# Patient Record
Sex: MALE | Race: WHITE | NOT HISPANIC OR LATINO | Employment: OTHER | URBAN - METROPOLITAN AREA
[De-identification: names, ages, dates, MRNs, and addresses within clinical notes are randomized per-mention and may not be internally consistent; named-entity substitution may affect disease eponyms.]

---

## 2017-01-03 ENCOUNTER — APPOINTMENT (OUTPATIENT)
Dept: RADIOLOGY | Facility: MEDICAL CENTER | Age: 72
End: 2017-01-03
Attending: PSYCHIATRY & NEUROLOGY
Payer: MEDICARE

## 2017-01-03 DIAGNOSIS — I73.9 VASCULAR CLAUDICATION (HCC): ICD-10-CM

## 2017-01-03 PROCEDURE — 74175 CTA ABDOMEN W/CONTRAST: CPT

## 2017-01-03 PROCEDURE — 700117 HCHG RX CONTRAST REV CODE 255: Performed by: PSYCHIATRY & NEUROLOGY

## 2017-01-03 RX ADMIN — IOHEXOL 100 ML: 350 INJECTION, SOLUTION INTRAVENOUS at 09:38

## 2017-01-04 ENCOUNTER — TELEPHONE (OUTPATIENT)
Dept: NEUROLOGY | Facility: MEDICAL CENTER | Age: 72
End: 2017-01-04

## 2017-01-04 NOTE — TELEPHONE ENCOUNTER
Please tell the patient     No significant stenosis or occlusions identified in the arteries to the legs. YP    Called and LM for pt to return my call to discuss all given information. EDDA

## 2017-01-12 ENCOUNTER — TELEPHONE (OUTPATIENT)
Dept: INTERNAL MEDICINE | Facility: IMAGING CENTER | Age: 72
End: 2017-01-12

## 2017-01-12 DIAGNOSIS — Z95.5 STENTED CORONARY ARTERY: Chronic | ICD-10-CM

## 2017-01-12 DIAGNOSIS — Z78.9 STATIN INTOLERANCE: ICD-10-CM

## 2017-01-12 DIAGNOSIS — E78.2 MIXED HYPERLIPIDEMIA: ICD-10-CM

## 2017-01-12 DIAGNOSIS — I25.83 CORONARY ARTERIOSCLEROSIS DUE TO LIPID RICH PLAQUE: Chronic | ICD-10-CM

## 2017-01-12 DIAGNOSIS — I25.10 CORONARY ARTERIOSCLEROSIS DUE TO LIPID RICH PLAQUE: Chronic | ICD-10-CM

## 2017-01-12 NOTE — TELEPHONE ENCOUNTER
Reviewed results of his recent CT aortogram.  Most significant is his atherosclerosis. Previously was on statins, discontinued this because of an elevated CPK and some leg pains. He really does not want to go back on a statin.  Exam showed some celiac artery stenosis. He has no GI symptoms. We discussed what to watch for as far as abdominal pain, diarrhea, blood in his stools.    I  recommend he contact Dr. Olvera, cardiology to discuss his vascular disease and lipid management.

## 2017-01-31 ENCOUNTER — OFFICE VISIT (OUTPATIENT)
Dept: CARDIOLOGY | Facility: MEDICAL CENTER | Age: 72
End: 2017-01-31
Payer: MEDICARE

## 2017-01-31 VITALS
SYSTOLIC BLOOD PRESSURE: 144 MMHG | DIASTOLIC BLOOD PRESSURE: 78 MMHG | WEIGHT: 272 LBS | HEIGHT: 71 IN | HEART RATE: 84 BPM | BODY MASS INDEX: 38.08 KG/M2

## 2017-01-31 DIAGNOSIS — I25.83 CORONARY ARTERIOSCLEROSIS DUE TO LIPID RICH PLAQUE: Chronic | ICD-10-CM

## 2017-01-31 DIAGNOSIS — I10 ESSENTIAL HYPERTENSION: ICD-10-CM

## 2017-01-31 DIAGNOSIS — I25.10 CORONARY ARTERIOSCLEROSIS DUE TO LIPID RICH PLAQUE: Chronic | ICD-10-CM

## 2017-01-31 PROCEDURE — G8432 DEP SCR NOT DOC, RNG: HCPCS | Performed by: INTERNAL MEDICINE

## 2017-01-31 PROCEDURE — G8482 FLU IMMUNIZE ORDER/ADMIN: HCPCS | Performed by: INTERNAL MEDICINE

## 2017-01-31 PROCEDURE — 1101F PT FALLS ASSESS-DOCD LE1/YR: CPT | Mod: 8P | Performed by: INTERNAL MEDICINE

## 2017-01-31 PROCEDURE — G8419 CALC BMI OUT NRM PARAM NOF/U: HCPCS | Performed by: INTERNAL MEDICINE

## 2017-01-31 PROCEDURE — 3017F COLORECTAL CA SCREEN DOC REV: CPT | Performed by: INTERNAL MEDICINE

## 2017-01-31 PROCEDURE — 99214 OFFICE O/P EST MOD 30 MIN: CPT | Performed by: INTERNAL MEDICINE

## 2017-01-31 PROCEDURE — 4040F PNEUMOC VAC/ADMIN/RCVD: CPT | Performed by: INTERNAL MEDICINE

## 2017-01-31 PROCEDURE — 1036F TOBACCO NON-USER: CPT | Performed by: INTERNAL MEDICINE

## 2017-01-31 NOTE — MR AVS SNAPSHOT
"        Myke Steele   2017 1:45 PM   Office Visit   MRN: [id number]    Department:  [facility]   Dept Phone:  [phone]    Description:  Male : 1945   Provider:  Raj Olvera M.D.           Reason for Visit     Follow-Up           Allergies as of 2017     No Known Allergies      You were diagnosed with     Coronary arteriosclerosis due to lipid rich plaque   [025082]       Essential hypertension   [4076070]         Vital Signs     Blood Pressure Pulse Height Weight Body Mass Index Smoking Status    144/78 mmHg 84 1.803 m (5' 10.98\") 123.378 kg (272 lb) 37.95 kg/m2 Never Smoker       Basic Information     Date Of Birth Sex Race Ethnicity Preferred Language    1945 Male White Non- English      Problem List              ICD-10-CM Priority Class Noted - Resolved    Stented coronary artery (Chronic) Z95.5   2012 - Present    BPH (benign prostatic hypertrophy) with urinary obstruction (Chronic) N40.1, N13.8   2012 - Present    Hypercholesterolemia (Chronic) E78.00   2012 - Present    Coronary arteriosclerosis due to lipid rich plaque (Chronic) I25.10, I25.83   Unknown - Present    Obesity (Chronic) E66.9   2014 - Present    Leukocytosis (Chronic) D72.829   2014 - Present    DDD (degenerative disc disease), lumbar (Chronic) M51.36   2014 - Present    Spinal stenosis of lumbar region M48.06   2014 - Present    Lumbosacral spondylosis without myelopathy M47.817   2015 - Present    Constipation K59.00   2015 - Present    Thyroid activity decreased (Chronic) E03.9   2015 - Present    Foot drop, bilateral M21.371, M21.372   3/18/2016 - Present    Essential hypertension I10   3/19/2016 - Present    Chest pain R07.9   3/19/2016 - Present    Vascular claudication (CMS-HCC) I73.9   2016 - Present    Neuropathy (CMS-HCC) G62.9   2016 - Present      Health Maintenance        Date Due Completion Dates    IMM ZOSTER " VACCINE 11/7/2007 ---    IMM PNEUMOCOCCAL 65+ (ADULT) LOW/MEDIUM RISK SERIES (2 of 2 - PPSV23) 9/23/2016 9/23/2015    IMM DTaP/Tdap/Td Vaccine (2 - Td) 8/27/2024 8/27/2014    COLONOSCOPY 11/18/2024 11/18/2014            Current Immunizations     13-VALENT PCV PREVNAR 9/23/2015    Influenza Vaccine Adult HD 10/7/2014    Influenza Vaccine Quad Inj (Pf) 11/21/2016    Pneumococcal Vaccine (PCV7) Historical Data 10/1/2014    Tdap Vaccine 8/27/2014  4:30 PM      Below and/or attached are the medications your provider expects you to take. Review all of your home medications and newly ordered medications with your provider and/or pharmacist. Follow medication instructions as directed by your provider and/or pharmacist. Please keep your medication list with you and share with your provider. Update the information when medications are discontinued, doses are changed, or new medications (including over-the-counter products) are added; and carry medication information at all times in the event of emergency situations     Allergies:  No Known Allergies          Medications  Valid as of: January 31, 2017 -  2:55 PM    Generic Name Brand Name Tablet Size Instructions for use    Alpha-Lipoic Acid (Cap) Alpha-Lipoic Acid 600 MG Take 600 mg by mouth every day.        AmLODIPine Besylate (Tab) NORVASC 5 MG Take 1 Tab by mouth every day.        Aspirin (Chew Tab) ASA 81 MG Take 243 mg by mouth every day.        Carvedilol (Tab) COREG 12.5 MG Take 1 Tab by mouth 2 times a day, with meals.        Cephalexin (Cap) KEFLEX 500 MG Take 1 Cap by mouth 3 times a day.        Coenzyme Q10 (Cap) Co Q-10 100 MG Take 200 mg by mouth every day.        Cyanocobalamin (SL Tab) Vitamin B-12 5000 MCG Place  under tongue.        Docusate Sodium (Cap)  MG Take 100 mg by mouth 2 Times a Day.        LevOCARNitine (Powder) Carnitine (L)  3 g by Does not apply route every day.        Levothyroxine Sodium (Tab) SYNTHROID 75 MCG TAKE 1 TAB BY MOUTH EVERY  DAY.        Levothyroxine Sodium (Tab) SYNTHROID 75 MCG TAKE 1 TAB BY MOUTH EVERY DAY.        Levothyroxine Sodium (Tab) SYNTHROID 75 MCG TAKE 1 TAB BY MOUTH EVERY DAY.        Lisinopril (Tab) PRINIVIL, ZESTRIL 40 MG Take 1 Tab by mouth every day.        Magnesium (Tab) Magnesium 250 MG Take 250 mg by mouth every day.        Non Formulary Request Non Formulary Request  Alpha linolenic acid 550mg        Tamsulosin HCl (Cap) FLOMAX 0.4 MG Take 1 Cap by mouth every day. TAKE 1 CAP BY MOUTH EVERY DAY.        .                 Medicines prescribed today were sent to:     [id number] - [place] - [address]    [address] [place] [address]    Phone: [phone] Fax: [phone]    Open 24 Hours?: No      Medication refill instructions:       If your prescription bottle indicates you have medication refills left, it is not necessary to call your provider’s office. Please contact your pharmacy and they will refill your medication.    If your prescription bottle indicates you do not have any refills left, you may request refills at any time through one of the following ways: The online [employer] system (except Urgent Care), by calling your provider’s office, or by asking your pharmacy to contact your provider’s office with a refill request. Medication refills are processed only during regular business hours and may not be available until the next business day. Your provider may request additional information or to have a follow-up visit with you prior to refilling your medication.   *Please Note: Medication refills are assigned a new Rx number when refilled electronically. Your pharmacy may indicate that no refills were authorized even though a new prescription for the same medication is available at the pharmacy. Please request the medicine by name with the pharmacy before contacting your provider for a refill.        Instructions    Ask Dr Leavitt about Elevali for lower rib pain is it safe for your neuropathy          Other Notes About Your Plan     Colonoscopy 11/18/14           MyChart Access Code: Activation code not generated  Current MyChart Status: Active

## 2017-01-31 NOTE — Clinical Note
"     [facility]   [address], [address]  [place] [address]  Phone: [phone]  Fax: [phone]              Myke Steele  1945    Encounter Date: 1/31/2017    Raj Olvera M.D.          PROGRESS NOTE:  Subjective:   Myke Steele is a 71 y.o. male who presents today for follow-up of his history of coronary disease status post stenting, negative PET stress test last year    He continues to struggle with lower extremity weakness he had a CTA which demonstrated mild amount of plaquing in the aorta but no significant stenosis except for possible celiac artery, he denies any celiac artery symptoms    He remains off statin therapy without significant improvement in his weakness he had EMG and muscle biopsy which were concerning for axonal nerve deficit    Past Medical History   Diagnosis Date   • Stented coronary artery 8/9/2012   • Coronary atherosclerosis of native coronary artery 8/9/2012   • Benign localized hyperplasia of prostate with urinary obstruction and other lower urinary tract symptoms (LUTS)(600.21) 8/9/2012   • Essential hypertriglyceridemia 8/9/2012   • Hypercholesterolemia 8/9/2012   • Obesity 8/28/2014   • Lumbar spinal stenosis    • Hypertension    • High cholesterol    • Unspecified disorder of thyroid    • Anesthesia 04/07/15     Has never had general anesthesia   • Pain 04/07/15     Bilateral lower back, radiating down buttocks=\"aches\" worse with activity   • Unspecified cataract      R IOL   • Benign prostatic hypertrophy    • Coronary arteriosclerosis due to lipid rich plaque      Past Surgical History   Procedure Laterality Date   • Stapedectomy       bilateral ears; only worked on the R ear   • Other cardiac surgery  2008     2 Cardiac stents   • Lumbar laminectomy diskectomy  4/29/2015     Performed by Juan Pablo Betts M.D. at SURGERY [facility]     Family History   Problem Relation Age of Onset   • Heart " Failure Father    • Heart Disease Father      valvular   • Hyperlipidemia Brother      History   Smoking status   • Never Smoker    Smokeless tobacco   • Never Used     No Known Allergies  Outpatient Encounter Prescriptions as of 1/31/2017   Medication Sig Dispense Refill   • levothyroxine (SYNTHROID) 75 MCG Tab TAKE 1 TAB BY MOUTH EVERY DAY. 90 Tab 3   • Non Formulary Request Alpha linolenic acid 550mg     • Coenzyme Q10 (CO Q-10) 100 MG Cap Take 200 mg by mouth every day.     • Alpha-Lipoic Acid 600 MG Cap Take 600 mg by mouth every day.     • Magnesium 250 MG Tab Take 250 mg by mouth every day.     • LevOCARNitine (CARNITINE, L,) Powder 3 g by Does not apply route every day.     • amlodipine (NORVASC) 5 MG Tab Take 1 Tab by mouth every day. 90 Tab 3   • carvedilol (COREG) 12.5 MG Tab Take 1 Tab by mouth 2 times a day, with meals. 180 Tab 3   • lisinopril (PRINIVIL, ZESTRIL) 40 MG tablet Take 1 Tab by mouth every day. 90 Tab 3   • aspirin (ASA) 81 MG Chew Tab chewable tablet Take 243 mg by mouth every day. 100 Tab 11   • Cyanocobalamin (VITAMIN B-12) 5000 MCG SL Tab Place  under tongue.     • tamsulosin (FLOMAX) 0.4 MG capsule Take 1 Cap by mouth every day. TAKE 1 CAP BY MOUTH EVERY DAY. 90 Cap 3   • levothyroxine (SYNTHROID) 75 MCG Tab TAKE 1 TAB BY MOUTH EVERY DAY. 90 Tab 3   • levothyroxine (SYNTHROID) 75 MCG Tab TAKE 1 TAB BY MOUTH EVERY DAY. 90 Tab 3   • cephALEXin (KEFLEX) 500 MG Cap Take 1 Cap by mouth 3 times a day. 30 Cap 0   • Docusate Sodium (DSS) 100 MG Cap Take 100 mg by mouth 2 Times a Day. 180 Cap 1     No facility-administered encounter medications on file as of 1/31/2017.     Review of Systems   Constitutional: Negative for fever and chills.   HENT: Negative for sore throat.    Eyes: Negative for blurred vision.   Respiratory: Negative for cough and shortness of breath.    Cardiovascular: Positive for chest pain (left lower rib pain). Negative for palpitations, claudication, leg swelling and PND.   "  Gastrointestinal: Negative for nausea and abdominal pain.   Musculoskeletal: Negative for falls.   Skin: Negative for rash.   Neurological: Positive for focal weakness and weakness. Negative for dizziness, loss of consciousness and headaches.   Endo/Heme/Allergies: Does not bruise/bleed easily.        Objective:   /78 mmHg  Pulse 84  Ht 1.803 m (5' 10.98\")  Wt 123.378 kg (272 lb)  BMI 37.95 kg/m2    Physical Exam   Constitutional: No distress.   HENT:   Mouth/Throat: Oropharynx is clear and moist.   Eyes: No scleral icterus.   Neck: Neck supple. No JVD present.   Cardiovascular: Normal rate, regular rhythm, normal heart sounds and intact distal pulses.  Exam reveals no gallop and no friction rub.    No murmur heard.  Pulmonary/Chest: Effort normal. He has no rales.   Abdominal: Soft. Bowel sounds are normal. There is no tenderness.   Musculoskeletal: He exhibits no edema.   Neurological: He is alert.   Skin: No rash noted. He is not diaphoretic.   Psychiatric: He has a normal mood and affect.     The images of the CTA which shows possible stenosis of the celiac artery at its origin, otherwise only mild plaquing no significant stenosis in the imaged portions of the carotids or peripheral arteries    Off of statin therapy his triglycerides and LDL are obviously increased    Assessment:     1. Coronary arteriosclerosis due to lipid rich plaque     2. Essential hypertension         Medical Decision Making:  Today's Assessment / Status / Plan:     It was my pleasure to meet with Mr. Steele.    For his lower extremity weakness, this does not seem related to statin myopathy although his CPKs chronically elevated mildly, we discussed the risks and benefits and he will stay off statin therapy we did discuss the new PSK9 inhibitors and due to the lack of evidence for other efficacy and endpoints, we elected not to start those it's likely that the trials this year will not show remarkable reduction in endpoints " but will wait and see.    He continues follow-up with neurology for his neuromuscular disorder    He is on appropriate aspirin    Blood pressure is well controlled    He does describe lower rib pain, commonly treated by Elavil (amitriptyline) given has neuropathy asked him to follow-up with neurology on its use and safety    I will see Mr. Steele back in 6 months time and encouraged him to follow up with us over the phone or e-mail using my MyChart as issues arise.    It is my pleasure to participate in the care of Mr. Steele.  Please do not hesitate to contact me with questions or concerns.    Raj Olvera MD PhD FAC  Cardiologist [facility]        Shelly George M.D.  [address]  [address]  [facility] [address]  VIA In Basket

## 2017-02-01 ASSESSMENT — ENCOUNTER SYMPTOMS
BRUISES/BLEEDS EASILY: 0
DIZZINESS: 0
CHILLS: 0
WEAKNESS: 1
FOCAL WEAKNESS: 1
LOSS OF CONSCIOUSNESS: 0
CLAUDICATION: 0
HEADACHES: 0
FALLS: 0
SHORTNESS OF BREATH: 0
NAUSEA: 0
PALPITATIONS: 0
ABDOMINAL PAIN: 0
BLURRED VISION: 0
COUGH: 0
FEVER: 0
SORE THROAT: 0
PND: 0

## 2017-02-02 NOTE — PROGRESS NOTES
"Subjective:   Myke Steele is a 71 y.o. male who presents today for follow-up of his history of coronary disease status post stenting, negative PET stress test last year    He continues to struggle with lower extremity weakness he had a CTA which demonstrated mild amount of plaquing in the aorta but no significant stenosis except for possible celiac artery, he denies any celiac artery symptoms    He remains off statin therapy without significant improvement in his weakness he had EMG and muscle biopsy which were concerning for axonal nerve deficit    Past Medical History   Diagnosis Date   • Stented coronary artery 8/9/2012   • Coronary atherosclerosis of native coronary artery 8/9/2012   • Benign localized hyperplasia of prostate with urinary obstruction and other lower urinary tract symptoms (LUTS)(600.21) 8/9/2012   • Essential hypertriglyceridemia 8/9/2012   • Hypercholesterolemia 8/9/2012   • Obesity 8/28/2014   • Lumbar spinal stenosis    • Hypertension    • High cholesterol    • Unspecified disorder of thyroid    • Anesthesia 04/07/15     Has never had general anesthesia   • Pain 04/07/15     Bilateral lower back, radiating down buttocks=\"aches\" worse with activity   • Unspecified cataract      R IOL   • Benign prostatic hypertrophy    • Coronary arteriosclerosis due to lipid rich plaque      Past Surgical History   Procedure Laterality Date   • Stapedectomy       bilateral ears; only worked on the R ear   • Other cardiac surgery  2008     2 Cardiac stents   • Lumbar laminectomy diskectomy  4/29/2015     Performed by Juan Pablo Betts M.D. at SURGERY [facility]     Family History   Problem Relation Age of Onset   • Heart Failure Father    • Heart Disease Father      valvular   • Hyperlipidemia Brother      History   Smoking status   • Never Smoker    Smokeless tobacco   • Never Used     No Known Allergies  Outpatient Encounter Prescriptions as of 1/31/2017   Medication Sig Dispense Refill   • " levothyroxine (SYNTHROID) 75 MCG Tab TAKE 1 TAB BY MOUTH EVERY DAY. 90 Tab 3   • Non Formulary Request Alpha linolenic acid 550mg     • Coenzyme Q10 (CO Q-10) 100 MG Cap Take 200 mg by mouth every day.     • Alpha-Lipoic Acid 600 MG Cap Take 600 mg by mouth every day.     • Magnesium 250 MG Tab Take 250 mg by mouth every day.     • LevOCARNitine (CARNITINE, L,) Powder 3 g by Does not apply route every day.     • amlodipine (NORVASC) 5 MG Tab Take 1 Tab by mouth every day. 90 Tab 3   • carvedilol (COREG) 12.5 MG Tab Take 1 Tab by mouth 2 times a day, with meals. 180 Tab 3   • lisinopril (PRINIVIL, ZESTRIL) 40 MG tablet Take 1 Tab by mouth every day. 90 Tab 3   • aspirin (ASA) 81 MG Chew Tab chewable tablet Take 243 mg by mouth every day. 100 Tab 11   • Cyanocobalamin (VITAMIN B-12) 5000 MCG SL Tab Place  under tongue.     • tamsulosin (FLOMAX) 0.4 MG capsule Take 1 Cap by mouth every day. TAKE 1 CAP BY MOUTH EVERY DAY. 90 Cap 3   • levothyroxine (SYNTHROID) 75 MCG Tab TAKE 1 TAB BY MOUTH EVERY DAY. 90 Tab 3   • levothyroxine (SYNTHROID) 75 MCG Tab TAKE 1 TAB BY MOUTH EVERY DAY. 90 Tab 3   • cephALEXin (KEFLEX) 500 MG Cap Take 1 Cap by mouth 3 times a day. 30 Cap 0   • Docusate Sodium (DSS) 100 MG Cap Take 100 mg by mouth 2 Times a Day. 180 Cap 1     No facility-administered encounter medications on file as of 1/31/2017.     Review of Systems   Constitutional: Negative for fever and chills.   HENT: Negative for sore throat.    Eyes: Negative for blurred vision.   Respiratory: Negative for cough and shortness of breath.    Cardiovascular: Positive for chest pain (left lower rib pain). Negative for palpitations, claudication, leg swelling and PND.   Gastrointestinal: Negative for nausea and abdominal pain.   Musculoskeletal: Negative for falls.   Skin: Negative for rash.   Neurological: Positive for focal weakness and weakness. Negative for dizziness, loss of consciousness and headaches.   Endo/Heme/Allergies: Does not  "bruise/bleed easily.        Objective:   /78 mmHg  Pulse 84  Ht 1.803 m (5' 10.98\")  Wt 123.378 kg (272 lb)  BMI 37.95 kg/m2    Physical Exam   Constitutional: No distress.   HENT:   Mouth/Throat: Oropharynx is clear and moist.   Eyes: No scleral icterus.   Neck: Neck supple. No JVD present.   Cardiovascular: Normal rate, regular rhythm, normal heart sounds and intact distal pulses.  Exam reveals no gallop and no friction rub.    No murmur heard.  Pulmonary/Chest: Effort normal. He has no rales.   Abdominal: Soft. Bowel sounds are normal. There is no tenderness.   Musculoskeletal: He exhibits no edema.   Neurological: He is alert.   Skin: No rash noted. He is not diaphoretic.   Psychiatric: He has a normal mood and affect.     The images of the CTA which shows possible stenosis of the celiac artery at its origin, otherwise only mild plaquing no significant stenosis in the imaged portions of the carotids or peripheral arteries    Off of statin therapy his triglycerides and LDL are obviously increased    Assessment:     1. Coronary arteriosclerosis due to lipid rich plaque     2. Essential hypertension         Medical Decision Making:  Today's Assessment / Status / Plan:     It was my pleasure to meet with Mr. Steele.    For his lower extremity weakness, this does not seem related to statin myopathy although his CPKs chronically elevated mildly, we discussed the risks and benefits and he will stay off statin therapy we did discuss the new PSK9 inhibitors and due to the lack of evidence for other efficacy and endpoints, we elected not to start those it's likely that the trials this year will not show remarkable reduction in endpoints but will wait and see.    He continues follow-up with neurology for his neuromuscular disorder    He is on appropriate aspirin    Blood pressure is well controlled    He does describe lower rib pain, commonly treated by Elavil (amitriptyline) given has neuropathy asked him to " follow-up with neurology on its use and safety    I will see Mr. Steele back in 6 months time and encouraged him to follow up with us over the phone or e-mail using my MyChart as issues arise.    It is my pleasure to participate in the care of Mr. Steele.  Please do not hesitate to contact me with questions or concerns.    Raj Olvera MD PhD FAC  Cardiologist [facility]

## 2017-02-27 RX ORDER — LEVOTHYROXINE SODIUM 0.07 MG/1
TABLET ORAL
Qty: 90 TAB | Refills: 3 | Status: SHIPPED | OUTPATIENT
Start: 2017-02-27 | End: 2018-05-21 | Stop reason: SDUPTHER

## 2017-04-04 ENCOUNTER — PATIENT MESSAGE (OUTPATIENT)
Dept: INTERNAL MEDICINE | Facility: IMAGING CENTER | Age: 72
End: 2017-04-04

## 2017-04-04 DIAGNOSIS — M65.341 TRIGGER RING FINGER OF RIGHT HAND: ICD-10-CM

## 2017-04-04 DIAGNOSIS — M48.061 SPINAL STENOSIS OF LUMBAR REGION: ICD-10-CM

## 2017-04-04 DIAGNOSIS — M51.36 DDD (DEGENERATIVE DISC DISEASE), LUMBAR: ICD-10-CM

## 2017-04-04 NOTE — TELEPHONE ENCOUNTER
From: Myke Nair  To: Shelly George M.D.  Sent: 4/4/2017 2:44 PM PDT  Subject: Non-Urgent Medical Question    Is where Dr Glen Tripp, the son works out of?  He's the name yomi Rubin have to me.    Also could you write a script for physical therapy   to work on my lower back?  I go to [facility]   on [address]  Phone. [phone]  Fax. [phone]    Brian Newton  ----- Message -----  From: Shelly George M.D.  Sent: 4/4/2017 1:02 PM PDT  To: Myke Nair  Subject: RE: Non-Urgent Medical Question    Catia Stringer,  I will do a referral to [facility]. It may take a couple weeks.   Another option is [facility]. It is located at [facility], but it is a walk-in urgent care. The wait is usually not too bad if you want to try that.  I hope you are otherwise doing well.    ----- Message -----   From: MYKE NAIR   Sent: 4/4/2017 10:30 AM PDT   To: Shelly George M.D.  Subject: Non-Urgent Medical Question    Hi Dr George,    I am still experiencing trigger finger on my right  hand ring finger.  Tried massaging it, taking anti inflammatory pills,   taping to stop from bending but nothing helped.  Can you get me in to see somebody to give me a  shot?    Myke Torres

## 2017-04-04 NOTE — TELEPHONE ENCOUNTER
From: Myke Steele  To: Shelly George M.D.  Sent: 4/4/2017 10:30 AM PDT  Subject: Non-Urgent Medical Question    Hi Dr George,    I am still experiencing trigger finger on my right  hand ring finger.  Tried massaging it, taking anti inflammatory pills,   taping to stop from bending but nothing helped.  Can you get me in to see somebody to give me a  shot?    Thanks,  Myke

## 2017-04-13 ENCOUNTER — PATIENT MESSAGE (OUTPATIENT)
Dept: INTERNAL MEDICINE | Facility: IMAGING CENTER | Age: 72
End: 2017-04-13

## 2017-04-13 DIAGNOSIS — I10 ESSENTIAL HYPERTENSION: ICD-10-CM

## 2017-04-13 RX ORDER — LISINOPRIL 40 MG/1
40 TABLET ORAL DAILY
Qty: 90 TAB | Refills: 3 | Status: SHIPPED | OUTPATIENT
Start: 2017-04-13 | End: 2018-03-18 | Stop reason: SDUPTHER

## 2017-04-13 NOTE — TELEPHONE ENCOUNTER
From: Myke Steele  To: Shelly George M.D.  Sent: 4/13/2017 9:33 AM PDT  Subject: Prescription Question    Hi Dr George   Can you call in new prescription because all refills used up for    lisinopril 40 MG tablet Commonly known as: DEEPALI BENJAMIN  About This Medication  Instructions: Take 1 tab by mouth every day.  Approved by Click to display more information about this provider. New window will open.Shelly George M.D.  on 4/19/2016  Prescribed quantity: 90 Tabs  3 refills remaining    Thanks  Myke

## 2017-04-24 ENCOUNTER — OFFICE VISIT (OUTPATIENT)
Dept: INTERNAL MEDICINE | Facility: IMAGING CENTER | Age: 72
End: 2017-04-24
Payer: MEDICARE

## 2017-04-24 VITALS
RESPIRATION RATE: 14 BRPM | DIASTOLIC BLOOD PRESSURE: 75 MMHG | OXYGEN SATURATION: 96 % | TEMPERATURE: 97.5 F | HEIGHT: 71 IN | HEART RATE: 80 BPM | WEIGHT: 272 LBS | BODY MASS INDEX: 38.08 KG/M2 | SYSTOLIC BLOOD PRESSURE: 138 MMHG

## 2017-04-24 DIAGNOSIS — M25.672 ANKLE JOINT STIFFNESS, BILATERAL: ICD-10-CM

## 2017-04-24 DIAGNOSIS — M25.671 ANKLE JOINT STIFFNESS, BILATERAL: ICD-10-CM

## 2017-04-24 DIAGNOSIS — M79.605 LEFT LEG PAIN: ICD-10-CM

## 2017-04-24 PROCEDURE — G8432 DEP SCR NOT DOC, RNG: HCPCS | Performed by: FAMILY MEDICINE

## 2017-04-24 PROCEDURE — 1101F PT FALLS ASSESS-DOCD LE1/YR: CPT | Mod: 8P | Performed by: FAMILY MEDICINE

## 2017-04-24 PROCEDURE — 1036F TOBACCO NON-USER: CPT | Performed by: FAMILY MEDICINE

## 2017-04-24 PROCEDURE — 99213 OFFICE O/P EST LOW 20 MIN: CPT | Performed by: FAMILY MEDICINE

## 2017-04-24 PROCEDURE — G8419 CALC BMI OUT NRM PARAM NOF/U: HCPCS | Performed by: FAMILY MEDICINE

## 2017-04-24 PROCEDURE — 4040F PNEUMOC VAC/ADMIN/RCVD: CPT | Performed by: FAMILY MEDICINE

## 2017-04-24 NOTE — MR AVS SNAPSHOT
"        Myke Dominguez Cedric   2017 3:30 PM   Office Visit   MRN: [id number]    Department:  [facility]   Dept Phone:  [phone]    Description:  Male : 1945   Provider:  Shelly George M.D.           Allergies as of 2017     No Known Allergies      Vital Signs     Blood Pressure Pulse Temperature Respirations Height Weight    138/75 mmHg 80 36.4 °C (97.5 °F) 14 1.803 m (5' 10.98\") 123.378 kg (272 lb)    Body Mass Index Oxygen Saturation Smoking Status             37.95 kg/m2 96% Never Smoker          Basic Information     Date Of Birth Sex Race Ethnicity Preferred Language    1945 Male White Non- English      Problem List              ICD-10-CM Priority Class Noted - Resolved    Stented coronary artery (Chronic) Z95.5   2012 - Present    BPH (benign prostatic hypertrophy) with urinary obstruction (Chronic) N40.1, N13.8   2012 - Present    Hypercholesterolemia (Chronic) E78.00   2012 - Present    Coronary arteriosclerosis due to lipid rich plaque (Chronic) I25.10, I25.83   Unknown - Present    Obesity (Chronic) E66.9   2014 - Present    Leukocytosis (Chronic) D72.829   2014 - Present    DDD (degenerative disc disease), lumbar (Chronic) M51.36   2014 - Present    Spinal stenosis of lumbar region M48.06   2014 - Present    Lumbosacral spondylosis without myelopathy M47.817   2015 - Present    Constipation K59.00   2015 - Present    Thyroid activity decreased (Chronic) E03.9   2015 - Present    Foot drop, bilateral M21.371, M21.372   3/18/2016 - Present    Essential hypertension I10   3/19/2016 - Present    Chest pain R07.9   3/19/2016 - Present    Vascular claudication (CMS-HCC) I73.9   2016 - Present    Neuropathy (CMS-HCC) G62.9   2016 - Present      Health Maintenance        Date Due Completion Dates    IMM ZOSTER VACCINE 2007 ---    IMM PNEUMOCOCCAL 65+ (ADULT) LOW/MEDIUM RISK SERIES (2 of 2 - PPSV23) " 9/23/2016 9/23/2015    IMM DTaP/Tdap/Td Vaccine (2 - Td) 8/27/2024 8/27/2014    COLONOSCOPY 11/18/2024 11/18/2014            Current Immunizations     13-VALENT PCV PREVNAR 9/23/2015    Influenza Vaccine Adult HD 10/7/2014    Influenza Vaccine Quad Inj (Pf) 11/21/2016    Pneumococcal Vaccine (PCV7) Historical Data 10/1/2014    Tdap Vaccine 8/27/2014  4:30 PM      Below and/or attached are the medications your provider expects you to take. Review all of your home medications and newly ordered medications with your provider and/or pharmacist. Follow medication instructions as directed by your provider and/or pharmacist. Please keep your medication list with you and share with your provider. Update the information when medications are discontinued, doses are changed, or new medications (including over-the-counter products) are added; and carry medication information at all times in the event of emergency situations     Allergies:  No Known Allergies          Medications  Valid as of: April 24, 2017 -  4:15 PM    Generic Name Brand Name Tablet Size Instructions for use    Alpha-Lipoic Acid (Cap) Alpha-Lipoic Acid 600 MG Take 600 mg by mouth every day.        AmLODIPine Besylate (Tab) NORVASC 5 MG Take 1 Tab by mouth every day.        Aspirin (Chew Tab) ASA 81 MG Take 243 mg by mouth every day.        Carvedilol (Tab) COREG 12.5 MG Take 1 Tab by mouth 2 times a day, with meals.        Coenzyme Q10 (Cap) Co Q-10 100 MG Take 200 mg by mouth every day.        Cyanocobalamin (SL Tab) Vitamin B-12 5000 MCG Place  under tongue.        Docusate Sodium (Cap)  MG Take 100 mg by mouth 2 Times a Day.        LevOCARNitine (Powder) Carnitine (L)  3 g by Does not apply route every day.        Levothyroxine Sodium (Tab) SYNTHROID 75 MCG TAKE 1 TAB BY MOUTH EVERY DAY.        Lisinopril (Tab) PRINIVIL, ZESTRIL 40 MG Take 1 Tab by mouth every day.        Magnesium (Tab) Magnesium 250 MG Take 250 mg by mouth every day.        Non  Formulary Request Non Formulary Request  Alpha linolenic acid 550mg        Tamsulosin HCl (Cap) FLOMAX 0.4 MG Take 1 Cap by mouth every day. TAKE 1 CAP BY MOUTH EVERY DAY.        .                 Medicines prescribed today were sent to:     [facility][id number] - [place] - [address] [place] [address]    Phone: [phone] Fax: [phone]    Open 24 Hours?: No      Medication refill instructions:       If your prescription bottle indicates you have medication refills left, it is not necessary to call your provider’s office. Please contact your pharmacy and they will refill your medication.    If your prescription bottle indicates you do not have any refills left, you may request refills at any time through one of the following ways: The online [employer] system (except Urgent Care), by calling your provider’s office, or by asking your pharmacy to contact your provider’s office with a refill request. Medication refills are processed only during regular business hours and may not be available until the next business day. Your provider may request additional information or to have a follow-up visit with you prior to refilling your medication.   *Please Note: Medication refills are assigned a new Rx number when refilled electronically. Your pharmacy may indicate that no refills were authorized even though a new prescription for the same medication is available at the pharmacy. Please request the medicine by name with the pharmacy before contacting your provider for a refill.        Other Notes About Your Plan     Colonoscopy 11/18/14           [employer] Access Code: Activation code not generated  Current [employer] Status: Active

## 2017-04-26 NOTE — PROGRESS NOTES
Chief Complaint   Patient presents with   • Leg Pain     left lower leg pain x 1 week       HISTORY OF PRESENT ILLNESS: Patient is a 71 y.o. male established patient who presents today complaining of pain in his left lower leg for about a week. He remembers no injury. He complains of pain left lateral lower leg in one particular area. There is mild erythema. No warmth. It does seem to slowly be improving. He questions whether he may have banged it against something. He denies any calf pain.    He does have ongoing issues with decreased ankle mobility. He has stiffness in his ankles. He has been diagnosed with a nonspecific polyneuropathy. He had neurology evaluation and both a nerve and muscle biopsy which were nondiagnostic. He is going to be starting some physical therapy and wonders if they could also work on his ankles and lower legs.      Patient Active Problem List    Diagnosis Date Noted   • Vascular claudication (CMS-HCC) 11/30/2016   • Neuropathy (CMS-HCC) 11/30/2016   • Essential hypertension 03/19/2016   • Chest pain 03/19/2016   • Foot drop, bilateral 03/18/2016   • Thyroid activity decreased 09/23/2015   • Constipation 05/04/2015   • Lumbosacral spondylosis without myelopathy 04/29/2015   • DDD (degenerative disc disease), lumbar 12/31/2014   • Spinal stenosis of lumbar region 12/31/2014   • Leukocytosis 09/05/2014   • Obesity 08/28/2014   • Coronary arteriosclerosis due to lipid rich plaque    • Stented coronary artery 08/09/2012   • BPH (benign prostatic hypertrophy) with urinary obstruction 08/09/2012   • Hypercholesterolemia 08/09/2012     Current Outpatient Prescriptions on File Prior to Visit   Medication Sig Dispense Refill   • lisinopril (PRINIVIL, ZESTRIL) 40 MG tablet Take 1 Tab by mouth every day. 90 Tab 3   • levothyroxine (SYNTHROID) 75 MCG Tab TAKE 1 TAB BY MOUTH EVERY DAY. 90 Tab 3   • Docusate Sodium (DSS) 100 MG Cap Take 100 mg by mouth 2 Times a Day. 180 Cap 1   • Non Formulary Request  "Alpha linolenic acid 550mg     • Coenzyme Q10 (CO Q-10) 100 MG Cap Take 200 mg by mouth every day.     • Alpha-Lipoic Acid 600 MG Cap Take 600 mg by mouth every day.     • Magnesium 250 MG Tab Take 250 mg by mouth every day.     • LevOCARNitine (CARNITINE, L,) Powder 3 g by Does not apply route every day.     • amlodipine (NORVASC) 5 MG Tab Take 1 Tab by mouth every day. 90 Tab 3   • carvedilol (COREG) 12.5 MG Tab Take 1 Tab by mouth 2 times a day, with meals. 180 Tab 3   • aspirin (ASA) 81 MG Chew Tab chewable tablet Take 243 mg by mouth every day. 100 Tab 11   • Cyanocobalamin (VITAMIN B-12) 5000 MCG SL Tab Place  under tongue.     • tamsulosin (FLOMAX) 0.4 MG capsule Take 1 Cap by mouth every day. TAKE 1 CAP BY MOUTH EVERY DAY. 90 Cap 3     No current facility-administered medications on file prior to visit.         Past medical, surgical, family, and social history is reviewed and updated in Epic chart by me today.   Medications and allergies reviewed and updated in Epic chart by me today.     REVIEW OF SYSTEMS:  GENERAL: No fatigue, no weight loss.  CV:  No chest pain,dyspnea,palpitations or edema.  RESP:  No sob,cough,wheezing or hemoptysis.  GI: No dysphagia, heartburn,abdominal pain, nausea, vomiting, diarrhea or constipation.       No melena, jaundice, bleeding, incontinence or change in bowel habits.  :  No dysuria, polyuria, hematuria, incontinence, or nocturia.  MS: As above.  NEURO:  No seizures, syncope, paralysis, tremor, or weakness.  SKIN: No new or concerning skin lesions or changes.   PSYCH: Mood fine.    Filed Vitals:    04/24/17 1500   BP: 138/75   Pulse: 80   Temp: 36.4 °C (97.5 °F)   Resp: 14   Height: 1.803 m (5' 10.98\")   Weight: 123.378 kg (272 lb)   SpO2: 96%     Physical Exam:  Gen: Overweight male.. No acute distress.  Neck:  Supple, no adenopathy or thyromegaly.  Heart:  Regular rate and rhythm.  Normal S1, S2. No murmur, gallop or rub.  Lungs:  Clear, No wheezes,rales or " rhonchi.  Extremities:  No edema. Negative Homans signs bilaterally. His left lower leg is slightly tender laterally. There is a 2 cm area of mild erythema. No bruising, no warmth.   Both ankles have decreased range of motion.   Psych: Mood and affect are appropriate.        Assessment/Plan:  1. Left leg pain . I suspect he does have a contusion although he remembers no injury. It is slowly improving. We'll have him continue to monitor and call if symptoms do not completely resolve over the next 1-2 weeks.     2. Ankle joint stiffness, bilateral . Will have physical therapy work on his ankles as well.

## 2017-05-09 RX ORDER — TAMSULOSIN HYDROCHLORIDE 0.4 MG/1
CAPSULE ORAL
Qty: 90 CAP | Refills: 3 | Status: SHIPPED | OUTPATIENT
Start: 2017-05-09 | End: 2017-06-30

## 2017-05-24 ENCOUNTER — PATIENT MESSAGE (OUTPATIENT)
Dept: INTERNAL MEDICINE | Facility: IMAGING CENTER | Age: 72
End: 2017-05-24

## 2017-05-24 DIAGNOSIS — I10 ESSENTIAL HYPERTENSION: ICD-10-CM

## 2017-05-25 RX ORDER — CARVEDILOL 12.5 MG/1
12.5 TABLET ORAL 2 TIMES DAILY WITH MEALS
Qty: 180 TAB | Refills: 3 | Status: SHIPPED | OUTPATIENT
Start: 2017-05-25 | End: 2018-06-08 | Stop reason: SDUPTHER

## 2017-05-25 NOTE — TELEPHONE ENCOUNTER
From: Myke Steele  To: Shelly George M.D.  Sent: 5/24/2017 8:13 PM PDT  Subject: Non-Urgent Medical Question    Hi Dr George,    Can you call in a refill for    carvedilol 12.5 MG Tabs Commonly known as: COREG  About This Medication  Instructions: Take 1 tab by mouth 2 times a day, with meals.  Approved by Click to display more information about this provider. New window will open.Shelly George M.D.  on 5/31/2016  Prescribed quantity: 180 Tabs    Thanks  Myke

## 2017-06-02 ENCOUNTER — PATIENT MESSAGE (OUTPATIENT)
Dept: INTERNAL MEDICINE | Facility: IMAGING CENTER | Age: 72
End: 2017-06-02

## 2017-06-02 DIAGNOSIS — R97.20 ELEVATED PSA: ICD-10-CM

## 2017-06-02 DIAGNOSIS — R73.09 ELEVATED GLUCOSE: ICD-10-CM

## 2017-06-09 ENCOUNTER — HOSPITAL ENCOUNTER (OUTPATIENT)
Dept: LAB | Facility: MEDICAL CENTER | Age: 72
End: 2017-06-09
Attending: FAMILY MEDICINE
Payer: MEDICARE

## 2017-06-09 DIAGNOSIS — R97.20 ELEVATED PSA: ICD-10-CM

## 2017-06-09 DIAGNOSIS — R73.09 ELEVATED GLUCOSE: ICD-10-CM

## 2017-06-09 LAB
EST. AVERAGE GLUCOSE BLD GHB EST-MCNC: 134 MG/DL
GLUCOSE SERPL-MCNC: 142 MG/DL (ref 65–99)
HBA1C MFR BLD: 6.3 % (ref 0–5.6)
PSA SERPL-MCNC: 5.69 NG/ML (ref 0–4)

## 2017-06-09 PROCEDURE — 84153 ASSAY OF PSA TOTAL: CPT

## 2017-06-09 PROCEDURE — 82947 ASSAY GLUCOSE BLOOD QUANT: CPT

## 2017-06-09 PROCEDURE — 36415 COLL VENOUS BLD VENIPUNCTURE: CPT

## 2017-06-09 PROCEDURE — 83036 HEMOGLOBIN GLYCOSYLATED A1C: CPT | Mod: GA

## 2017-07-21 ENCOUNTER — PATIENT MESSAGE (OUTPATIENT)
Dept: INTERNAL MEDICINE | Facility: IMAGING CENTER | Age: 72
End: 2017-07-21

## 2017-07-24 RX ORDER — TADALAFIL 5 MG/1
5 TABLET ORAL DAILY
Qty: 90 TAB | Refills: 1 | Status: SHIPPED
Start: 2017-07-24 | End: 2018-01-30 | Stop reason: SDUPTHER

## 2017-07-28 ENCOUNTER — PATIENT MESSAGE (OUTPATIENT)
Dept: INTERNAL MEDICINE | Facility: IMAGING CENTER | Age: 72
End: 2017-07-28

## 2017-07-28 DIAGNOSIS — I10 ESSENTIAL HYPERTENSION: ICD-10-CM

## 2017-07-28 RX ORDER — AMLODIPINE BESYLATE 5 MG/1
5 TABLET ORAL DAILY
Qty: 90 TAB | Refills: 3 | Status: SHIPPED | OUTPATIENT
Start: 2017-07-28 | End: 2018-10-16 | Stop reason: SDUPTHER

## 2017-07-28 NOTE — TELEPHONE ENCOUNTER
From: Myke Steele  To: Shelly George M.D.  Sent: 7/28/2017 8:47 AM PDT  Subject: Prescription Question    Hi Dr George   Can you send in a new prescription for  amlodipine 5 MG Tabs Commonly known as: NORVASC    There are no refills left  Thank you    Myke

## 2017-08-02 ENCOUNTER — PATIENT MESSAGE (OUTPATIENT)
Dept: INTERNAL MEDICINE | Facility: IMAGING CENTER | Age: 72
End: 2017-08-02

## 2017-08-03 RX ORDER — AMLODIPINE BESYLATE 5 MG/1
TABLET ORAL
Qty: 90 TAB | Refills: 3 | Status: SHIPPED | OUTPATIENT
Start: 2017-08-03 | End: 2018-08-31

## 2017-10-05 ENCOUNTER — PATIENT MESSAGE (OUTPATIENT)
Dept: INTERNAL MEDICINE | Facility: IMAGING CENTER | Age: 72
End: 2017-10-05

## 2017-10-05 DIAGNOSIS — L98.9 CHANGING SKIN LESION: ICD-10-CM

## 2017-10-05 NOTE — TELEPHONE ENCOUNTER
From: Myke Steele  To: hSelly George M.D.  Sent: 10/5/2017 11:00 AM PDT  Subject: Non-Urgent Medical Question    Hi Dr George   I have a growth on the back of my neck that is getting larger.  I had it removed a number of years ago 5+ by Dr Garcia at [facility].  It's time to address it again. I do it need to use Dr Garcia again.   Is there someone you recommend to do this?

## 2017-10-24 DIAGNOSIS — I25.10 CORONARY ARTERIOSCLEROSIS DUE TO LIPID RICH PLAQUE: Chronic | ICD-10-CM

## 2017-10-24 DIAGNOSIS — I25.83 CORONARY ARTERIOSCLEROSIS DUE TO LIPID RICH PLAQUE: Chronic | ICD-10-CM

## 2017-10-24 DIAGNOSIS — E78.00 HYPERCHOLESTEROLEMIA: Chronic | ICD-10-CM

## 2017-10-25 ENCOUNTER — TELEPHONE (OUTPATIENT)
Dept: CARDIOLOGY | Facility: MEDICAL CENTER | Age: 72
End: 2017-10-25

## 2017-10-25 NOTE — TELEPHONE ENCOUNTER
----- Message from Jocelyn Conte sent at 10/24/2017 10:23 AM PDT -----  Regarding: any testing prior to CW appt?  Contact: [phone]  DILLON/wendie uD calling to ask if there is any testing necessary in preparation for 11/15 appt with CW. Please call Myke on cell #[phone]

## 2017-10-27 RX ORDER — AZITHROMYCIN 250 MG/1
TABLET, FILM COATED ORAL
Qty: 6 TAB | Refills: 1 | Status: SHIPPED | OUTPATIENT
Start: 2017-10-27 | End: 2018-08-31

## 2017-11-10 ENCOUNTER — TELEPHONE (OUTPATIENT)
Dept: CARDIOLOGY | Facility: MEDICAL CENTER | Age: 72
End: 2017-11-10

## 2017-11-11 ENCOUNTER — HOSPITAL ENCOUNTER (OUTPATIENT)
Dept: LAB | Facility: MEDICAL CENTER | Age: 72
End: 2017-11-11
Attending: INTERNAL MEDICINE
Payer: MEDICARE

## 2017-11-11 DIAGNOSIS — I25.83 CORONARY ARTERIOSCLEROSIS DUE TO LIPID RICH PLAQUE: Chronic | ICD-10-CM

## 2017-11-11 DIAGNOSIS — E78.00 HYPERCHOLESTEROLEMIA: Chronic | ICD-10-CM

## 2017-11-11 DIAGNOSIS — I25.10 CORONARY ARTERIOSCLEROSIS DUE TO LIPID RICH PLAQUE: Chronic | ICD-10-CM

## 2017-11-11 LAB
ALBUMIN SERPL BCP-MCNC: 4.2 G/DL (ref 3.2–4.9)
ALBUMIN/GLOB SERPL: 1.6 G/DL
ALP SERPL-CCNC: 70 U/L (ref 30–99)
ALT SERPL-CCNC: 50 U/L (ref 2–50)
ANION GAP SERPL CALC-SCNC: 7 MMOL/L (ref 0–11.9)
AST SERPL-CCNC: 31 U/L (ref 12–45)
BILIRUB SERPL-MCNC: 0.8 MG/DL (ref 0.1–1.5)
BUN SERPL-MCNC: 18 MG/DL (ref 8–22)
CALCIUM SERPL-MCNC: 9.9 MG/DL (ref 8.5–10.5)
CHLORIDE SERPL-SCNC: 103 MMOL/L (ref 96–112)
CHOLEST SERPL-MCNC: 199 MG/DL (ref 100–199)
CO2 SERPL-SCNC: 29 MMOL/L (ref 20–33)
CREAT SERPL-MCNC: 0.82 MG/DL (ref 0.5–1.4)
GFR SERPL CREATININE-BSD FRML MDRD: >60 ML/MIN/1.73 M 2
GLOBULIN SER CALC-MCNC: 2.6 G/DL (ref 1.9–3.5)
GLUCOSE SERPL-MCNC: 125 MG/DL (ref 65–99)
HDLC SERPL-MCNC: 48 MG/DL
LDLC SERPL CALC-MCNC: 105 MG/DL
POTASSIUM SERPL-SCNC: 4.3 MMOL/L (ref 3.6–5.5)
PROT SERPL-MCNC: 6.8 G/DL (ref 6–8.2)
SODIUM SERPL-SCNC: 139 MMOL/L (ref 135–145)
TRIGL SERPL-MCNC: 229 MG/DL (ref 0–149)

## 2017-11-11 PROCEDURE — 80061 LIPID PANEL: CPT

## 2017-11-11 PROCEDURE — 36415 COLL VENOUS BLD VENIPUNCTURE: CPT

## 2017-11-11 PROCEDURE — 80053 COMPREHEN METABOLIC PANEL: CPT

## 2017-11-13 ENCOUNTER — HOSPITAL ENCOUNTER (OUTPATIENT)
Facility: MEDICAL CENTER | Age: 72
End: 2017-11-13
Attending: DERMATOLOGY
Payer: MEDICARE

## 2017-11-13 ENCOUNTER — OFFICE VISIT (OUTPATIENT)
Dept: DERMATOLOGY | Facility: IMAGING CENTER | Age: 72
End: 2017-11-13
Payer: MEDICARE

## 2017-11-13 VITALS — BODY MASS INDEX: 36.4 KG/M2 | WEIGHT: 260 LBS | HEIGHT: 71 IN | TEMPERATURE: 97.2 F

## 2017-11-13 DIAGNOSIS — D48.5 NEOPLASM OF UNCERTAIN BEHAVIOR OF SKIN: ICD-10-CM

## 2017-11-13 DIAGNOSIS — L72.0 EIC (EPIDERMAL INCLUSION CYST): ICD-10-CM

## 2017-11-13 PROCEDURE — 11100 PR BIOPSY OF SKIN LESION: CPT | Performed by: DERMATOLOGY

## 2017-11-13 PROCEDURE — 88305 TISSUE EXAM BY PATHOLOGIST: CPT

## 2017-11-13 PROCEDURE — 99203 OFFICE O/P NEW LOW 30 MIN: CPT | Mod: 25 | Performed by: DERMATOLOGY

## 2017-11-13 RX ORDER — FINASTERIDE 5 MG/1
5 TABLET, FILM COATED ORAL DAILY
COMMUNITY
End: 2020-10-19

## 2017-11-13 ASSESSMENT — ENCOUNTER SYMPTOMS
CHILLS: 0
FEVER: 0

## 2017-11-13 NOTE — PROGRESS NOTES
"Dermatology New Patient Visit    Chief Complaint   Patient presents with   • Cyst       Subjective:     HPI:   Myke Steele is a 72 y.o. male presenting for    Cyst on back of neck   Has been present for 8 years, had surgery in the past, but since recurred over the past year or so  Not red or inflamed, but irritating, at the base of the occipital scalp, feels pressure, would like removed  No aggravating/alleviating factors    Incidentally noted a spot on the left radial forearm  Patient believes present for > 1 year  Unsure of any growth  Occasionally has bled  History of skin cancer: No  History of biopsies:No  History of blistering/severe sunburns:No  Family history of skin cancer:No  Family history of atypical moles:No        Past Medical History:   Diagnosis Date   • Anesthesia 04/07/15    Has never had general anesthesia   • Benign localized hyperplasia of prostate with urinary obstruction and other lower urinary tract symptoms (LUTS) 8/9/2012   • Benign prostatic hypertrophy    • Coronary arteriosclerosis due to lipid rich plaque    • Coronary atherosclerosis of native coronary artery 8/9/2012   • Essential hypertriglyceridemia 8/9/2012   • High cholesterol    • Hypercholesterolemia 8/9/2012   • Hypertension    • Lumbar spinal stenosis    • Obesity 8/28/2014   • Pain 04/07/15    Bilateral lower back, radiating down buttocks=\"aches\" worse with activity   • Stented coronary artery 8/9/2012   • Unspecified cataract     R IOL   • Unspecified disorder of thyroid        Current Outpatient Prescriptions on File Prior to Visit   Medication Sig Dispense Refill   • azithromycin (ZITHROMAX) 250 MG Tab Take tablets by mouth as directed. 6 Tab 1   • amlodipine (NORVASC) 5 MG Tab TAKE 1 TAB BY MOUTH EVERY DAY. 90 Tab 3   • amlodipine (NORVASC) 5 MG Tab Take 1 Tab by mouth every day. 90 Tab 3   • tadalafil (CIALIS) 5 MG tablet Take 1 Tab by mouth every day. 90 Tab 1   • carvedilol (COREG) 12.5 MG Tab Take 1 Tab by " mouth 2 times a day, with meals. 180 Tab 3   • lisinopril (PRINIVIL, ZESTRIL) 40 MG tablet Take 1 Tab by mouth every day. 90 Tab 3   • levothyroxine (SYNTHROID) 75 MCG Tab TAKE 1 TAB BY MOUTH EVERY DAY. 90 Tab 3   • Docusate Sodium (DSS) 100 MG Cap Take 100 mg by mouth 2 Times a Day. 180 Cap 1   • Non Formulary Request Alpha linolenic acid 550mg     • Coenzyme Q10 (CO Q-10) 100 MG Cap Take 200 mg by mouth every day.     • Alpha-Lipoic Acid 600 MG Cap Take 600 mg by mouth every day.     • Magnesium 250 MG Tab Take 250 mg by mouth every day.     • LevOCARNitine (CARNITINE, L,) Powder 3 g by Does not apply route every day.     • aspirin (ASA) 81 MG Chew Tab chewable tablet Take 243 mg by mouth every day. 100 Tab 11   • Cyanocobalamin (VITAMIN B-12) 5000 MCG SL Tab Place  under tongue.       No current facility-administered medications on file prior to visit.        No Known Allergies    Family History   Problem Relation Age of Onset   • Heart Failure Father    • Heart Disease Father      valvular   • Hyperlipidemia Brother        Social History     Social History   • Marital status:      Spouse name: N/A   • Number of children: N/A   • Years of education: N/A     Occupational History   • Not on file.     Social History Main Topics   • Smoking status: Never Smoker   • Smokeless tobacco: Never Used   • Alcohol use Yes      Comment: 2-3/week   • Drug use: No   • Sexual activity: Not on file      Comment: , 2 daughters, retired      Other Topics Concern   • Not on file     Social History Narrative   • No narrative on file       Review of Systems   Constitutional: Negative for chills and fever.   Skin: Negative for itching and rash.   All other systems reviewed and are negative.       Objective:     A focused cutaneous exam was completed including: scalp, hair, ears, face, eyelids, conjunctiva, lips, neck, left and right upper extremities (including hands/digits and fingernails) with the following pertinent  "findings listed below. Remaining above-listed examined areas within normal limits / negative for rashes or lesions.    Temperature 36.2 °C (97.2 °F), height 1.803 m (5' 11\"), weight 117.9 kg (260 lb).    Physical Exam   Constitutional: He is oriented to person, place, and time and well-developed, well-nourished, and in no distress.   HENT:   Head: Normocephalic and atraumatic.       Right Ear: External ear normal.   Left Ear: External ear normal.   Nose: Nose normal.   Mouth/Throat: No oropharyngeal exudate.   Eyes: Conjunctivae and lids are normal.   Neck: Normal range of motion.   Pulmonary/Chest: Effort normal.   Neurological: He is alert and oriented to person, place, and time.   Skin: Skin is warm and dry.        Psychiatric: Mood and affect normal.       DATA: none applicable to review    Assessment and Plan:     1. EIC (epidermal inclusion cyst)  - plan for surgical removal per patient's schedule     2. 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 Time out completed. Area of biopsy prepped with alcohol. Anesthesia with 1% lidocaine with epinephrine administered with 30 gauge needle. Shave biopsy of the site performed. Hemostasis achieved with pressure and aluminum chloride. Vaseline applied to wound with bandage. Patient tolerated procedure well and there were no complications. The specimen was sent to the pathology lab by the staff. Wound care was discussed.      Followup: Return for excision, cyst; will call with bx f/u.    Lexy Monson M.D.          "

## 2017-11-14 ENCOUNTER — TELEPHONE (OUTPATIENT)
Dept: CARDIOLOGY | Facility: MEDICAL CENTER | Age: 72
End: 2017-11-14

## 2017-11-14 NOTE — TELEPHONE ENCOUNTER
S/w pt and reassured him that we have both the Lipid and CMP results in our system. Pt educated that results can take awhile to be posted to [facility]. He states understanding and appreciative of call. Pt to FU tomorrow with CW.    ----- Message -----  From: Brooke Rosario  Sent: 11/14/2017  10:29 AM  To: Lexy Mosley R.N.  Subject: Patient wants call back about lab work for t#    DILLON/Vivian    Patient has an appt tomorrow, 11/15, with Dr Olvera and the results of his lab work aren't in his chart. He said that he had it done at the [facility] at [place]. He wants a call back at [phone].

## 2017-11-15 ENCOUNTER — OFFICE VISIT (OUTPATIENT)
Dept: CARDIOLOGY | Facility: MEDICAL CENTER | Age: 72
End: 2017-11-15
Payer: MEDICARE

## 2017-11-15 VITALS
WEIGHT: 275 LBS | BODY MASS INDEX: 38.5 KG/M2 | HEIGHT: 71 IN | SYSTOLIC BLOOD PRESSURE: 140 MMHG | HEART RATE: 94 BPM | OXYGEN SATURATION: 96 % | DIASTOLIC BLOOD PRESSURE: 70 MMHG

## 2017-11-15 DIAGNOSIS — I25.83 CORONARY ARTERIOSCLEROSIS DUE TO LIPID RICH PLAQUE: Chronic | ICD-10-CM

## 2017-11-15 DIAGNOSIS — Z95.5 STENTED CORONARY ARTERY: Chronic | ICD-10-CM

## 2017-11-15 DIAGNOSIS — I25.10 CORONARY ARTERIOSCLEROSIS DUE TO LIPID RICH PLAQUE: Chronic | ICD-10-CM

## 2017-11-15 DIAGNOSIS — E78.5 DYSLIPIDEMIA: ICD-10-CM

## 2017-11-15 DIAGNOSIS — I10 ESSENTIAL HYPERTENSION: ICD-10-CM

## 2017-11-15 PROCEDURE — 99214 OFFICE O/P EST MOD 30 MIN: CPT | Performed by: INTERNAL MEDICINE

## 2017-11-15 ASSESSMENT — ENCOUNTER SYMPTOMS
SHORTNESS OF BREATH: 0
SORE THROAT: 0
WEAKNESS: 0
FOCAL WEAKNESS: 1
PND: 0
COUGH: 0
CHILLS: 0
FEVER: 0
PALPITATIONS: 0
BRUISES/BLEEDS EASILY: 0
NAUSEA: 0
CLAUDICATION: 0
BLURRED VISION: 0
ABDOMINAL PAIN: 0
FALLS: 0
DIZZINESS: 0

## 2017-11-16 NOTE — PROGRESS NOTES
"Subjective:   Myke Steele is a 72 y.o. 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 he's remained off statin therapy    Past Medical History:   Diagnosis Date   • Anesthesia 04/07/15    Has never had general anesthesia   • Benign localized hyperplasia of prostate with urinary obstruction and other lower urinary tract symptoms (LUTS)(600.21) 8/9/2012   • Benign prostatic hypertrophy    • Coronary arteriosclerosis due to lipid rich plaque    • Coronary atherosclerosis of native coronary artery 8/9/2012   • Essential hypertriglyceridemia 8/9/2012   • High cholesterol    • Hypercholesterolemia 8/9/2012   • Hypertension    • Lumbar spinal stenosis    • Obesity 8/28/2014   • Pain 04/07/15    Bilateral lower back, radiating down buttocks=\"aches\" worse with activity   • Stented coronary artery 8/9/2012   • Unspecified cataract     R IOL   • Unspecified disorder of thyroid      Past Surgical History:   Procedure Laterality Date   • LUMBAR LAMINECTOMY DISKECTOMY  4/29/2015    Performed by Juan Pablo Betts M.D. at SURGERY [facility]   • OTHER CARDIAC SURGERY  2008    2 Cardiac stents   • STAPEDECTOMY      bilateral ears; only worked on the R ear     Family History   Problem Relation Age of Onset   • Heart Failure Father    • Heart Disease Father      valvular   • Hyperlipidemia Brother      History   Smoking Status   • Never Smoker   Smokeless Tobacco   • Never Used     No Known Allergies  Outpatient Encounter Prescriptions as of 11/15/2017   Medication Sig Dispense Refill   • FEXOFENADINE HCL PO Take  by mouth.     • Flaxseed, Linseed, (FLAX SEEDS PO) Take  by mouth.     • finasteride (PROSCAR) 5 MG Tab Take 5 mg by mouth every day.     • amlodipine (NORVASC) 5 MG Tab Take 1 Tab by mouth every day. 90 Tab 3   • tadalafil (CIALIS) 5 MG tablet Take 1 Tab by " "mouth every day. 90 Tab 1   • carvedilol (COREG) 12.5 MG Tab Take 1 Tab by mouth 2 times a day, with meals. 180 Tab 3   • lisinopril (PRINIVIL, ZESTRIL) 40 MG tablet Take 1 Tab by mouth every day. 90 Tab 3   • levothyroxine (SYNTHROID) 75 MCG Tab TAKE 1 TAB BY MOUTH EVERY DAY. 90 Tab 3   • Non Formulary Request Alpha linolenic acid 550mg     • Coenzyme Q10 (CO Q-10) 100 MG Cap Take 200 mg by mouth every day.     • LevOCARNitine (CARNITINE, L,) Powder 3 g by Does not apply route every day.     • aspirin (ASA) 81 MG Chew Tab chewable tablet Take 243 mg by mouth every day. 100 Tab 11   • azithromycin (ZITHROMAX) 250 MG Tab Take tablets by mouth as directed. (Patient not taking: Reported on 11/15/2017) 6 Tab 1   • amlodipine (NORVASC) 5 MG Tab TAKE 1 TAB BY MOUTH EVERY DAY. 90 Tab 3   • Docusate Sodium (DSS) 100 MG Cap Take 100 mg by mouth 2 Times a Day. (Patient not taking: Reported on 11/15/2017) 180 Cap 1   • Alpha-Lipoic Acid 600 MG Cap Take 600 mg by mouth every day.     • Magnesium 250 MG Tab Take 250 mg by mouth every day.     • Cyanocobalamin (VITAMIN B-12) 5000 MCG SL Tab Place  under tongue.       No facility-administered encounter medications on file as of 11/15/2017.      Review of Systems   Constitutional: Negative for chills and fever.   HENT: Negative for sore throat.    Eyes: Negative for blurred vision.   Respiratory: Negative for cough and shortness of breath.    Cardiovascular: Negative for chest pain, palpitations, claudication, leg swelling and PND.   Gastrointestinal: Negative for abdominal pain and nausea.   Musculoskeletal: Negative for falls and joint pain.   Skin: Negative for rash.   Neurological: Positive for focal weakness. Negative for dizziness and weakness.   Endo/Heme/Allergies: Does not bruise/bleed easily.        Objective:   /70   Pulse 94   Ht 1.803 m (5' 11\")   Wt 124.7 kg (275 lb)   SpO2 96%   BMI 38.35 kg/m²     Physical Exam   Constitutional: No distress.   HENT: "   Mouth/Throat: Oropharynx is clear and moist.   Eyes: No scleral icterus.   Neck: Neck supple. No JVD present.   Cardiovascular: Normal rate, regular rhythm, normal heart sounds and intact distal pulses.  Exam reveals no gallop and no friction rub.    No murmur heard.  Pulmonary/Chest: Effort normal. He has no rales.   Abdominal: Soft. Bowel sounds are normal. There is no tenderness.   Musculoskeletal: He exhibits no edema.   Neurological: He is alert.   Skin: No rash noted. He is not diaphoretic.   Psychiatric: He has a normal mood and affect.       Assessment:     1. Stented coronary artery  REFERRAL TO INTENSIVE CARDIAC REHAB/CARDIAC REHAB   2. Coronary arteriosclerosis due to lipid rich plaque  REFERRAL TO INTENSIVE CARDIAC REHAB/CARDIAC REHAB   3. Dyslipidemia     4. Essential hypertension         Medical Decision Making:  Today's Assessment / Status / Plan:     It was my pleasure to meet with Mr. Steele.    He is quite slow to improve from the concern of his neuromuscular defects is also quite concerned about not being able to take statin therapy again we discussed the PSK9 but are awaiting further results for patients that are unable to take statin therapy to see if they also saw considerable benefit his stenting was remote in 2007 but still he is quite motivated to participate in our cardiac rehab program as he never done cardiac rehab after his stenting he also has peripheral arterial disease based on his CTA    I will see Mr. Steele back in 1 year time and encouraged him to follow up with us over the phone or e-mail using my MyChart as issues arise.    It is my pleasure to participate in the care of Mr. Steele.  Please do not hesitate to contact me with questions or concerns.    Raj Olvera MD PhD FACC  Cardiologist [facility]

## 2017-11-20 ENCOUNTER — TELEPHONE (OUTPATIENT)
Dept: DERMATOLOGY | Facility: IMAGING CENTER | Age: 72
End: 2017-11-20

## 2017-12-11 ENCOUNTER — NON-PROVIDER VISIT (OUTPATIENT)
Dept: INTERNAL MEDICINE | Facility: IMAGING CENTER | Age: 72
End: 2017-12-11
Payer: MEDICARE

## 2017-12-11 DIAGNOSIS — Z23 NEED FOR INFLUENZA VACCINATION: ICD-10-CM

## 2017-12-11 PROCEDURE — 90662 IIV NO PRSV INCREASED AG IM: CPT | Performed by: FAMILY MEDICINE

## 2017-12-11 PROCEDURE — G0008 ADMIN INFLUENZA VIRUS VAC: HCPCS | Performed by: FAMILY MEDICINE

## 2017-12-13 ENCOUNTER — OFFICE VISIT (OUTPATIENT)
Dept: DERMATOLOGY | Facility: IMAGING CENTER | Age: 72
End: 2017-12-13
Payer: MEDICARE

## 2017-12-13 ENCOUNTER — TELEPHONE (OUTPATIENT)
Dept: DERMATOLOGY | Facility: IMAGING CENTER | Age: 72
End: 2017-12-13

## 2017-12-13 ENCOUNTER — HOSPITAL ENCOUNTER (OUTPATIENT)
Facility: MEDICAL CENTER | Age: 72
End: 2017-12-13
Attending: DERMATOLOGY
Payer: MEDICARE

## 2017-12-13 VITALS
BODY MASS INDEX: 35 KG/M2 | HEIGHT: 71 IN | WEIGHT: 250 LBS | DIASTOLIC BLOOD PRESSURE: 62 MMHG | SYSTOLIC BLOOD PRESSURE: 130 MMHG | TEMPERATURE: 97.4 F

## 2017-12-13 DIAGNOSIS — R20.9 DISTURBANCE OF SKIN SENSATION: ICD-10-CM

## 2017-12-13 DIAGNOSIS — L72.0 EIC (EPIDERMAL INCLUSION CYST): ICD-10-CM

## 2017-12-13 PROCEDURE — 88304 TISSUE EXAM BY PATHOLOGIST: CPT

## 2017-12-13 PROCEDURE — 11422 EXC H-F-NK-SP B9+MARG 1.1-2: CPT | Performed by: DERMATOLOGY

## 2017-12-13 PROCEDURE — 12041 INTMD RPR N-HF/GENIT 2.5CM/<: CPT | Performed by: DERMATOLOGY

## 2017-12-13 RX ORDER — DOXYCYCLINE HYCLATE 100 MG
100 TABLET ORAL 2 TIMES DAILY
Qty: 14 TAB | Refills: 0 | Status: SHIPPED | OUTPATIENT
Start: 2017-12-13 | End: 2017-12-20

## 2017-12-13 NOTE — PROGRESS NOTES
"BENIGN EXCISION PROCEDURE NOTE:    Risks, benefits and alternatives of procedure, including, but not limited to scar/poor cosmetic outcome, bleeding, pain, infection, nerve damage, recurrence of lesion, failed surgery, and need for further surgery, were discussed and written informed consent obtained. Verbal time out completed.     Allergies reviewed: Yes  Pacemaker/defibrillator: No  Artificial joints: No  Antibiotics given: No (not pre-op)    Pre-op diagnosis: EIC  Post-op diagnosis: Same  Site: right posterior neck/base of occipital scalp  Pre-op size:[id number]    Blood pressure 130/62, temperature 36.3 °C (97.4 °F), height 1.803 m (5' 11\"), weight 113.4 kg (250 lb).    Procedure: Area of surgery was prepped with alcohol, marked with a sterile marking pen. Anesthesia with 1% lidocaine with epinephrine administered with 30 gauge needle. The area was again cleaned with povidine-iodine swab. With sterile technique, a 15 blade scalpel was used to make an elliptical incision over the lesion to the level of the subcutaneous fat. Dissection was completed with iris/tissue scissors, and the mass was removed. Hemostasis was achieved with pressure.  Specimen was placed into biopsy container and sent to pathology by staff.    Intermediate closure note:  4.0 monocryl buried vertical mattress sutures were placed x 4 to close dead space. 4.0 prolene running superficial suture was placed to approximate wound edge.  Vaseline applied to wound with bandage. Patient tolerated procedure well and there were no complications, blood loss was minimal.     Final wound size: 25mm    Bandage was placed with vaseline, telfa, gauze and tape. Wound care was discussed with the patient, and written instructions were provided. Patient to return to clinic in 7-10 days for suture removal. Patient to call us if any problems or concerns with the procedure site arise prior to his scheduled appointment.    Superficial BCC on the left forearm completely " excised, no evidence of residual lesion on exam today. After suture removal, will discuss NILSON for next visit.    Lexy Monson M.D.

## 2017-12-14 ENCOUNTER — TELEPHONE (OUTPATIENT)
Dept: DERMATOLOGY | Facility: IMAGING CENTER | Age: 72
End: 2017-12-14

## 2017-12-19 ENCOUNTER — OFFICE VISIT (OUTPATIENT)
Dept: DERMATOLOGY | Facility: IMAGING CENTER | Age: 72
End: 2017-12-19
Payer: MEDICARE

## 2017-12-19 DIAGNOSIS — L72.0 EIC (EPIDERMAL INCLUSION CYST): ICD-10-CM

## 2017-12-19 NOTE — PROGRESS NOTES
Patient seen by [facility]) for suture removal today. No problems with surgical site. Further f/u has been discussed.    Has spot on the left lower cutaneous lip - present x years, slight increase in size, cuts it when shaving. We discussed return for biopsy of the spot.    Return for biopsy - early january.    Lexy Monson

## 2017-12-28 ENCOUNTER — HOSPITAL ENCOUNTER (OUTPATIENT)
Facility: MEDICAL CENTER | Age: 72
End: 2017-12-29
Attending: EMERGENCY MEDICINE | Admitting: HOSPITALIST
Payer: MEDICARE

## 2017-12-28 ENCOUNTER — RESOLUTE PROFESSIONAL BILLING HOSPITAL PROF FEE (OUTPATIENT)
Dept: HOSPITALIST | Facility: MEDICAL CENTER | Age: 72
End: 2017-12-28
Payer: MEDICARE

## 2017-12-28 ENCOUNTER — APPOINTMENT (OUTPATIENT)
Dept: RADIOLOGY | Facility: MEDICAL CENTER | Age: 72
End: 2017-12-28
Attending: EMERGENCY MEDICINE
Payer: MEDICARE

## 2017-12-28 ENCOUNTER — TELEPHONE (OUTPATIENT)
Dept: INTERNAL MEDICINE | Facility: IMAGING CENTER | Age: 72
End: 2017-12-28

## 2017-12-28 DIAGNOSIS — R07.9 CHEST PAIN, UNSPECIFIED TYPE: ICD-10-CM

## 2017-12-28 PROBLEM — E03.9 HYPOTHYROID: Status: ACTIVE | Noted: 2017-12-28

## 2017-12-28 LAB
ALBUMIN SERPL BCP-MCNC: 4.2 G/DL (ref 3.2–4.9)
ALBUMIN/GLOB SERPL: 1.8 G/DL
ALP SERPL-CCNC: 74 U/L (ref 30–99)
ALT SERPL-CCNC: 32 U/L (ref 2–50)
ANION GAP SERPL CALC-SCNC: 8 MMOL/L (ref 0–11.9)
APTT PPP: 30.4 SEC (ref 24.7–36)
AST SERPL-CCNC: 21 U/L (ref 12–45)
BASOPHILS # BLD AUTO: 0.6 % (ref 0–1.8)
BASOPHILS # BLD: 0.07 K/UL (ref 0–0.12)
BILIRUB SERPL-MCNC: 0.7 MG/DL (ref 0.1–1.5)
BNP SERPL-MCNC: 26 PG/ML (ref 0–100)
BUN SERPL-MCNC: 29 MG/DL (ref 8–22)
CALCIUM SERPL-MCNC: 10 MG/DL (ref 8.5–10.5)
CHLORIDE SERPL-SCNC: 105 MMOL/L (ref 96–112)
CO2 SERPL-SCNC: 23 MMOL/L (ref 20–33)
CREAT SERPL-MCNC: 0.85 MG/DL (ref 0.5–1.4)
EKG IMPRESSION: NORMAL
EOSINOPHIL # BLD AUTO: 0.08 K/UL (ref 0–0.51)
EOSINOPHIL NFR BLD: 0.7 % (ref 0–6.9)
ERYTHROCYTE [DISTWIDTH] IN BLOOD BY AUTOMATED COUNT: 44.1 FL (ref 35.9–50)
EST. AVERAGE GLUCOSE BLD GHB EST-MCNC: 137 MG/DL
GFR SERPL CREATININE-BSD FRML MDRD: >60 ML/MIN/1.73 M 2
GLOBULIN SER CALC-MCNC: 2.4 G/DL (ref 1.9–3.5)
GLUCOSE SERPL-MCNC: 167 MG/DL (ref 65–99)
HBA1C MFR BLD: 6.4 % (ref 0–5.6)
HCT VFR BLD AUTO: 47.7 % (ref 42–52)
HGB BLD-MCNC: 16 G/DL (ref 14–18)
IMM GRANULOCYTES # BLD AUTO: 0.09 K/UL (ref 0–0.11)
IMM GRANULOCYTES NFR BLD AUTO: 0.8 % (ref 0–0.9)
INR PPP: 1.01 (ref 0.87–1.13)
LIPASE SERPL-CCNC: 20 U/L (ref 11–82)
LYMPHOCYTES # BLD AUTO: 1.31 K/UL (ref 1–4.8)
LYMPHOCYTES NFR BLD: 12 % (ref 22–41)
MCH RBC QN AUTO: 30 PG (ref 27–33)
MCHC RBC AUTO-ENTMCNC: 33.5 G/DL (ref 33.7–35.3)
MCV RBC AUTO: 89.3 FL (ref 81.4–97.8)
MONOCYTES # BLD AUTO: 0.88 K/UL (ref 0–0.85)
MONOCYTES NFR BLD AUTO: 8.1 % (ref 0–13.4)
NEUTROPHILS # BLD AUTO: 8.45 K/UL (ref 1.82–7.42)
NEUTROPHILS NFR BLD: 77.8 % (ref 44–72)
NRBC # BLD AUTO: 0 K/UL
NRBC BLD-RTO: 0 /100 WBC
PLATELET # BLD AUTO: 154 K/UL (ref 164–446)
PMV BLD AUTO: 10.9 FL (ref 9–12.9)
POTASSIUM SERPL-SCNC: 4.1 MMOL/L (ref 3.6–5.5)
PROT SERPL-MCNC: 6.6 G/DL (ref 6–8.2)
PROTHROMBIN TIME: 13 SEC (ref 12–14.6)
RBC # BLD AUTO: 5.34 M/UL (ref 4.7–6.1)
SODIUM SERPL-SCNC: 136 MMOL/L (ref 135–145)
TROPONIN I SERPL-MCNC: <0.01 NG/ML (ref 0–0.04)
TSH SERPL DL<=0.005 MIU/L-ACNC: 1.68 UIU/ML (ref 0.38–5.33)
WBC # BLD AUTO: 10.9 K/UL (ref 4.8–10.8)

## 2017-12-28 PROCEDURE — 700102 HCHG RX REV CODE 250 W/ 637 OVERRIDE(OP): Performed by: HOSPITALIST

## 2017-12-28 PROCEDURE — 83880 ASSAY OF NATRIURETIC PEPTIDE: CPT

## 2017-12-28 PROCEDURE — 700111 HCHG RX REV CODE 636 W/ 250 OVERRIDE (IP): Performed by: HOSPITALIST

## 2017-12-28 PROCEDURE — A9270 NON-COVERED ITEM OR SERVICE: HCPCS | Performed by: HOSPITALIST

## 2017-12-28 PROCEDURE — G0378 HOSPITAL OBSERVATION PER HR: HCPCS

## 2017-12-28 PROCEDURE — 99285 EMERGENCY DEPT VISIT HI MDM: CPT

## 2017-12-28 PROCEDURE — 71010 DX-CHEST-LIMITED (1 VIEW): CPT | Performed by: EMERGENCY MEDICINE

## 2017-12-28 PROCEDURE — 71010 DX-CHEST-LIMITED (1 VIEW): CPT

## 2017-12-28 PROCEDURE — 80053 COMPREHEN METABOLIC PANEL: CPT

## 2017-12-28 PROCEDURE — 93005 ELECTROCARDIOGRAM TRACING: CPT | Performed by: EMERGENCY MEDICINE

## 2017-12-28 PROCEDURE — 96372 THER/PROPH/DIAG INJ SC/IM: CPT

## 2017-12-28 PROCEDURE — 85025 COMPLETE CBC W/AUTO DIFF WBC: CPT

## 2017-12-28 PROCEDURE — 99220 PR INITIAL OBSERVATION CARE,LEVL III: CPT | Performed by: HOSPITALIST

## 2017-12-28 PROCEDURE — 36415 COLL VENOUS BLD VENIPUNCTURE: CPT

## 2017-12-28 PROCEDURE — 84484 ASSAY OF TROPONIN QUANT: CPT

## 2017-12-28 PROCEDURE — 85730 THROMBOPLASTIN TIME PARTIAL: CPT

## 2017-12-28 PROCEDURE — 83690 ASSAY OF LIPASE: CPT

## 2017-12-28 PROCEDURE — 85610 PROTHROMBIN TIME: CPT

## 2017-12-28 PROCEDURE — 84443 ASSAY THYROID STIM HORMONE: CPT

## 2017-12-28 PROCEDURE — 93005 ELECTROCARDIOGRAM TRACING: CPT

## 2017-12-28 PROCEDURE — 83036 HEMOGLOBIN GLYCOSYLATED A1C: CPT

## 2017-12-28 RX ORDER — AMOXICILLIN 250 MG
2 CAPSULE ORAL 2 TIMES DAILY
Status: DISCONTINUED | OUTPATIENT
Start: 2017-12-29 | End: 2017-12-29 | Stop reason: HOSPADM

## 2017-12-28 RX ORDER — OXYCODONE HYDROCHLORIDE 5 MG/1
2.5 TABLET ORAL
Status: DISCONTINUED | OUTPATIENT
Start: 2017-12-28 | End: 2017-12-28

## 2017-12-28 RX ORDER — OXYCODONE HYDROCHLORIDE 5 MG/1
5 TABLET ORAL
Status: DISCONTINUED | OUTPATIENT
Start: 2017-12-28 | End: 2017-12-28

## 2017-12-28 RX ORDER — LEVOTHYROXINE SODIUM 0.07 MG/1
75 TABLET ORAL
Status: DISCONTINUED | OUTPATIENT
Start: 2017-12-29 | End: 2017-12-29 | Stop reason: HOSPADM

## 2017-12-28 RX ORDER — HYDROMORPHONE HYDROCHLORIDE 2 MG/ML
0.25 INJECTION, SOLUTION INTRAMUSCULAR; INTRAVENOUS; SUBCUTANEOUS
Status: DISCONTINUED | OUTPATIENT
Start: 2017-12-28 | End: 2017-12-29 | Stop reason: HOSPADM

## 2017-12-28 RX ORDER — OXYCODONE HYDROCHLORIDE 5 MG/1
2.5 TABLET ORAL
Status: DISCONTINUED | OUTPATIENT
Start: 2017-12-28 | End: 2017-12-29 | Stop reason: HOSPADM

## 2017-12-28 RX ORDER — HEPARIN SODIUM 5000 [USP'U]/ML
5000 INJECTION, SOLUTION INTRAVENOUS; SUBCUTANEOUS EVERY 8 HOURS
Status: DISCONTINUED | OUTPATIENT
Start: 2017-12-28 | End: 2017-12-29 | Stop reason: HOSPADM

## 2017-12-28 RX ORDER — AMLODIPINE BESYLATE 5 MG/1
5 TABLET ORAL DAILY
Status: DISCONTINUED | OUTPATIENT
Start: 2017-12-28 | End: 2017-12-29 | Stop reason: HOSPADM

## 2017-12-28 RX ORDER — CARVEDILOL 12.5 MG/1
12.5 TABLET ORAL 2 TIMES DAILY WITH MEALS
Status: DISCONTINUED | OUTPATIENT
Start: 2017-12-28 | End: 2017-12-29 | Stop reason: HOSPADM

## 2017-12-28 RX ORDER — LISINOPRIL 20 MG/1
40 TABLET ORAL DAILY
Status: DISCONTINUED | OUTPATIENT
Start: 2017-12-29 | End: 2017-12-29 | Stop reason: HOSPADM

## 2017-12-28 RX ORDER — ASPIRIN 81 MG/1
243 TABLET, CHEWABLE ORAL DAILY
Status: DISCONTINUED | OUTPATIENT
Start: 2017-12-28 | End: 2017-12-28

## 2017-12-28 RX ORDER — OXYCODONE HYDROCHLORIDE 5 MG/1
5 TABLET ORAL
Status: DISCONTINUED | OUTPATIENT
Start: 2017-12-28 | End: 2017-12-29 | Stop reason: HOSPADM

## 2017-12-28 RX ORDER — ASPIRIN 81 MG/1
324 TABLET, CHEWABLE ORAL DAILY
Status: DISCONTINUED | OUTPATIENT
Start: 2017-12-29 | End: 2017-12-29 | Stop reason: HOSPADM

## 2017-12-28 RX ORDER — ASPIRIN 325 MG
325 TABLET ORAL DAILY
Status: DISCONTINUED | OUTPATIENT
Start: 2017-12-29 | End: 2017-12-29 | Stop reason: HOSPADM

## 2017-12-28 RX ORDER — ONDANSETRON 2 MG/ML
4 INJECTION INTRAMUSCULAR; INTRAVENOUS EVERY 4 HOURS PRN
Status: DISCONTINUED | OUTPATIENT
Start: 2017-12-28 | End: 2017-12-29 | Stop reason: HOSPADM

## 2017-12-28 RX ORDER — BISACODYL 10 MG
10 SUPPOSITORY, RECTAL RECTAL
Status: DISCONTINUED | OUTPATIENT
Start: 2017-12-28 | End: 2017-12-29 | Stop reason: HOSPADM

## 2017-12-28 RX ORDER — ONDANSETRON 4 MG/1
4 TABLET, ORALLY DISINTEGRATING ORAL EVERY 4 HOURS PRN
Status: DISCONTINUED | OUTPATIENT
Start: 2017-12-28 | End: 2017-12-29 | Stop reason: HOSPADM

## 2017-12-28 RX ORDER — FINASTERIDE 5 MG/1
5 TABLET, FILM COATED ORAL DAILY
Status: DISCONTINUED | OUTPATIENT
Start: 2017-12-28 | End: 2017-12-29 | Stop reason: HOSPADM

## 2017-12-28 RX ORDER — POLYETHYLENE GLYCOL 3350 17 G/17G
1 POWDER, FOR SOLUTION ORAL
Status: DISCONTINUED | OUTPATIENT
Start: 2017-12-28 | End: 2017-12-29 | Stop reason: HOSPADM

## 2017-12-28 RX ORDER — ASPIRIN 300 MG/1
300 SUPPOSITORY RECTAL DAILY
Status: DISCONTINUED | OUTPATIENT
Start: 2017-12-29 | End: 2017-12-29 | Stop reason: HOSPADM

## 2017-12-28 RX ADMIN — FINASTERIDE 5 MG: 5 TABLET, FILM COATED ORAL at 17:51

## 2017-12-28 RX ADMIN — CARVEDILOL 12.5 MG: 12.5 TABLET, FILM COATED ORAL at 17:51

## 2017-12-28 RX ADMIN — HEPARIN SODIUM 5000 UNITS: 5000 INJECTION, SOLUTION INTRAVENOUS; SUBCUTANEOUS at 21:13

## 2017-12-28 RX ADMIN — AMLODIPINE BESYLATE 5 MG: 5 TABLET ORAL at 17:50

## 2017-12-28 ASSESSMENT — ENCOUNTER SYMPTOMS
WEAKNESS: 0
COUGH: 0
MYALGIAS: 0
FLANK PAIN: 0
FEVER: 0
DOUBLE VISION: 0
DIARRHEA: 0
VOMITING: 0
SINUS PAIN: 0
SENSORY CHANGE: 0
EYE DISCHARGE: 0
HEADACHES: 0
SPEECH CHANGE: 0
DIAPHORESIS: 0
NAUSEA: 0
BLURRED VISION: 0
ABDOMINAL PAIN: 0
BRUISES/BLEEDS EASILY: 0
HEARTBURN: 0
DEPRESSION: 0
CHILLS: 0
SHORTNESS OF BREATH: 0
PALPITATIONS: 0

## 2017-12-28 ASSESSMENT — PAIN SCALES - GENERAL
PAINLEVEL_OUTOF10: 0

## 2017-12-28 ASSESSMENT — LIFESTYLE VARIABLES
ALCOHOL_USE: NO
SUBSTANCE_ABUSE: 0
DO YOU DRINK ALCOHOL: NO
EVER_SMOKED: NEVER

## 2017-12-28 ASSESSMENT — COPD QUESTIONNAIRES
HAVE YOU SMOKED AT LEAST 100 CIGARETTES IN YOUR ENTIRE LIFE: NO/DON'T KNOW
DURING THE PAST 4 WEEKS HOW MUCH DID YOU FEEL SHORT OF BREATH: NONE/LITTLE OF THE TIME
DO YOU EVER COUGH UP ANY MUCUS OR PHLEGM?: NO/ONLY WITH OCCASIONAL COLDS OR INFECTIONS
COPD SCREENING SCORE: 2

## 2017-12-28 NOTE — DISCHARGE PLANNING
Care Transition Team Assessment    Information Source  Orientation : Oriented x 4  Information Given By: Patient  Who is responsible for making decisions for patient? : Patient         Elopement Risk  Legal Hold: No  Ambulatory or Self Mobile in Wheelchair: No-Not an Elopement Risk    Interdisciplinary Discharge Planning  Primary Care Physician: Dr. George  Lives with - Patient's Self Care Capacity: Spouse  Support Systems: Spouse / Significant Other  Able to Return to Previous ADL's: Yes  Mobility Issues: No (uses cane for balance)  Prior Services: None  Patient Expects to be Discharged to::  (home)  Assistance Needed: No  Durable Medical Equipment: Not Applicable              Finances  Financial Barriers to Discharge: No  Prescription Coverage: Yes              Advance Directive  Advance Directive?: None  Advance Directive offered?: AD Booklet given              Discharge Risks or Barriers  Discharge risks or barriers?: No

## 2017-12-28 NOTE — ED NOTES
Chief Complaint   Patient presents with   • Chest Pain     started last night   • Neck Pain     shoulder pain on/off      Pt ambulated to triage c/o chest pain since yesterday.   Protocol initiated,blood sent to lab.

## 2017-12-28 NOTE — ED PROVIDER NOTES
ED Provider Note    Scribed for Dane Abraham M.D. by Nirmala Espinoza. 12/28/2017, 1:45 PM.    Primary care provider: Shelly George M.D.; Dr. Raj Olvera  Means of arrival: Walk in  History obtained from: Patient  History limited by: None    CHIEF COMPLAINT  Chief Complaint   Patient presents with   • Chest Pain     HPI  Myke Steele is a 72 y.o. male who presents to the Emergency Department for chest pain. Patient reports a history of chronic left shoulder pain.  Symptoms developed last night with an acute onset left upper chest pain.  He experienced gradually worsening chest pain this morning described as a pressure. Aspirin was taken, and symptoms resolved at 9:00 AM.  Associated symptoms include bilateral leg swelling. Negative fever, chills, diaphoresis, shortness of breath, palpitations, leg pain, abdominal pain, nausea or vomiting. Patient denies a history of smoking. He reports two cardiac stents. History of hypertension and coronary arteriosclerosis. He is followed by Dr. Raj Olvera, Cardiology.      REVIEW OF SYSTEMS  Review of Systems   Constitutional: Negative for chills, diaphoresis and fever.   Respiratory: Negative for shortness of breath.    Cardiovascular: Positive for chest pain (left upper chest) and leg swelling (bilateral). Negative for palpitations.   Gastrointestinal: Negative for abdominal pain, nausea and vomiting.   Musculoskeletal:        Positive chronic left shoulder pain. Negative for leg pain.   All other systems reviewed and are negative.    See HPI for further details. C.      PAST MEDICAL HISTORY  Patient has a past medical history of Anesthesia (04/07/15); Benign localized hyperplasia of prostate with urinary obstruction and other lower urinary tract symptoms (LUTS)(600.21) (8/9/2012); Benign prostatic hypertrophy; Coronary arteriosclerosis due to lipid rich plaque; Coronary atherosclerosis of native coronary artery (8/9/2012); Essential  "hypertriglyceridemia (8/9/2012); High cholesterol; Hypercholesterolemia (8/9/2012); Hypertension; Lumbar spinal stenosis; Obesity (8/28/2014); Pain (04/07/15); Stented coronary artery (8/9/2012); Unspecified cataract; and Unspecified disorder of thyroid.      SURGICAL HISTORY  Patient has a past surgical history that includes stapedectomy; other cardiac surgery (2008); and lumbar laminectomy diskectomy (4/29/2015).      SOCIAL HISTORY  Social History   Substance Use Topics   • Smoking status: Never Smoker   • Smokeless tobacco: Never Used   • Alcohol use Yes      Comment: 2-3/week      History   Drug Use No       FAMILY HISTORY  Family History   Problem Relation Age of Onset   • Heart Failure Father    • Heart Disease Father      valvular   • Hyperlipidemia Brother        CURRENT MEDICATIONS  Home Medications    **Home medications have not yet been reviewed for this encounter**         ALLERGIES  None      PHYSICAL EXAM  VITAL SIGNS: /81   Pulse 70   Temp 36.3 °C (97.4 °F)   Resp 16   Ht 1.803 m (5' 11\")   Wt 124.9 kg (275 lb 5.7 oz)   SpO2 95%   BMI 38.40 kg/m²     Constitutional:  Mild acute distress  HENT:  Moist mucous membranes  Eyes:  No conjunctivitis or icterus  Neck: trachea is midline, no palpable thyroid  Lymphatic:  No cervical lymphadenopathy  Cardiovascular:  Regular rate and rhythm, no murmurs  Thorax & Lungs:  Normal breath sounds, no rhonchi  Abdomen:  Obese, Soft, Non-tender  Skin: No rash  Back:  Non-tender, no CVA tenderness  Extremities:  Trace edema to bilateral lower extremities  Vascular:  symmetric radial pulse    Neurologic:  Normal gross motor    LABS  Labs Reviewed   CBC WITH DIFFERENTIAL - Abnormal; Notable for the following:        Result Value    WBC 10.9 (*)     MCHC 33.5 (*)     Platelet Count 154 (*)     Neutrophils-Polys 77.80 (*)     Lymphocytes 12.00 (*)     Neutrophils (Absolute) 8.45 (*)     Monos (Absolute) 0.88 (*)     All other components within normal limits    " Narrative:     Indicate which anticoagulants the patient is on:->UNKNOWN   COMP METABOLIC PANEL - Abnormal; Notable for the following:     Glucose 167 (*)     Bun 29 (*)     All other components within normal limits    Narrative:     Indicate which anticoagulants the patient is on:->UNKNOWN   TROPONIN    Narrative:     Indicate which anticoagulants the patient is on:->UNKNOWN   BTYPE NATRIURETIC PEPTIDE    Narrative:     Indicate which anticoagulants the patient is on:->UNKNOWN   PROTHROMBIN TIME    Narrative:     Indicate which anticoagulants the patient is on:->UNKNOWN   APTT    Narrative:     Indicate which anticoagulants the patient is on:->UNKNOWN   LIPASE    Narrative:     Indicate which anticoagulants the patient is on:->UNKNOWN   ESTIMATED GFR    Narrative:     Indicate which anticoagulants the patient is on:->UNKNOWN     All labs reviewed by me.      EKG Interpretation  Interpreted by me  Normal Sinus Rhythm. Rate 84  Normal QTc  Wide QRS; RBBB  Nondiagnostic ST changes  PVC's noted  Unchanged from previous      RADIOLOGY  DX-CHEST-LIMITED (1 VIEW)   Final Result      No consolidation.        The radiologist's interpretation of all radiological studies have been reviewed by me.    COURSE & MEDICAL DECISION MAKING  Pertinent Labs & Imaging studies reviewed. (See chart for details)    1:45 PM Obtained and reviewed patient's electronic medical record which indicates an office visit with Dr. Olvera, Cardiology, on 11/15/17 for CAD with prior stenting.    1:49 PM Patient seen and examined at bedside. Patient presents for chest pain.  Differential Diagnosis include but are not limited to: MI, ACS,     Initial orders in the Emergency Department included XR chest, EKG and laboratory testing: troponin, BNP, CBC with differential, CMP, estimated GFR, prothrombin time, APTT and lipase. Patient reports taking Aspirin this morning.    1:53 PM Spoke with Dr. Wall, Hospitalist, regarding the patient's presenting  symptoms and diagnostic results.        DISPOSITION  Patient will be admitted to Dr. Wall, Hospitalist, in stable condition.      DIAGNOSIS  1. Chest pain, unspecified type           The note accurately reflects work and decisions made by me.  Dane Abraham  12/28/2017  4:31 PM     INirmala (Scribe), am scribing for, and in the presence of, Dane Abraham M.D.    Electronically signed by: Nirmala Espinoza (Scribe), 12/28/2017    Dane MACK M.D. personally performed the services described in this documentation, as scribed by Nirmala Espinoza in my presence, and it is both accurate and complete.

## 2017-12-28 NOTE — H&P
Hospital Medicine History and Physical    Date of Service  12/28/2017    Chief Complaint  Chief Complaint   Patient presents with   • Chest Pain     started last night   • Neck Pain     shoulder pain on/off        History of Presenting Illness  72 y.o. male who presented 12/28/2017 with chest pain. Patient developed acute onset left upper chest pain gradually worsening this morning described as pressure. He took aspirin this morning symptoms are resolved at 9 Am. But then returned as he presented to the emergency department afterwards. He had associated leg swelling as well as feeling lightheaded and some nausea. States his head felt very heavy. He has a history of left sided chronic shoulder pain. Denies any shortness of breath palpitations diaphoresis. He has a history of cardiac stents in the past he follows with Dr. Raj Olvera cardiology. Has a history of hypertension and CAD.    Primary Care Physician  Shelly George M.D.    Consultants  None    Code Status  Full    Review of Systems  Review of Systems   Constitutional: Negative for chills and fever.   HENT: Negative for congestion, hearing loss and sinus pain.    Eyes: Negative for blurred vision, double vision and discharge.   Respiratory: Negative for cough and shortness of breath.    Cardiovascular: Positive for chest pain and leg swelling.   Gastrointestinal: Negative for abdominal pain, diarrhea, heartburn, nausea and vomiting.   Genitourinary: Negative for dysuria and flank pain.   Musculoskeletal: Positive for joint pain. Negative for myalgias.   Neurological: Negative for sensory change, speech change, weakness and headaches.   Endo/Heme/Allergies: Negative for environmental allergies. Does not bruise/bleed easily.   Psychiatric/Behavioral: Negative for depression and substance abuse.        Past Medical History  Past Medical History:   Diagnosis Date   • Anesthesia 04/07/15    Has never had general anesthesia   • Pain 04/07/15    Bilateral  "lower back, radiating down buttocks=\"aches\" worse with activity   • Obesity 8/28/2014   • Stented coronary artery 8/9/2012   • Coronary atherosclerosis of native coronary artery 8/9/2012   • Benign localized hyperplasia of prostate with urinary obstruction and other lower urinary tract symptoms (LUTS)(600.21) 8/9/2012   • Essential hypertriglyceridemia 8/9/2012   • Hypercholesterolemia 8/9/2012   • Benign prostatic hypertrophy    • Coronary arteriosclerosis due to lipid rich plaque    • High cholesterol    • Hypertension    • Lumbar spinal stenosis    • Unspecified cataract     R IOL   • Unspecified disorder of thyroid        Surgical History  Past Surgical History:   Procedure Laterality Date   • LUMBAR LAMINECTOMY DISKECTOMY  4/29/2015    Performed by Juan Pablo Betts M.D. at SURGERY [facility]   • OTHER CARDIAC SURGERY  2008    2 Cardiac stents   • STAPEDECTOMY      bilateral ears; only worked on the R ear       Medications  No current facility-administered medications on file prior to encounter.      Current Outpatient Prescriptions on File Prior to Encounter   Medication Sig Dispense Refill   • FEXOFENADINE HCL PO Take  by mouth.     • Flaxseed, Linseed, (FLAX SEEDS PO) Take  by mouth.     • finasteride (PROSCAR) 5 MG Tab Take 5 mg by mouth every day.     • azithromycin (ZITHROMAX) 250 MG Tab Take tablets by mouth as directed. (Patient not taking: Reported on 11/15/2017) 6 Tab 1   • amlodipine (NORVASC) 5 MG Tab TAKE 1 TAB BY MOUTH EVERY DAY. 90 Tab 3   • amlodipine (NORVASC) 5 MG Tab Take 1 Tab by mouth every day. 90 Tab 3   • tadalafil (CIALIS) 5 MG tablet Take 1 Tab by mouth every day. 90 Tab 1   • carvedilol (COREG) 12.5 MG Tab Take 1 Tab by mouth 2 times a day, with meals. 180 Tab 3   • lisinopril (PRINIVIL, ZESTRIL) 40 MG tablet Take 1 Tab by mouth every day. 90 Tab 3   • levothyroxine (SYNTHROID) 75 MCG Tab TAKE 1 TAB BY MOUTH EVERY DAY. 90 Tab 3   • Docusate Sodium (DSS) 100 MG Cap Take 100 mg by " mouth 2 Times a Day. (Patient not taking: Reported on 11/15/2017) 180 Cap 1   • Non Formulary Request Alpha linolenic acid 550mg     • Coenzyme Q10 (CO Q-10) 100 MG Cap Take 200 mg by mouth every day.     • Alpha-Lipoic Acid 600 MG Cap Take 600 mg by mouth every day.     • Magnesium 250 MG Tab Take 250 mg by mouth every day.     • LevOCARNitine (CARNITINE, L,) Powder 3 g by Does not apply route every day.     • aspirin (ASA) 81 MG Chew Tab chewable tablet Take 243 mg by mouth every day. 100 Tab 11   • Cyanocobalamin (VITAMIN B-12) 5000 MCG SL Tab Place  under tongue.         Family History  Family History   Problem Relation Age of Onset   • Heart Failure Father    • Heart Disease Father      valvular   • Hyperlipidemia Brother        Social History  Social History   Substance Use Topics   • Smoking status: Never Smoker   • Smokeless tobacco: Never Used   • Alcohol use Yes      Comment: 2-3/week       Allergies  No Known Allergies     Physical Exam  Laboratory   Hemodynamics  Temp (24hrs), Av.3 °C (97.4 °F), Min:36.3 °C (97.4 °F), Max:36.3 °C (97.4 °F)   Temperature: 36.3 °C (97.4 °F)  Pulse  Av.3  Min: 62  Max: 88 Heart Rate (Monitored): 62  Blood Pressure : 131/81, NIBP: 138/74      Respiratory      Respiration: 16, Pulse Oximetry: 98 %             Physical Exam   Constitutional: He is oriented to person, place, and time. He appears well-developed and well-nourished.   HENT:   Head: Normocephalic and atraumatic.   Eyes: Conjunctivae and EOM are normal. Pupils are equal, round, and reactive to light.   Neck: Normal range of motion. Neck supple. No JVD present.   Cardiovascular: Normal rate, regular rhythm, normal heart sounds and intact distal pulses.    No murmur heard.  Pulmonary/Chest: Effort normal and breath sounds normal. No respiratory distress. He exhibits tenderness.   Abdominal: Soft. Bowel sounds are normal. He exhibits no distension. There is no tenderness.   Musculoskeletal: Normal range of  motion.   Bilateral lower extremity edema   Neurological: He is alert and oriented to person, place, and time. No cranial nerve deficit. He exhibits normal muscle tone.   Skin: Skin is warm and dry. No erythema.   Psychiatric: He has a normal mood and affect. His behavior is normal. Judgment and thought content normal.   Nursing note and vitals reviewed.      Recent Labs      12/28/17   0848   WBC  10.9*   RBC  5.34   HEMOGLOBIN  16.0   HEMATOCRIT  47.7   MCV  89.3   MCH  30.0   MCHC  33.5*   RDW  44.1   PLATELETCT  154*   MPV  10.9     Recent Labs      12/28/17   0848   SODIUM  136   POTASSIUM  4.1   CHLORIDE  105   CO2  23   GLUCOSE  167*   BUN  29*   CREATININE  0.85   CALCIUM  10.0     Recent Labs      12/28/17   0848   ALTSGPT  32   ASTSGOT  21   ALKPHOSPHAT  74   TBILIRUBIN  0.7   LIPASE  20   GLUCOSE  167*     Recent Labs      12/28/17   0848   APTT  30.4   INR  1.01     Recent Labs      12/28/17   0848   BNPBTYPENAT  26         Lab Results   Component Value Date    TROPONINI <0.01 12/28/2017     Urinalysis:    Lab Results  Component Value Date/Time   SPECGRAVITY 1.026 05/05/2015 0110   GLUCOSEUR Negative 05/05/2015 0110   KETONES Trace (A) 05/05/2015 0110   NITRITE Negative 05/05/2015 0110   WBCURINE 5-10 (A) 05/05/2015 0110   RBCURINE 5-10 (A) 05/05/2015 0110   BACTERIA Few (A) 05/05/2015 0110   EPITHELCELL Few 05/05/2015 0110        Imaging  DX-CHEST-LIMITED (1 VIEW) [[id number]] Resulted: 12/28/17 1121   Order Status: Completed Updated: 12/28/17 1124   Narrative:       12/28/2017 11:05 AM    HISTORY/REASON FOR EXAM:  Left chest pain    TECHNIQUE/EXAM DESCRIPTION AND NUMBER OF VIEWS:  Single AP view of the chest.    COMPARISON: 8/19/2016    FINDINGS:    Hypoinflation.  Prominent cardiovascular silhouette.  There is no pleural effusion.  No consolidation.     Impression:       No consolidation.        Assessment/Plan     I anticipate this patient is appropriate for observation status at this time.    * Chest  pain   Assessment & Plan    Needs acs r/o multiple risk factors previous hx of 2 stents  Trend trops  EKG prn for chest pain   Stress test in am  Trial GI cocktail   tele        Hypothyroid   Assessment & Plan    Cont levo        Neuropathy (CMS-HCC)- (present on admission)   Assessment & Plan    Chronic follows with neuro        Vascular claudication (CMS-HCC)- (present on admission)   Assessment & Plan    Hx of no complaints currenlty on asa no statin 2/2 to reaction myopathy        Essential hypertension- (present on admission)   Assessment & Plan    Cont home Bp meds        BPH (benign prostatic hypertrophy) with urinary obstruction- (present on admission)   Assessment & Plan    Cont proscar        Stented coronary artery- (present on admission)   Assessment & Plan    On asa, on coreg. . No statin 2/2 to myopathy SE            VTE prophylaxis: heparin.

## 2017-12-29 ENCOUNTER — APPOINTMENT (OUTPATIENT)
Dept: RADIOLOGY | Facility: MEDICAL CENTER | Age: 72
End: 2017-12-29
Attending: HOSPITALIST
Payer: MEDICARE

## 2017-12-29 ENCOUNTER — PATIENT OUTREACH (OUTPATIENT)
Dept: HEALTH INFORMATION MANAGEMENT | Facility: OTHER | Age: 72
End: 2017-12-29

## 2017-12-29 VITALS
HEIGHT: 71 IN | SYSTOLIC BLOOD PRESSURE: 154 MMHG | DIASTOLIC BLOOD PRESSURE: 81 MMHG | BODY MASS INDEX: 38.27 KG/M2 | TEMPERATURE: 97.1 F | RESPIRATION RATE: 19 BRPM | HEART RATE: 69 BPM | WEIGHT: 273.37 LBS | OXYGEN SATURATION: 98 %

## 2017-12-29 LAB
ALBUMIN SERPL BCP-MCNC: 4 G/DL (ref 3.2–4.9)
ALBUMIN/GLOB SERPL: 1.7 G/DL
ALP SERPL-CCNC: 65 U/L (ref 30–99)
ALT SERPL-CCNC: 33 U/L (ref 2–50)
ANION GAP SERPL CALC-SCNC: 7 MMOL/L (ref 0–11.9)
AST SERPL-CCNC: 22 U/L (ref 12–45)
BASOPHILS # BLD AUTO: 0.6 % (ref 0–1.8)
BASOPHILS # BLD: 0.06 K/UL (ref 0–0.12)
BILIRUB SERPL-MCNC: 0.7 MG/DL (ref 0.1–1.5)
BUN SERPL-MCNC: 23 MG/DL (ref 8–22)
CALCIUM SERPL-MCNC: 10.2 MG/DL (ref 8.5–10.5)
CHLORIDE SERPL-SCNC: 103 MMOL/L (ref 96–112)
CHOLEST SERPL-MCNC: 194 MG/DL (ref 100–199)
CO2 SERPL-SCNC: 26 MMOL/L (ref 20–33)
CREAT SERPL-MCNC: 0.7 MG/DL (ref 0.5–1.4)
EOSINOPHIL # BLD AUTO: 0.12 K/UL (ref 0–0.51)
EOSINOPHIL NFR BLD: 1.2 % (ref 0–6.9)
ERYTHROCYTE [DISTWIDTH] IN BLOOD BY AUTOMATED COUNT: 45 FL (ref 35.9–50)
GFR SERPL CREATININE-BSD FRML MDRD: >60 ML/MIN/1.73 M 2
GLOBULIN SER CALC-MCNC: 2.3 G/DL (ref 1.9–3.5)
GLUCOSE SERPL-MCNC: 119 MG/DL (ref 65–99)
HCT VFR BLD AUTO: 46.3 % (ref 42–52)
HDLC SERPL-MCNC: 42 MG/DL
HGB BLD-MCNC: 15.4 G/DL (ref 14–18)
IMM GRANULOCYTES # BLD AUTO: 0.08 K/UL (ref 0–0.11)
IMM GRANULOCYTES NFR BLD AUTO: 0.8 % (ref 0–0.9)
LDLC SERPL CALC-MCNC: 108 MG/DL
LYMPHOCYTES # BLD AUTO: 2.15 K/UL (ref 1–4.8)
LYMPHOCYTES NFR BLD: 20.8 % (ref 22–41)
MCH RBC QN AUTO: 30 PG (ref 27–33)
MCHC RBC AUTO-ENTMCNC: 33.3 G/DL (ref 33.7–35.3)
MCV RBC AUTO: 90.1 FL (ref 81.4–97.8)
MONOCYTES # BLD AUTO: 0.97 K/UL (ref 0–0.85)
MONOCYTES NFR BLD AUTO: 9.4 % (ref 0–13.4)
NEUTROPHILS # BLD AUTO: 6.94 K/UL (ref 1.82–7.42)
NEUTROPHILS NFR BLD: 67.2 % (ref 44–72)
NRBC # BLD AUTO: 0 K/UL
NRBC BLD-RTO: 0 /100 WBC
PLATELET # BLD AUTO: 169 K/UL (ref 164–446)
PMV BLD AUTO: 10.9 FL (ref 9–12.9)
POTASSIUM SERPL-SCNC: 3.9 MMOL/L (ref 3.6–5.5)
PROT SERPL-MCNC: 6.3 G/DL (ref 6–8.2)
RBC # BLD AUTO: 5.14 M/UL (ref 4.7–6.1)
SODIUM SERPL-SCNC: 136 MMOL/L (ref 135–145)
TRIGL SERPL-MCNC: 219 MG/DL (ref 0–149)
WBC # BLD AUTO: 10.3 K/UL (ref 4.8–10.8)

## 2017-12-29 PROCEDURE — 700102 HCHG RX REV CODE 250 W/ 637 OVERRIDE(OP): Performed by: HOSPITALIST

## 2017-12-29 PROCEDURE — A9270 NON-COVERED ITEM OR SERVICE: HCPCS | Performed by: HOSPITALIST

## 2017-12-29 PROCEDURE — 700111 HCHG RX REV CODE 636 W/ 250 OVERRIDE (IP)

## 2017-12-29 PROCEDURE — 80061 LIPID PANEL: CPT

## 2017-12-29 PROCEDURE — A9502 TC99M TETROFOSMIN: HCPCS

## 2017-12-29 PROCEDURE — 700111 HCHG RX REV CODE 636 W/ 250 OVERRIDE (IP): Performed by: HOSPITALIST

## 2017-12-29 PROCEDURE — 36415 COLL VENOUS BLD VENIPUNCTURE: CPT

## 2017-12-29 PROCEDURE — 85025 COMPLETE CBC W/AUTO DIFF WBC: CPT

## 2017-12-29 PROCEDURE — G0378 HOSPITAL OBSERVATION PER HR: HCPCS

## 2017-12-29 PROCEDURE — 99217 PR OBSERVATION CARE DISCHARGE: CPT | Performed by: HOSPITALIST

## 2017-12-29 PROCEDURE — 80053 COMPREHEN METABOLIC PANEL: CPT

## 2017-12-29 PROCEDURE — 96372 THER/PROPH/DIAG INJ SC/IM: CPT | Mod: XU

## 2017-12-29 RX ORDER — REGADENOSON 0.08 MG/ML
INJECTION, SOLUTION INTRAVENOUS
Status: COMPLETED
Start: 2017-12-29 | End: 2017-12-29

## 2017-12-29 RX ADMIN — LEVOTHYROXINE SODIUM 75 MCG: 75 TABLET ORAL at 06:19

## 2017-12-29 RX ADMIN — REGADENOSON 0.4 MG: 0.08 INJECTION, SOLUTION INTRAVENOUS at 08:21

## 2017-12-29 RX ADMIN — HEPARIN SODIUM 5000 UNITS: 5000 INJECTION, SOLUTION INTRAVENOUS; SUBCUTANEOUS at 06:18

## 2017-12-29 ASSESSMENT — PAIN SCALES - GENERAL
PAINLEVEL_OUTOF10: 0

## 2017-12-29 NOTE — PROGRESS NOTES
Assessment per flow chart. Pt on tele monitor,SR with BBB noted. Pt A&Ox4, denies pain, dizziness.  Right A/C IV site- c,d,i.  Pt's SpO2-96% on R/A.  Discussed POC with pt, pt verbalized understanding and agreement.  Pt instructed to call for assistance, pt's belonging's and call light within reach. Will continue to monitor.

## 2017-12-29 NOTE — PROGRESS NOTES
Report received, assumed patient care.  Pt A&OX4.  Family at bedside.  Assessment completed.  Call light within reach, personal belongings available, bed in lowest position, pt calling for assistance.  Pt reports no chest pain.  Communication board updated, POC discussed.  Monitors reapplied, VSS.  No additional needs at this time.

## 2017-12-29 NOTE — DISCHARGE INSTRUCTIONS
Discharge Instructions    Discharged to home by car with relative. Discharged via walking, hospital escort: Refused.  Special equipment needed: Not Applicable    Be sure to schedule a follow-up appointment with your primary care doctor or any specialists as instructed.     Discharge Plan:   Diet Plan: Discussed (Regular)  Activity Level: Discussed (As tolerated)  Confirmed Follow up Appointment: Patient to Call and Schedule Appointment  Confirmed Symptoms Management: Discussed  Medication Reconciliation Updated: Yes  Influenza Vaccine Indication: Not indicated: Previously immunized this influenza season and > 8 years of age    I understand that a diet low in cholesterol, fat, and sodium is recommended for good health. Unless I have been given specific instructions below for another diet, I accept this instruction as my diet prescription.   Other diet: Regular    Special Instructions: None    · Is patient discharged on Warfarin / Coumadin?   No     · Is patient Post Blood Transfusion?  No    Depression / Suicide Risk    As you are discharged from this [facility] facility, it is important to learn how to keep safe from harming yourself.    Recognize the warning signs:  · Abrupt changes in personality, positive or negative- including increase in energy   · Giving away possessions  · Change in eating patterns- significant weight changes-  positive or negative  · Change in sleeping patterns- unable to sleep or sleeping all the time   · Unwillingness or inability to communicate  · Depression  · Unusual sadness, discouragement and loneliness  · Talk of wanting to die  · Neglect of personal appearance   · Rebelliousness- reckless behavior  · Withdrawal from people/activities they love  · Confusion- inability to concentrate     If you or a loved one observes any of these behaviors or has concerns about self-harm, here's what you can do:  · Talk about it- your feelings and reasons for harming yourself  · Remove any means  that you might use to hurt yourself (examples: pills, rope, extension cords, firearm)  · Get professional help from the community (Mental Health, Substance Abuse, psychological counseling)  · Do not be alone:Call your Safe Contact- someone whom you trust who will be there for you.  · Call your local CRISIS HOTLINE 601-9529 or 499-375-9355  · Call your local Children's Mobile Crisis Response Team [place] (519) 140-8774 or www.Fatwire  · Call the toll free National Suicide Prevention Hotlines   · National Suicide Prevention Lifeline 837-856-ZBNV (4657)  · National Hope Line Network 800-SUICIDE (977-5205)

## 2017-12-29 NOTE — PROGRESS NOTES
Pt resting calmly, with eyes closed, is stable with arousal. Denies pain or needs at this time. Call light and personal possessions within reach, will continue to monitor.

## 2017-12-29 NOTE — PROGRESS NOTES
Pt discharged to home. IV d/c'd, pt armband removed. Pt understands written and verbal d/c instructions.  No new rx's.  Regular diet, activity as tolerated.  Pt to follow up with PCP.  Pt verbalized understanding.

## 2017-12-29 NOTE — PROGRESS NOTES
Received report from evening RN.  Pt is A/Ox4, respirations are even and unlabored on RA.  Pt denies any CP or SOB at this time.  POC reviewed, verbalized understanding.  Will continue to closely monitor. Call light within reach.

## 2018-01-08 ENCOUNTER — HOSPITAL ENCOUNTER (OUTPATIENT)
Facility: MEDICAL CENTER | Age: 73
End: 2018-01-08
Attending: DERMATOLOGY
Payer: MEDICARE

## 2018-01-08 ENCOUNTER — OFFICE VISIT (OUTPATIENT)
Dept: DERMATOLOGY | Facility: IMAGING CENTER | Age: 73
End: 2018-01-08
Payer: MEDICARE

## 2018-01-08 DIAGNOSIS — D48.5 NEOPLASM OF UNCERTAIN BEHAVIOR OF SKIN: ICD-10-CM

## 2018-01-08 DIAGNOSIS — Z85.828 HISTORY OF BASAL CELL CARCINOMA: ICD-10-CM

## 2018-01-08 PROCEDURE — 11100 PR BIOPSY OF SKIN LESION: CPT | Performed by: DERMATOLOGY

## 2018-01-08 PROCEDURE — 88305 TISSUE EXAM BY PATHOLOGIST: CPT

## 2018-01-08 ASSESSMENT — ENCOUNTER SYMPTOMS
CHILLS: 0
FEVER: 0

## 2018-01-08 NOTE — PROGRESS NOTES
"Dermatology Return Patient Visit    Chief Complaint   Patient presents with   • Skin Lesion       Subjective:     HPI:   Myke Steele is a 72 y.o. male presenting for    Cyst on back of neck , s/p removal 12/13/17  Well-healed    Spot on the left lower cutaneous lip  Present x years  Slight increase in size, cuts it when shaving  No treatments    History of skin cancer: yes, BCC 11/13/17 - superficial, completely removed  History of blistering/severe sunburns:No  Family history of skin cancer:No  Family history of atypical moles:No        Past Medical History:   Diagnosis Date   • Anesthesia 04/07/15    Has never had general anesthesia   • Benign localized hyperplasia of prostate with urinary obstruction and other lower urinary tract symptoms (LUTS)(600.21) 8/9/2012   • Benign prostatic hypertrophy    • Coronary arteriosclerosis due to lipid rich plaque    • Coronary atherosclerosis of native coronary artery 8/9/2012   • Essential hypertriglyceridemia 8/9/2012   • High cholesterol    • Hypercholesterolemia 8/9/2012   • Hypertension    • Lumbar spinal stenosis    • Obesity 8/28/2014   • Pain 04/07/15    Bilateral lower back, radiating down buttocks=\"aches\" worse with activity   • Stented coronary artery 8/9/2012   • Unspecified cataract     R IOL   • Unspecified disorder of thyroid        Current Outpatient Prescriptions on File Prior to Visit   Medication Sig Dispense Refill   • FEXOFENADINE HCL PO Take  by mouth.     • Flaxseed, Linseed, (FLAX SEEDS PO) Take  by mouth.     • finasteride (PROSCAR) 5 MG Tab Take 5 mg by mouth every day.     • azithromycin (ZITHROMAX) 250 MG Tab Take tablets by mouth as directed. (Patient not taking: Reported on 11/15/2017) 6 Tab 1   • amlodipine (NORVASC) 5 MG Tab TAKE 1 TAB BY MOUTH EVERY DAY. 90 Tab 3   • amlodipine (NORVASC) 5 MG Tab Take 1 Tab by mouth every day. 90 Tab 3   • tadalafil (CIALIS) 5 MG tablet Take 1 Tab by mouth every day. 90 Tab 1   • carvedilol (COREG) " 12.5 MG Tab Take 1 Tab by mouth 2 times a day, with meals. 180 Tab 3   • lisinopril (PRINIVIL, ZESTRIL) 40 MG tablet Take 1 Tab by mouth every day. 90 Tab 3   • levothyroxine (SYNTHROID) 75 MCG Tab TAKE 1 TAB BY MOUTH EVERY DAY. 90 Tab 3   • Docusate Sodium (DSS) 100 MG Cap Take 100 mg by mouth 2 Times a Day. (Patient not taking: Reported on 11/15/2017) 180 Cap 1   • Non Formulary Request Alpha linolenic acid 550mg     • Coenzyme Q10 (CO Q-10) 100 MG Cap Take 200 mg by mouth every day.     • Alpha-Lipoic Acid 600 MG Cap Take 600 mg by mouth every day.     • Magnesium 250 MG Tab Take 250 mg by mouth every day.     • LevOCARNitine (CARNITINE, L,) Powder 3 g by Does not apply route every day.     • aspirin (ASA) 81 MG Chew Tab chewable tablet Take 243 mg by mouth every day. 100 Tab 11   • Cyanocobalamin (VITAMIN B-12) 5000 MCG SL Tab Place  under tongue.       No current facility-administered medications on file prior to visit.        No Known Allergies    Family History   Problem Relation Age of Onset   • Heart Failure Father    • Heart Disease Father      valvular   • Hyperlipidemia Brother        Social History     Social History   • Marital status:      Spouse name: N/A   • Number of children: N/A   • Years of education: N/A     Occupational History   • Not on file.     Social History Main Topics   • Smoking status: Never Smoker   • Smokeless tobacco: Never Used   • Alcohol use Yes      Comment: 2-3/week   • Drug use: No   • Sexual activity: Not on file      Comment: , 2 daughters, retired      Other Topics Concern   • Not on file     Social History Narrative   • No narrative on file       Review of Systems   Constitutional: Negative for chills and fever.   Skin: Negative for itching and rash.   All other systems reviewed and are negative.       Objective:     A focused cutaneous exam was completed including: hair, face, eyelids, conjunctiva, lips, neck, left and right upper extremities (including  hands/digits and fingernails) with the following pertinent findings listed below. Remaining above-listed examined areas within normal limits / negative for rashes or lesions.    Physical Exam   Constitutional: He is oriented to person, place, and time and well-developed, well-nourished, and in no distress.   HENT:   Head: Normocephalic and atraumatic.       Right Ear: External ear normal.   Left Ear: External ear normal.   Nose: Nose normal.   Mouth/Throat: No oropharyngeal exudate.   Eyes: Conjunctivae and lids are normal.   Neck: Normal range of motion.   Pulmonary/Chest: Effort normal.   Neurological: He is alert and oriented to person, place, and time.   Skin: Skin is warm and dry.        Psychiatric: Mood and affect normal.       DATA: none applicable to review    Assessment and Plan:     1. Neoplasm of uncertain behavior of skin - left oral commisure  Procedure Note   Procedure: Biopsy by shave technique  Location: as noted above  Size: as noted in exam  Preoperative diagnosis: BCC vs fibrous papule vs other  Risks, benefits and alternatives of procedure discussed and written informed consent obtained. Time out completed. Area of biopsy prepped with alcohol. Anesthesia with 1% lidocaine with epinephrine administered with 30 gauge needle. Shave biopsy of the site performed. Hemostasis achieved with pressure and aluminum chloride. Vaseline applied to wound with bandage. Patient tolerated procedure well and there were no complications. The specimen was sent to the pathology lab by the staff. Wound care was discussed.    2. History of basal cell carcinoma  - Skin cancer education  - discussed importance of sun protective clothing, eyewear  - discussed importance of daily use of broad spectrum sunscreen with SPF 30 or greater, as well as need for reapplication ~every 2 hours when exposed to UVR  - discussed importance of regular self-exams, ideally once per month, annual exams (recommend ever 3-4 months after  recent BCC dx/pending above results) in clinic  - ABCDE's of melanoma discussed  - patient to bring any new or concerning lesions to my attention    Followup: Return for further management pending results of bx, NILSON in ~3-4 months.    Lexy Monson M.D.

## 2018-01-10 ENCOUNTER — TELEPHONE (OUTPATIENT)
Dept: DERMATOLOGY | Facility: IMAGING CENTER | Age: 73
End: 2018-01-10

## 2018-01-29 ENCOUNTER — PATIENT MESSAGE (OUTPATIENT)
Dept: INTERNAL MEDICINE | Facility: IMAGING CENTER | Age: 73
End: 2018-01-29

## 2018-01-30 RX ORDER — TADALAFIL 5 MG/1
5 TABLET ORAL DAILY
Qty: 90 TAB | Refills: 1 | Status: SHIPPED
Start: 2018-01-30 | End: 2018-07-31 | Stop reason: SDUPTHER

## 2018-01-30 NOTE — TELEPHONE ENCOUNTER
From: Myke Steele  To: Shelly George M.D.  Sent: 1/29/2018 11:39 AM PST  Subject: Prescription Question    Hi Dr George   I think I need a new prescription sent to [facility]   for Cialis   Can you fax or email to the following:    Order Number:[id number]  Name on Rx:Myke Steele   Address:[address] [place] [address]  Phone [phone]    If you ordered a Prescription Medication, please send us your prescription.     Upload You can upload your prescription by click Upload Rx   Email Please scan or take a photo of your prescription and attach to an email.    [email or web address]   Fax Toll-Free Fax: [phone]     Thanks  Myke

## 2018-02-03 ENCOUNTER — PATIENT MESSAGE (OUTPATIENT)
Dept: INTERNAL MEDICINE | Facility: IMAGING CENTER | Age: 73
End: 2018-02-03

## 2018-02-03 DIAGNOSIS — K59.00 CONSTIPATION, UNSPECIFIED CONSTIPATION TYPE: ICD-10-CM

## 2018-02-05 RX ORDER — PSEUDOEPHEDRINE HCL 30 MG
100 TABLET ORAL 2 TIMES DAILY
Qty: 180 CAP | Refills: 1 | Status: SHIPPED | OUTPATIENT
Start: 2018-02-05 | End: 2018-10-22 | Stop reason: SDUPTHER

## 2018-02-05 NOTE — TELEPHONE ENCOUNTER
From: Myke Steele  To: Shelly George M.D.  Sent: 2/3/2018 10:27 AM PST  Subject: Prescription Question    Hi Dr George   I'm out of refills for the following:    docusate sodium 100 MG Caps  Take 100 mg by mouth 2 times a day.  Shelly George M.D.[id number] Cap    Can you please have someone call in a refill for  me?    Thanks  Myke

## 2018-03-18 DIAGNOSIS — I10 ESSENTIAL HYPERTENSION: ICD-10-CM

## 2018-03-19 RX ORDER — LISINOPRIL 40 MG/1
40 TABLET ORAL DAILY
Qty: 90 TAB | Refills: 3 | Status: SHIPPED | OUTPATIENT
Start: 2018-03-19 | End: 2019-03-15 | Stop reason: SDUPTHER

## 2018-04-27 ENCOUNTER — HOSPITAL ENCOUNTER (OUTPATIENT)
Dept: LAB | Facility: MEDICAL CENTER | Age: 73
End: 2018-04-27
Attending: UROLOGY
Payer: MEDICARE

## 2018-04-27 LAB — PSA SERPL-MCNC: 2.11 NG/ML (ref 0–4)

## 2018-04-27 PROCEDURE — 84153 ASSAY OF PSA TOTAL: CPT

## 2018-04-27 PROCEDURE — 36415 COLL VENOUS BLD VENIPUNCTURE: CPT

## 2018-05-21 RX ORDER — LEVOTHYROXINE SODIUM 0.07 MG/1
TABLET ORAL
Qty: 90 TAB | Refills: 3 | Status: SHIPPED | OUTPATIENT
Start: 2018-05-21 | End: 2019-05-24 | Stop reason: SDUPTHER

## 2018-06-08 DIAGNOSIS — I10 ESSENTIAL HYPERTENSION: ICD-10-CM

## 2018-06-08 RX ORDER — CARVEDILOL 12.5 MG/1
12.5 TABLET ORAL 2 TIMES DAILY WITH MEALS
Qty: 60 TAB | Refills: 0 | Status: SHIPPED | OUTPATIENT
Start: 2018-06-08 | End: 2018-07-10 | Stop reason: SDUPTHER

## 2018-07-09 NOTE — TELEPHONE ENCOUNTER
"Patient sent a message via [employer] stating that he had Chest pain.  I called him and he told me that he had chest pain over his left chest and his BP was high systolic of 180s.  He stated that he \"chewed a couple of aspirin\" and the pain was better in his chest.  He was instructed to go to the ER to be checked out.  " Statement Selected

## 2018-07-10 ENCOUNTER — PATIENT MESSAGE (OUTPATIENT)
Dept: INTERNAL MEDICINE | Facility: IMAGING CENTER | Age: 73
End: 2018-07-10

## 2018-07-10 DIAGNOSIS — I10 ESSENTIAL HYPERTENSION: ICD-10-CM

## 2018-07-10 RX ORDER — CARVEDILOL 12.5 MG/1
12.5 TABLET ORAL 2 TIMES DAILY WITH MEALS
Qty: 180 TAB | Refills: 3 | Status: SHIPPED | OUTPATIENT
Start: 2018-07-10 | End: 2019-07-06 | Stop reason: SDUPTHER

## 2018-07-10 NOTE — TELEPHONE ENCOUNTER
From: Myke Steele  To: Shelly George M.D.  Sent: 7/10/2018 8:31 AM PDT  Subject: Prescription Question    Hi Dr George,    Can you call in a prescription renewal for  carvedilol 12.5 MG Tabs  Commonly known as: COREG  Take 1 tab by mouth 2 times a day, with meals.    I normally get a 90 day supply.    Thanks  Myke

## 2018-07-24 ENCOUNTER — OFFICE VISIT (OUTPATIENT)
Dept: DERMATOLOGY | Facility: IMAGING CENTER | Age: 73
End: 2018-07-24
Payer: MEDICARE

## 2018-07-24 VITALS — WEIGHT: 265 LBS | BODY MASS INDEX: 37.1 KG/M2 | TEMPERATURE: 97.4 F | HEIGHT: 71 IN

## 2018-07-24 DIAGNOSIS — D22.9 MULTIPLE NEVI: ICD-10-CM

## 2018-07-24 DIAGNOSIS — L82.1 SEBORRHEIC KERATOSES: ICD-10-CM

## 2018-07-24 DIAGNOSIS — L57.0 ACTINIC KERATOSES: ICD-10-CM

## 2018-07-24 DIAGNOSIS — C44.619 BASAL CELL CARCINOMA (BCC) OF SKIN OF LEFT UPPER EXTREMITY INCLUDING SHOULDER: ICD-10-CM

## 2018-07-24 PROCEDURE — 99213 OFFICE O/P EST LOW 20 MIN: CPT | Mod: 25 | Performed by: DERMATOLOGY

## 2018-07-24 PROCEDURE — 17000 DESTRUCT PREMALG LESION: CPT | Performed by: DERMATOLOGY

## 2018-07-24 RX ORDER — FLUOROURACIL 50 MG/G
CREAM TOPICAL
Qty: 1 TUBE | Refills: 0 | Status: SHIPPED | OUTPATIENT
Start: 2018-07-24 | End: 2018-08-31

## 2018-07-24 ASSESSMENT — ENCOUNTER SYMPTOMS
CHILLS: 0
FEVER: 0

## 2018-07-24 NOTE — PROGRESS NOTES
"Dermatology Return Patient Visit    Chief Complaint   Patient presents with   • Follow-Up     NILSON       Subjective:     HPI:   Myke Steele is a 72 y.o. male presenting for    Full skin exam, no area of concern today. Personal history of BCC - left forearm  States has several moles on the back that his PT told him to have checked  Unsure of which ones, no known changes    History of skin cancer: yes, BCC 11/13/17 - superficial  History of blistering/severe sunburns:No  Family history of skin cancer:No  Family history of atypical moles:No        Past Medical History:   Diagnosis Date   • Anesthesia 04/07/15    Has never had general anesthesia   • Benign localized hyperplasia of prostate with urinary obstruction and other lower urinary tract symptoms (LUTS)(600.21) 8/9/2012   • Benign prostatic hypertrophy    • Coronary arteriosclerosis due to lipid rich plaque    • Coronary atherosclerosis of native coronary artery 8/9/2012   • Essential hypertriglyceridemia 8/9/2012   • High cholesterol    • Hypercholesterolemia 8/9/2012   • Hypertension    • Lumbar spinal stenosis    • Obesity 8/28/2014   • Pain 04/07/15    Bilateral lower back, radiating down buttocks=\"aches\" worse with activity   • Stented coronary artery 8/9/2012   • Unspecified cataract     R IOL   • Unspecified disorder of thyroid        Current Outpatient Prescriptions on File Prior to Visit   Medication Sig Dispense Refill   • carvedilol (COREG) 12.5 MG Tab Take 1 Tab by mouth 2 times a day, with meals. 180 Tab 3   • levothyroxine (SYNTHROID) 75 MCG Tab TAKE 1 TAB BY MOUTH EVERY DAY. 90 Tab 3   • lisinopril (PRINIVIL, ZESTRIL) 40 MG tablet TAKE 1 TAB BY MOUTH EVERY DAY. 90 Tab 3   • docusate sodium 100 MG Cap Take 100 mg by mouth 2 Times a Day. 180 Cap 1   • tadalafil (CIALIS) 5 MG tablet Take 1 Tab by mouth every day. 90 Tab 1   • FEXOFENADINE HCL PO Take  by mouth.     • Flaxseed, Linseed, (FLAX SEEDS PO) Take  by mouth.     • finasteride " (PROSCAR) 5 MG Tab Take 5 mg by mouth every day.     • azithromycin (ZITHROMAX) 250 MG Tab Take tablets by mouth as directed. (Patient not taking: Reported on 11/15/2017) 6 Tab 1   • amlodipine (NORVASC) 5 MG Tab TAKE 1 TAB BY MOUTH EVERY DAY. 90 Tab 3   • amlodipine (NORVASC) 5 MG Tab Take 1 Tab by mouth every day. 90 Tab 3   • Non Formulary Request Alpha linolenic acid 550mg     • Coenzyme Q10 (CO Q-10) 100 MG Cap Take 200 mg by mouth every day.     • Alpha-Lipoic Acid 600 MG Cap Take 600 mg by mouth every day.     • Magnesium 250 MG Tab Take 250 mg by mouth every day.     • LevOCARNitine (CARNITINE, L,) Powder 3 g by Does not apply route every day.     • aspirin (ASA) 81 MG Chew Tab chewable tablet Take 243 mg by mouth every day. 100 Tab 11   • Cyanocobalamin (VITAMIN B-12) 5000 MCG SL Tab Place  under tongue.       No current facility-administered medications on file prior to visit.        No Known Allergies    Family History   Problem Relation Age of Onset   • Heart Failure Father    • Heart Disease Father      valvular   • Hyperlipidemia Brother        Social History     Social History   • Marital status:      Spouse name: N/A   • Number of children: N/A   • Years of education: N/A     Occupational History   • Not on file.     Social History Main Topics   • Smoking status: Never Smoker   • Smokeless tobacco: Never Used   • Alcohol use Yes      Comment: 2-3/week   • Drug use: No   • Sexual activity: Not on file      Comment: , 2 daughters, retired      Other Topics Concern   • Not on file     Social History Narrative   • No narrative on file       Review of Systems   Constitutional: Negative for chills and fever.   Skin: Negative for itching and rash.   All other systems reviewed and are negative.       Objective:     A full mucocutaneous exam was completed including: scalp, hair, ears, face, eyelids, conjunctiva, lips, gums/tongue/oropharynx, neck, chest, abdomen, back, left and right upper  extremities (including hands/digits and fingernails), left and right lower extremities (including feet/toes, toenails), buttocks, including external genitalia with the following pertinent findings listed below. Remaining above-listed examined areas within normal limits / negative for rashes or lesions.      Physical Exam   Constitutional: He is oriented to person, place, and time and well-developed, well-nourished, and in no distress.   HENT:   Head: Normocephalic and atraumatic.       Right Ear: External ear normal.   Left Ear: External ear normal.   Nose: Nose normal.   Eyes: Conjunctivae and lids are normal.   Neck: Normal range of motion.   Pulmonary/Chest: Effort normal.   Neurological: He is alert and oriented to person, place, and time.   Skin: Skin is warm and dry.        Psychiatric: Mood and affect normal.       DATA: none applicable to review    Assessment and Plan:     1. Actinic keratosis  CRYOTHERAPY:  Risks (including, but not limited to: hypo or hyperpigmentation, redness, blister, blood blister, recurrence, need for further treatment, infection, scar) and benefits of cryotherapy discussed. Patient verbally agreed to proceed with treatment. 2 cryotherapy freeze thaw cycles of 10 seconds were applied to 1 lesion on the left temple with cryac. Patient tolerated procedure well. Aftercare instructions given.    2. Basal cell carcinoma (BCC) of skin of left upper extremity including shoulder  - will treat with efudex for any residual  - patient to start Efudex 5% cream to the AA of the arm BID x 4-6 weeks (pending response). AVOID eye area. Side effects of cream (significant irritation) discussed.   - Patient instructed to protect the skin from the sun.  - Aquaphor for itching/small open sores  - Will check on patient in 4 weeks to see how he is doing; he was instructed to call me at any point if he has any concerns about proceeding with treatment    3. Seborrheic keratoses  - Benign-appearing nature of  lesions discussed. Advised to return to clinic for any new or concerning changes.    4. Multiple nevi  - Benign-appearing nature of lesions discussed. Advised to return to clinic for any new or concerning changes.  - ABCDE's of melanoma discussed    Followup: Return in about 4 weeks (around 8/21/2018).    Lexy Monson M.D.

## 2018-07-31 ENCOUNTER — PATIENT MESSAGE (OUTPATIENT)
Dept: INTERNAL MEDICINE | Facility: IMAGING CENTER | Age: 73
End: 2018-07-31

## 2018-07-31 DIAGNOSIS — R73.09 ELEVATED GLUCOSE: ICD-10-CM

## 2018-07-31 DIAGNOSIS — R73.03 PREDIABETES: ICD-10-CM

## 2018-07-31 RX ORDER — TADALAFIL 5 MG/1
5 TABLET ORAL DAILY
Qty: 90 TAB | Refills: 1 | Status: SHIPPED
Start: 2018-07-31 | End: 2019-02-01 | Stop reason: SDUPTHER

## 2018-08-20 ENCOUNTER — OFFICE VISIT (OUTPATIENT)
Dept: DERMATOLOGY | Facility: IMAGING CENTER | Age: 73
End: 2018-08-20
Payer: MEDICARE

## 2018-08-20 DIAGNOSIS — L30.8 OTHER ECZEMA: ICD-10-CM

## 2018-08-20 DIAGNOSIS — C44.619 BASAL CELL CARCINOMA (BCC) OF SKIN OF LEFT UPPER EXTREMITY INCLUDING SHOULDER: ICD-10-CM

## 2018-08-20 PROCEDURE — 99213 OFFICE O/P EST LOW 20 MIN: CPT | Performed by: DERMATOLOGY

## 2018-08-20 RX ORDER — TRIAMCINOLONE ACETONIDE 1 MG/G
1 CREAM TOPICAL 2 TIMES DAILY
Qty: 45 G | Refills: 2 | Status: SHIPPED | OUTPATIENT
Start: 2018-08-20 | End: 2023-01-24

## 2018-08-20 ASSESSMENT — ENCOUNTER SYMPTOMS
FEVER: 0
CHILLS: 0

## 2018-08-20 NOTE — PROGRESS NOTES
Dermatology Return Patient Visit    Chief Complaint   Patient presents with   • Other     Follow up medication        Subjective:     HPI:   Myke Steele is a 72 y.o. male presenting for    Follow up efudex treatment for any residual superficial/multifocal BCC on the left arm  Minimal reaction, has been using x 4 weeks BID    Spot on left temple well-healed    Rash on the right foot  Worsened over last few weeks  Minimal itching  Denies any increased walking/activity  Treating with OTC lotion    History of skin cancer: yes, BCC 11/13/17 - superficial multifocal  History of blistering/severe sunburns:No  Family history of skin cancer:No  Family history of atypical moles:No        Current Outpatient Prescriptions on File Prior to Visit   Medication Sig Dispense Refill   • tadalafil (CIALIS) 5 MG tablet Take 1 Tab by mouth every day. 90 Tab 1   • fluorouracil (EFUDEX) 5 % cream Twice daily to the area on the arm for 4-6 weeks pending response 1 Tube 0   • carvedilol (COREG) 12.5 MG Tab Take 1 Tab by mouth 2 times a day, with meals. 180 Tab 3   • levothyroxine (SYNTHROID) 75 MCG Tab TAKE 1 TAB BY MOUTH EVERY DAY. 90 Tab 3   • lisinopril (PRINIVIL, ZESTRIL) 40 MG tablet TAKE 1 TAB BY MOUTH EVERY DAY. 90 Tab 3   • docusate sodium 100 MG Cap Take 100 mg by mouth 2 Times a Day. 180 Cap 1   • FEXOFENADINE HCL PO Take  by mouth.     • Flaxseed, Linseed, (FLAX SEEDS PO) Take  by mouth.     • finasteride (PROSCAR) 5 MG Tab Take 5 mg by mouth every day.     • azithromycin (ZITHROMAX) 250 MG Tab Take tablets by mouth as directed. (Patient not taking: Reported on 11/15/2017) 6 Tab 1   • amlodipine (NORVASC) 5 MG Tab TAKE 1 TAB BY MOUTH EVERY DAY. 90 Tab 3   • amlodipine (NORVASC) 5 MG Tab Take 1 Tab by mouth every day. 90 Tab 3   • Non Formulary Request Alpha linolenic acid 550mg     • Coenzyme Q10 (CO Q-10) 100 MG Cap Take 200 mg by mouth every day.     • Alpha-Lipoic Acid 600 MG Cap Take 600 mg by mouth every day.      • Magnesium 250 MG Tab Take 250 mg by mouth every day.     • LevOCARNitine (CARNITINE, L,) Powder 3 g by Does not apply route every day.     • aspirin (ASA) 81 MG Chew Tab chewable tablet Take 243 mg by mouth every day. 100 Tab 11   • Cyanocobalamin (VITAMIN B-12) 5000 MCG SL Tab Place  under tongue.       No current facility-administered medications on file prior to visit.        No Known Allergies     Review of Systems   Constitutional: Negative for chills and fever.   Skin: Positive for rash. Negative for itching.   All other systems reviewed and are negative.       Objective:     A focused cutaneous exam was completed including:face, eyelids, conjunctiva, lips, neck, left and right upper extremities (including hands/digits and fingernails), left and right lower extremities (including feet/toes, toenails) with the following pertinent findings listed below. Remaining above-listed examined areas within normal limits / negative for rashes or lesions.      Physical Exam   Constitutional: He is oriented to person, place, and time and well-developed, well-nourished, and in no distress.   HENT:   Head: Normocephalic and atraumatic.       Right Ear: External ear normal.   Left Ear: External ear normal.   Eyes: Conjunctivae and lids are normal.   Neck: Normal range of motion.   Pulmonary/Chest: Effort normal.   Neurological: He is alert and oriented to person, place, and time.   Skin: Skin is warm and dry.        Psychiatric: Mood and affect normal.       DATA: none applicable to review    Assessment and Plan:     1. Basal cell carcinoma (BCC) of skin of left upper extremity including shoulder  - continue Efudex x 2 more weeks, then stop - likely no residual tumor given minimal response  - discussed importance of regular sun protection/sunscreen use, SPF 30 or greater with broad spectrum coverage, need for reapplication every  minutes  - NILSON in 4-6 months    2. Other eczema  - educated patient about diagnosis,  management options, and expectations of treatment  - start triamcinolone 0.1% ointment to affected area of the body twice daily. Patient instructed to avoid face, axilla, and groin area. Side effects discussed, including skin thinning, appearance of stretch marks (striae), easy bruising, telangiectasias, and possible increased hair growth   - urea 20% cream daily  - emphasized importance of daily compression and elevation of the legs whenever able to do so      Followup: Return for NILSON December 2018.    Lexy Monson M.D.

## 2018-08-22 ENCOUNTER — NON-PROVIDER VISIT (OUTPATIENT)
Dept: INTERNAL MEDICINE | Facility: IMAGING CENTER | Age: 73
End: 2018-08-22
Payer: MEDICARE

## 2018-08-22 ENCOUNTER — HOSPITAL ENCOUNTER (OUTPATIENT)
Facility: MEDICAL CENTER | Age: 73
End: 2018-08-22
Attending: FAMILY MEDICINE
Payer: MEDICARE

## 2018-08-22 DIAGNOSIS — R73.09 ELEVATED GLUCOSE: ICD-10-CM

## 2018-08-22 DIAGNOSIS — R73.03 PREDIABETES: ICD-10-CM

## 2018-08-22 LAB
ALBUMIN SERPL BCP-MCNC: 4.2 G/DL (ref 3.2–4.9)
ALBUMIN/GLOB SERPL: 1.9 G/DL
ALP SERPL-CCNC: 65 U/L (ref 30–99)
ALT SERPL-CCNC: 40 U/L (ref 2–50)
ANION GAP SERPL CALC-SCNC: 9 MMOL/L (ref 0–11.9)
AST SERPL-CCNC: 26 U/L (ref 12–45)
BILIRUB SERPL-MCNC: 0.7 MG/DL (ref 0.1–1.5)
BUN SERPL-MCNC: 26 MG/DL (ref 8–22)
CALCIUM SERPL-MCNC: 9.7 MG/DL (ref 8.5–10.5)
CHLORIDE SERPL-SCNC: 107 MMOL/L (ref 96–112)
CO2 SERPL-SCNC: 23 MMOL/L (ref 20–33)
CREAT SERPL-MCNC: 0.9 MG/DL (ref 0.5–1.4)
EST. AVERAGE GLUCOSE BLD GHB EST-MCNC: 151 MG/DL
GLOBULIN SER CALC-MCNC: 2.2 G/DL (ref 1.9–3.5)
GLUCOSE SERPL-MCNC: 146 MG/DL (ref 65–99)
HBA1C MFR BLD: 6.9 % (ref 0–5.6)
POTASSIUM SERPL-SCNC: 4.4 MMOL/L (ref 3.6–5.5)
PROT SERPL-MCNC: 6.4 G/DL (ref 6–8.2)
SODIUM SERPL-SCNC: 139 MMOL/L (ref 135–145)

## 2018-08-22 PROCEDURE — 80053 COMPREHEN METABOLIC PANEL: CPT

## 2018-08-22 PROCEDURE — 83036 HEMOGLOBIN GLYCOSYLATED A1C: CPT

## 2018-08-31 ENCOUNTER — OFFICE VISIT (OUTPATIENT)
Dept: INTERNAL MEDICINE | Facility: IMAGING CENTER | Age: 73
End: 2018-08-31
Payer: MEDICARE

## 2018-08-31 VITALS
WEIGHT: 277 LBS | BODY MASS INDEX: 38.78 KG/M2 | OXYGEN SATURATION: 94 % | DIASTOLIC BLOOD PRESSURE: 84 MMHG | RESPIRATION RATE: 16 BRPM | HEIGHT: 71 IN | SYSTOLIC BLOOD PRESSURE: 120 MMHG | TEMPERATURE: 97.2 F | HEART RATE: 85 BPM

## 2018-08-31 DIAGNOSIS — Z00.00 MEDICARE ANNUAL WELLNESS VISIT, SUBSEQUENT: ICD-10-CM

## 2018-08-31 DIAGNOSIS — N13.8 BENIGN PROSTATIC HYPERPLASIA WITH URINARY OBSTRUCTION: Chronic | ICD-10-CM

## 2018-08-31 DIAGNOSIS — R19.4 BOWEL HABIT CHANGES: ICD-10-CM

## 2018-08-31 DIAGNOSIS — Z95.5 STENTED CORONARY ARTERY: ICD-10-CM

## 2018-08-31 DIAGNOSIS — E03.9 HYPOTHYROIDISM, UNSPECIFIED TYPE: Chronic | ICD-10-CM

## 2018-08-31 DIAGNOSIS — I10 ESSENTIAL HYPERTENSION: ICD-10-CM

## 2018-08-31 DIAGNOSIS — M51.36 DDD (DEGENERATIVE DISC DISEASE), LUMBAR: Chronic | ICD-10-CM

## 2018-08-31 DIAGNOSIS — E78.5 DYSLIPIDEMIA: ICD-10-CM

## 2018-08-31 DIAGNOSIS — N40.1 BENIGN PROSTATIC HYPERPLASIA WITH URINARY OBSTRUCTION: Chronic | ICD-10-CM

## 2018-08-31 DIAGNOSIS — I25.83 CORONARY ARTERIOSCLEROSIS DUE TO LIPID RICH PLAQUE: Chronic | ICD-10-CM

## 2018-08-31 DIAGNOSIS — E11.9 TYPE 2 DIABETES MELLITUS WITHOUT COMPLICATION, WITHOUT LONG-TERM CURRENT USE OF INSULIN (HCC): ICD-10-CM

## 2018-08-31 DIAGNOSIS — I25.10 CORONARY ARTERIOSCLEROSIS DUE TO LIPID RICH PLAQUE: Chronic | ICD-10-CM

## 2018-08-31 PROCEDURE — G0438 PPPS, INITIAL VISIT: HCPCS | Performed by: FAMILY MEDICINE

## 2018-08-31 ASSESSMENT — PATIENT HEALTH QUESTIONNAIRE - PHQ9: CLINICAL INTERPRETATION OF PHQ2 SCORE: 0

## 2018-08-31 ASSESSMENT — ENCOUNTER SYMPTOMS: GENERAL WELL-BEING: GOOD

## 2018-08-31 ASSESSMENT — ACTIVITIES OF DAILY LIVING (ADL): BATHING_REQUIRES_ASSISTANCE: 0

## 2018-09-04 ENCOUNTER — PATIENT MESSAGE (OUTPATIENT)
Dept: INTERNAL MEDICINE | Facility: IMAGING CENTER | Age: 73
End: 2018-09-04

## 2018-09-04 RX ORDER — AZITHROMYCIN 250 MG/1
TABLET, FILM COATED ORAL
Qty: 6 TAB | Refills: 0 | Status: SHIPPED | OUTPATIENT
Start: 2018-09-04 | End: 2019-02-14

## 2018-09-04 NOTE — TELEPHONE ENCOUNTER
From: Myke Steele  To: Shelly George M.D.  Sent: 9/4/2018 8:56 AM PDT  Subject: Non-Urgent Medical Question    Hi Dr Sauceda  Since I saw you I have developed a bad head cold.  Running nose, congestion, ough, stuffiness, sore throat etc  Is there anything I can take to get rid of i?  Leaving Thursday AM for vacation    Myke

## 2018-09-19 ENCOUNTER — PATIENT MESSAGE (OUTPATIENT)
Dept: INTERNAL MEDICINE | Facility: IMAGING CENTER | Age: 73
End: 2018-09-19

## 2018-09-19 ENCOUNTER — HOSPITAL ENCOUNTER (OUTPATIENT)
Dept: RADIOLOGY | Facility: MEDICAL CENTER | Age: 73
End: 2018-09-19
Attending: FAMILY MEDICINE
Payer: MEDICARE

## 2018-09-19 DIAGNOSIS — G89.29 CHRONIC PAIN OF RIGHT KNEE: ICD-10-CM

## 2018-09-19 DIAGNOSIS — E11.9 TYPE 2 DIABETES MELLITUS WITHOUT COMPLICATION, WITHOUT LONG-TERM CURRENT USE OF INSULIN (HCC): ICD-10-CM

## 2018-09-19 DIAGNOSIS — M25.561 CHRONIC PAIN OF RIGHT KNEE: ICD-10-CM

## 2018-09-19 PROCEDURE — 73564 X-RAY EXAM KNEE 4 OR MORE: CPT | Mod: RT

## 2018-09-19 RX ORDER — METFORMIN HYDROCHLORIDE 500 MG/1
1000 TABLET, EXTENDED RELEASE ORAL DAILY
Qty: 180 TAB | Refills: 3 | Status: SHIPPED | OUTPATIENT
Start: 2018-09-19 | End: 2019-09-09 | Stop reason: SDUPTHER

## 2018-09-19 NOTE — TELEPHONE ENCOUNTER
From: Myke Steele  To: Shelly George M.D.  Sent: 9/19/2018 8:51 AM PDT  Subject: Non-Urgent Medical Question    Hi Dr George   We’re back from vacation and had a great time  You said that when I got back you wanted to start me   on Metforman. Will you call in prescription for it?  Also went to physical therapy and no improvement in knee  pain. She thinks I should get X-ray and/or scan given no  No change in knee status. Please advise / schedule     Thanks  Myke

## 2018-09-20 ENCOUNTER — TELEPHONE (OUTPATIENT)
Dept: INTERNAL MEDICINE | Facility: IMAGING CENTER | Age: 73
End: 2018-09-20

## 2018-09-20 DIAGNOSIS — M25.561 CHRONIC PAIN OF RIGHT KNEE: ICD-10-CM

## 2018-09-20 DIAGNOSIS — G89.29 CHRONIC PAIN OF RIGHT KNEE: ICD-10-CM

## 2018-09-20 NOTE — TELEPHONE ENCOUNTER
Discussed x-ray with Myke.  Shows mild osteoarthritis.  He is having quite a bit of medial knee pain.  Has been doing physical therapy.  Has been doing an anti-inflammatory and ice.  Recommend referral to Ortho and MRI.

## 2018-09-26 ENCOUNTER — HOSPITAL ENCOUNTER (OUTPATIENT)
Dept: RADIOLOGY | Facility: MEDICAL CENTER | Age: 73
End: 2018-09-26
Attending: FAMILY MEDICINE
Payer: MEDICARE

## 2018-09-26 DIAGNOSIS — G89.29 CHRONIC PAIN OF RIGHT KNEE: ICD-10-CM

## 2018-09-26 DIAGNOSIS — M25.561 CHRONIC PAIN OF RIGHT KNEE: ICD-10-CM

## 2018-09-26 PROCEDURE — 73721 MRI JNT OF LWR EXTRE W/O DYE: CPT | Mod: RT

## 2018-10-16 ENCOUNTER — PATIENT MESSAGE (OUTPATIENT)
Dept: INTERNAL MEDICINE | Facility: IMAGING CENTER | Age: 73
End: 2018-10-16

## 2018-10-16 DIAGNOSIS — I10 ESSENTIAL HYPERTENSION: ICD-10-CM

## 2018-10-16 RX ORDER — AMLODIPINE BESYLATE 5 MG/1
5 TABLET ORAL DAILY
Qty: 90 TAB | Refills: 3 | Status: SHIPPED | OUTPATIENT
Start: 2018-10-16 | End: 2020-01-03

## 2018-10-16 NOTE — TELEPHONE ENCOUNTER
From: Myke Steele  To: Shelly George M.D.  Sent: 10/16/2018 12:35 PM PDT  Subject: Prescription Question    Hi Dr George   Can you send in a prescription for    amLODIPine 5 MG Tabs  Commonly known as: NORVASC  Take 1 tab by mouth every day.  90 days    Thanks  Myke

## 2018-10-17 ENCOUNTER — NON-PROVIDER VISIT (OUTPATIENT)
Dept: INTERNAL MEDICINE | Facility: IMAGING CENTER | Age: 73
End: 2018-10-17
Payer: MEDICARE

## 2018-10-17 DIAGNOSIS — Z23 NEED FOR INFLUENZA VACCINATION: ICD-10-CM

## 2018-10-17 PROCEDURE — 90662 IIV NO PRSV INCREASED AG IM: CPT | Performed by: FAMILY MEDICINE

## 2018-10-17 PROCEDURE — G0008 ADMIN INFLUENZA VIRUS VAC: HCPCS | Performed by: FAMILY MEDICINE

## 2018-10-21 ENCOUNTER — PATIENT MESSAGE (OUTPATIENT)
Dept: INTERNAL MEDICINE | Facility: IMAGING CENTER | Age: 73
End: 2018-10-21

## 2018-10-21 DIAGNOSIS — K59.00 CONSTIPATION, UNSPECIFIED CONSTIPATION TYPE: ICD-10-CM

## 2018-10-22 RX ORDER — AMLODIPINE BESYLATE 5 MG/1
TABLET ORAL
Qty: 90 TAB | Refills: 0 | Status: SHIPPED | OUTPATIENT
Start: 2018-10-22 | End: 2019-02-14

## 2018-10-22 RX ORDER — PSEUDOEPHEDRINE HCL 30 MG
100 TABLET ORAL 2 TIMES DAILY
Qty: 180 CAP | Refills: 1 | Status: SHIPPED | OUTPATIENT
Start: 2018-10-22 | End: 2019-09-05 | Stop reason: SDUPTHER

## 2018-10-22 NOTE — TELEPHONE ENCOUNTER
From: Myke Steele  To: Shelly George M.D.  Sent: 10/21/2018 7:57 PM PDT  Subject: Prescription Question    Hi Dr George   Can you please send in refill prescription for  docusate sodium 100 MG Caps  Take 100 mg by mouth 2 times a day.  [id number] Caps    Thanks  Myke

## 2018-12-17 ENCOUNTER — OFFICE VISIT (OUTPATIENT)
Dept: DERMATOLOGY | Facility: IMAGING CENTER | Age: 73
End: 2018-12-17
Payer: MEDICARE

## 2018-12-17 DIAGNOSIS — D22.9 MULTIPLE NEVI: ICD-10-CM

## 2018-12-17 DIAGNOSIS — Z85.828 HISTORY OF BASAL CELL CARCINOMA: ICD-10-CM

## 2018-12-17 DIAGNOSIS — L30.9 ECZEMA, UNSPECIFIED TYPE: ICD-10-CM

## 2018-12-17 DIAGNOSIS — L82.1 SEBORRHEIC KERATOSES: ICD-10-CM

## 2018-12-17 PROCEDURE — 99213 OFFICE O/P EST LOW 20 MIN: CPT | Performed by: DERMATOLOGY

## 2018-12-17 ASSESSMENT — ENCOUNTER SYMPTOMS
FEVER: 0
CHILLS: 0

## 2018-12-17 NOTE — PROGRESS NOTES
Dermatology Return Patient Visit    Chief Complaint   Patient presents with   • Follow-Up       Subjective:     HPI:   Myke Steele is a 72 y.o. male presenting for    NILSON, history of BCC, AK's  Has several brown spots on the back, not able to monitor them    Eczema on the foot resolved  Used triamcinolone x 1 week, uses urea intermittently  No activity in several weeks    History of skin cancer: yes, BCC 11/13/17 - superficial multifocal  History of blistering/severe sunburns:No  Family history of skin cancer:No  Family history of atypical moles:No      Current Outpatient Prescriptions on File Prior to Visit   Medication Sig Dispense Refill   • amLODIPine (NORVASC) 5 MG Tab TAKE 1 TAB BY MOUTH EVERY DAY. 90 Tab 0   • docusate sodium 100 MG Cap Take 100 mg by mouth 2 Times a Day. 180 Cap 1   • amLODIPine (NORVASC) 5 MG Tab Take 1 Tab by mouth every day. 90 Tab 3   • metFORMIN ER (GLUCOPHAGE XR) 500 MG TABLET SR 24 HR Take 2 Tabs by mouth every day. 180 Tab 3   • azithromycin (ZITHROMAX Z-OWEN) 250 MG Tab Take 2 po today and then 1 daily until gone 6 Tab 0   • triamcinolone acetonide (KENALOG) 0.1 % Cream Apply 1 Application to affected area(s) 2 times a day. 45 g 2   • tadalafil (CIALIS) 5 MG tablet Take 1 Tab by mouth every day. 90 Tab 1   • carvedilol (COREG) 12.5 MG Tab Take 1 Tab by mouth 2 times a day, with meals. 180 Tab 3   • levothyroxine (SYNTHROID) 75 MCG Tab TAKE 1 TAB BY MOUTH EVERY DAY. 90 Tab 3   • lisinopril (PRINIVIL, ZESTRIL) 40 MG tablet TAKE 1 TAB BY MOUTH EVERY DAY. 90 Tab 3   • FEXOFENADINE HCL PO Take  by mouth.     • Flaxseed, Linseed, (FLAX SEEDS PO) Take  by mouth.     • finasteride (PROSCAR) 5 MG Tab Take 5 mg by mouth every day.     • Coenzyme Q10 (CO Q-10) 100 MG Cap Take 200 mg by mouth every day.     • Alpha-Lipoic Acid 600 MG Cap Take 600 mg by mouth every day.     • LevOCARNitine (CARNITINE, L,) Powder 3 g by Does not apply route every day.     • aspirin (ASA) 81 MG Chew Tab  chewable tablet Take 243 mg by mouth every day. 100 Tab 11     No current facility-administered medications on file prior to visit.        No Known Allergies     Review of Systems   Constitutional: Negative for chills and fever.   Skin: Negative for itching and rash.   All other systems reviewed and are negative.       Objective:     A full mucocutaneous exam was completed including: scalp, hair, ears, face, eyelids, conjunctiva, lips, gums/tongue/oropharynx, neck, chest, abdomen, back, left and right upper extremities (including hands/digits and fingernails), left and right lower extremities (including feet/toes, but toenails covered in polish), buttocks, excluding external genitalia (patient refusal) with the following pertinent findings listed below. Remaining above-listed examined areas within normal limits / negative for rashes or lesions.      Physical Exam   Constitutional: He is oriented to person, place, and time and well-developed, well-nourished, and in no distress.   HENT:   Head: Normocephalic and atraumatic.   Right Ear: External ear normal.   Left Ear: External ear normal.   Nose: Nose normal.   Mouth/Throat: Oropharynx is clear and moist.   Eyes: Conjunctivae and lids are normal.   Neck: Normal range of motion. Neck supple.   Cardiovascular: Intact distal pulses.    Pulmonary/Chest: Effort normal.   Neurological: He is alert and oriented to person, place, and time.   Skin: Skin is warm and dry.        Psychiatric: Mood and affect normal.       DATA: none applicable to review    Assessment and Plan:     1. Multiple nevi  - Benign-appearing nature of lesions discussed. Advised to return to clinic for any new or concerning changes.  - ABCDE's of melanoma discussed    2. Seborrheic keratoses  - Benign-appearing nature of lesions discussed. Advised to return to clinic for any new or concerning changes.    3. History of basal cell carcinoma  Skin cancer education  - discussed importance of sun protective  clothing, eyewear  - discussed importance of daily use of broad spectrum sunscreen with SPF 30 or greater, as well as need for reapplication ~every 2 hours when exposed to UVR  - discussed importance of regular self-exams, ideally once per month, every 4-6 months exams in clinic  - ABCDE's of melanoma discussed  - patient to bring any new or concerning lesions to my attention    4. Eczema, unspecified type  - resolved  - tac 0.1% crema PRN  - extensively discussed importance of regular moisturization to the affected area during flares, and also for maintenance therapy liberally, several times a day, to protect the skin barrier. OTC moisturizers recommended    Followup: Return for jason 4-6 months.    Lexy Monson M.D.

## 2018-12-18 ENCOUNTER — TELEPHONE (OUTPATIENT)
Dept: CARDIOLOGY | Facility: MEDICAL CENTER | Age: 73
End: 2018-12-18

## 2018-12-18 NOTE — TELEPHONE ENCOUNTER
Received fax from [facility] (P: [phone] F: [phone]) requesting for cardiac clearance for EGD and Colonoscopy on 02/11/2019.    Request relayed to MD to advise.

## 2018-12-19 NOTE — TELEPHONE ENCOUNTER
Patient Calls   Raj Olvera M.D.   You Yesterday (12:57 PM)     Safe to proceed may hold aspirin (Routing comment)      Fax request completed with MD recommendations.    [facility] [phone], with completed status.    Fax sent to scanning for reference.

## 2019-02-01 ENCOUNTER — PATIENT MESSAGE (OUTPATIENT)
Dept: INTERNAL MEDICINE | Facility: IMAGING CENTER | Age: 74
End: 2019-02-01

## 2019-02-01 DIAGNOSIS — N13.8 BENIGN PROSTATIC HYPERPLASIA WITH URINARY OBSTRUCTION: Chronic | ICD-10-CM

## 2019-02-01 DIAGNOSIS — N40.1 BENIGN PROSTATIC HYPERPLASIA WITH URINARY OBSTRUCTION: Chronic | ICD-10-CM

## 2019-02-01 RX ORDER — TADALAFIL 5 MG/1
5 TABLET ORAL DAILY
Qty: 84 TAB | Refills: 0 | Status: SHIPPED
Start: 2019-02-01 | End: 2019-05-16 | Stop reason: SDUPTHER

## 2019-02-01 NOTE — TELEPHONE ENCOUNTER
From: Myke Steele  To: Shelly George M.D.  Sent: 2/1/2019 3:06 PM PST  Subject: Prescription Question    Hi Dr George,    Can you sent a refill request to the [facility] for the following:    Tadalafil  [Generic version of Cialis] (RX) [facility] 5 mg 84 $95.00    Thanks for your help   Hope all is well with you    Myke

## 2019-02-14 ENCOUNTER — OFFICE VISIT (OUTPATIENT)
Dept: INTERNAL MEDICINE | Facility: IMAGING CENTER | Age: 74
End: 2019-02-14
Payer: MEDICARE

## 2019-02-14 VITALS
DIASTOLIC BLOOD PRESSURE: 75 MMHG | OXYGEN SATURATION: 97 % | HEART RATE: 75 BPM | RESPIRATION RATE: 14 BRPM | SYSTOLIC BLOOD PRESSURE: 160 MMHG | TEMPERATURE: 97.8 F | WEIGHT: 277 LBS | BODY MASS INDEX: 38.78 KG/M2 | HEIGHT: 71 IN
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
## 2019-02-14 DIAGNOSIS — S40.012A CONTUSION OF LEFT SHOULDER, INITIAL ENCOUNTER: ICD-10-CM

## 2019-02-14 PROCEDURE — 99213 OFFICE O/P EST LOW 20 MIN: CPT | Performed by: FAMILY MEDICINE
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
## 2019-02-27 ENCOUNTER — PATIENT MESSAGE (OUTPATIENT)
Dept: INTERNAL MEDICINE | Facility: IMAGING CENTER | Age: 74
End: 2019-02-27

## 2019-02-28 RX ORDER — OSELTAMIVIR PHOSPHATE 75 MG/1
75 CAPSULE ORAL 2 TIMES DAILY
Qty: 10 CAP | Refills: 0 | Status: SHIPPED | OUTPATIENT
Start: 2019-02-28 | End: 2019-04-08

## 2019-02-28 NOTE — TELEPHONE ENCOUNTER
From: Myke Steele  To: Shelly George M.D.  Sent: 2/27/2019 5:38 PM PST  Subject: Non-Urgent Medical Question    Just wanted to let you know that Adrienne got dizzy   When she got up to go to the bathroom and slipped   And fell. I wasn’t able to get her up and called paramedics   They checked her blood pressure and it was okay.  They go her up and took her to [facility] to check  Her out.  I’m on my way there now    Myke

## 2019-03-04 ENCOUNTER — NON-PROVIDER VISIT (OUTPATIENT)
Dept: INTERNAL MEDICINE | Facility: IMAGING CENTER | Age: 74
End: 2019-03-04
Payer: MEDICARE

## 2019-03-04 VITALS — DIASTOLIC BLOOD PRESSURE: 90 MMHG | SYSTOLIC BLOOD PRESSURE: 150 MMHG

## 2019-03-04 NOTE — NON-PROVIDER
Patient comes in today to have home BP machine checked with manual cuff.  He has had upper respiratory infection for past week.  Starting to feel better.  His wife has been in the hospital with influenza, she is now home.  He has not been sleeping well.  Home PB cuff 154/91 HR 74.  Encourage rest, sleep & good hydration.  He will discontinue monitoring BP until feeling better (about 1 week)

## 2019-03-15 DIAGNOSIS — I10 ESSENTIAL HYPERTENSION: ICD-10-CM

## 2019-03-15 RX ORDER — LISINOPRIL 40 MG/1
40 TABLET ORAL DAILY
Qty: 90 TAB | Refills: 3 | Status: SHIPPED | OUTPATIENT
Start: 2019-03-15 | End: 2020-04-25 | Stop reason: SDUPTHER

## 2019-04-08 ENCOUNTER — HOSPITAL ENCOUNTER (OUTPATIENT)
Facility: MEDICAL CENTER | Age: 74
End: 2019-04-08
Attending: FAMILY MEDICINE
Payer: MEDICARE

## 2019-04-08 ENCOUNTER — OFFICE VISIT (OUTPATIENT)
Dept: INTERNAL MEDICINE | Facility: IMAGING CENTER | Age: 74
End: 2019-04-08
Payer: MEDICARE

## 2019-04-08 VITALS
BODY MASS INDEX: 38.78 KG/M2 | SYSTOLIC BLOOD PRESSURE: 138 MMHG | HEIGHT: 71 IN | DIASTOLIC BLOOD PRESSURE: 76 MMHG | TEMPERATURE: 98.9 F | OXYGEN SATURATION: 95 % | WEIGHT: 277 LBS | HEART RATE: 81 BPM | RESPIRATION RATE: 16 BRPM

## 2019-04-08 DIAGNOSIS — S29.011A STRAIN OF LEFT PECTORALIS MUSCLE, INITIAL ENCOUNTER: ICD-10-CM

## 2019-04-08 DIAGNOSIS — R07.89 OTHER CHEST PAIN: ICD-10-CM

## 2019-04-08 DIAGNOSIS — I10 ESSENTIAL HYPERTENSION: ICD-10-CM

## 2019-04-08 DIAGNOSIS — E11.9 TYPE 2 DIABETES MELLITUS WITHOUT COMPLICATION, WITHOUT LONG-TERM CURRENT USE OF INSULIN (HCC): ICD-10-CM

## 2019-04-08 DIAGNOSIS — Z00.00 HEALTH CARE MAINTENANCE: ICD-10-CM

## 2019-04-08 DIAGNOSIS — Z12.5 SCREENING FOR PROSTATE CANCER: ICD-10-CM

## 2019-04-08 LAB
ALBUMIN SERPL BCP-MCNC: 4.4 G/DL (ref 3.2–4.9)
ALBUMIN/GLOB SERPL: 1.8 G/DL
ALP SERPL-CCNC: 56 U/L (ref 30–99)
ALT SERPL-CCNC: 49 U/L (ref 2–50)
ANION GAP SERPL CALC-SCNC: 8 MMOL/L (ref 0–11.9)
AST SERPL-CCNC: 28 U/L (ref 12–45)
BASOPHILS # BLD AUTO: 0.9 % (ref 0–1.8)
BASOPHILS # BLD: 0.09 K/UL (ref 0–0.12)
BILIRUB SERPL-MCNC: 0.8 MG/DL (ref 0.1–1.5)
BUN SERPL-MCNC: 20 MG/DL (ref 8–22)
CALCIUM SERPL-MCNC: 10.2 MG/DL (ref 8.5–10.5)
CHLORIDE SERPL-SCNC: 102 MMOL/L (ref 96–112)
CK SERPL-CCNC: 260 U/L (ref 0–154)
CO2 SERPL-SCNC: 27 MMOL/L (ref 20–33)
CREAT SERPL-MCNC: 0.94 MG/DL (ref 0.5–1.4)
CREAT UR-MCNC: 130 MG/DL
EOSINOPHIL # BLD AUTO: 0.07 K/UL (ref 0–0.51)
EOSINOPHIL NFR BLD: 0.7 % (ref 0–6.9)
ERYTHROCYTE [DISTWIDTH] IN BLOOD BY AUTOMATED COUNT: 46.5 FL (ref 35.9–50)
GLOBULIN SER CALC-MCNC: 2.5 G/DL (ref 1.9–3.5)
GLUCOSE SERPL-MCNC: 163 MG/DL (ref 65–99)
HCT VFR BLD AUTO: 47.7 % (ref 42–52)
HGB BLD-MCNC: 16 G/DL (ref 14–18)
IMM GRANULOCYTES # BLD AUTO: 0.07 K/UL (ref 0–0.11)
IMM GRANULOCYTES NFR BLD AUTO: 0.7 % (ref 0–0.9)
LYMPHOCYTES # BLD AUTO: 2.07 K/UL (ref 1–4.8)
LYMPHOCYTES NFR BLD: 19.7 % (ref 22–41)
MCH RBC QN AUTO: 31.1 PG (ref 27–33)
MCHC RBC AUTO-ENTMCNC: 33.5 G/DL (ref 33.7–35.3)
MCV RBC AUTO: 92.8 FL (ref 81.4–97.8)
MICROALBUMIN UR-MCNC: <0.7 MG/DL
MICROALBUMIN/CREAT UR: NORMAL MG/G (ref 0–30)
MONOCYTES # BLD AUTO: 0.83 K/UL (ref 0–0.85)
MONOCYTES NFR BLD AUTO: 7.9 % (ref 0–13.4)
NEUTROPHILS # BLD AUTO: 7.39 K/UL (ref 1.82–7.42)
NEUTROPHILS NFR BLD: 70.1 % (ref 44–72)
NRBC # BLD AUTO: 0 K/UL
NRBC BLD-RTO: 0 /100 WBC
PLATELET # BLD AUTO: 183 K/UL (ref 164–446)
PMV BLD AUTO: 11.5 FL (ref 9–12.9)
POTASSIUM SERPL-SCNC: 4.2 MMOL/L (ref 3.6–5.5)
PROT SERPL-MCNC: 6.9 G/DL (ref 6–8.2)
PSA SERPL-MCNC: 1.31 NG/ML (ref 0–4)
RBC # BLD AUTO: 5.14 M/UL (ref 4.7–6.1)
SODIUM SERPL-SCNC: 137 MMOL/L (ref 135–145)
TROPONIN I SERPL-MCNC: <0.01 NG/ML (ref 0–0.04)
WBC # BLD AUTO: 10.5 K/UL (ref 4.8–10.8)

## 2019-04-08 PROCEDURE — 82043 UR ALBUMIN QUANTITATIVE: CPT

## 2019-04-08 PROCEDURE — 84484 ASSAY OF TROPONIN QUANT: CPT

## 2019-04-08 PROCEDURE — 82570 ASSAY OF URINE CREATININE: CPT

## 2019-04-08 PROCEDURE — 85025 COMPLETE CBC W/AUTO DIFF WBC: CPT

## 2019-04-08 PROCEDURE — 80053 COMPREHEN METABOLIC PANEL: CPT

## 2019-04-08 PROCEDURE — 83036 HEMOGLOBIN GLYCOSYLATED A1C: CPT

## 2019-04-08 PROCEDURE — 99214 OFFICE O/P EST MOD 30 MIN: CPT | Performed by: FAMILY MEDICINE

## 2019-04-08 PROCEDURE — 84153 ASSAY OF PSA TOTAL: CPT

## 2019-04-08 PROCEDURE — 82550 ASSAY OF CK (CPK): CPT

## 2019-04-09 ENCOUNTER — NON-PROVIDER VISIT (OUTPATIENT)
Dept: INTERNAL MEDICINE | Facility: IMAGING CENTER | Age: 74
End: 2019-04-09
Payer: MEDICARE

## 2019-04-09 ENCOUNTER — HOSPITAL ENCOUNTER (OUTPATIENT)
Facility: MEDICAL CENTER | Age: 74
End: 2019-04-09
Attending: FAMILY MEDICINE
Payer: MEDICARE

## 2019-04-09 DIAGNOSIS — I25.83 CORONARY ARTERIOSCLEROSIS DUE TO LIPID RICH PLAQUE: Chronic | ICD-10-CM

## 2019-04-09 DIAGNOSIS — I25.10 CORONARY ARTERIOSCLEROSIS DUE TO LIPID RICH PLAQUE: Chronic | ICD-10-CM

## 2019-04-09 DIAGNOSIS — E78.5 DYSLIPIDEMIA: ICD-10-CM

## 2019-04-09 DIAGNOSIS — I10 ESSENTIAL HYPERTENSION: ICD-10-CM

## 2019-04-09 LAB
CHOLEST SERPL-MCNC: 212 MG/DL (ref 100–199)
EST. AVERAGE GLUCOSE BLD GHB EST-MCNC: 151 MG/DL
HBA1C MFR BLD: 6.9 % (ref 0–5.6)
HDLC SERPL-MCNC: 44 MG/DL
LDLC SERPL CALC-MCNC: 122 MG/DL
TRIGL SERPL-MCNC: 228 MG/DL (ref 0–149)

## 2019-04-09 PROCEDURE — 80061 LIPID PANEL: CPT

## 2019-04-09 NOTE — PROGRESS NOTES
"Chief Complaint   Patient presents with   • Arm Pain     Left chest almost \"armpit\" pain. Has had rotator cuff surgery 3-4 years ago. Will feel tightness across left shoulders and left chest and into latismus muscles. Pain started 4 days ago has been using heat/ice therapy    • Nasal Congestion     Allergies have been \"really bad\"        HPI:  Patient is a 73 y.o. male established patient of Dr. George who presents today for evaluation of new left sided chest muscle pain, near his axilla, present for the past four days. He also reports generalized muscle tightness across the back of his shoulders and denies new activity or injury to this area. He also denies associated acute illness, no shortness of breath, no anginal symptoms, no diaphoreses but does report that his wife was hospitalized with influenza recently. He has been using ice/heat on affected area with some relief but not resolution. He also feels congested from seasonal allergies for the last week and is taking OTC medication relief. He is sitting in my chair in no apparent distress and was able to ambulate with his cane without difficulty. He denies changes in chronic medical conditions and is aware that he is behind with lab draw and follow up with Dr. George.     Patient Active Problem List    Diagnosis Date Noted   • BMI 38.0-38.9,adult 08/31/2018   • Type 2 diabetes mellitus without complication, without long-term current use of insulin ([facility]) 08/31/2018   • Hypothyroid 12/28/2017   • Vascular claudication ([facility]) 11/30/2016   • Neuropathy ([facility]) 11/30/2016   • Essential hypertension 03/19/2016   • Foot drop, bilateral 03/18/2016   • Thyroid activity decreased 09/23/2015   • Constipation 05/04/2015   • Lumbosacral spondylosis without myelopathy 04/29/2015   • DDD (degenerative disc disease), lumbar 12/31/2014   • Spinal stenosis of lumbar region 12/31/2014   • Leukocytosis 09/05/2014   • Obesity 08/28/2014   • Coronary arteriosclerosis due to lipid rich " "plaque    • Stented coronary artery 08/09/2012   • BPH (benign prostatic hypertrophy) with urinary obstruction 08/09/2012   • Dyslipidemia 08/09/2012       Past medical, surgical, family, and social history was reviewed in Epic chart by me today.     Medications and allergies reviewed and updated in Epic chart by me today.     EKG done at visit today and interpreted by me: very poor baseline HR 98 NSR with RBBB/ PVCs - EKG compared to last one one file done 12/28/17 shows prolonged QTc but baseline is poor and RBBB/ PVCs present back then also.     ROS:  Pertinent positives listed above in HPI. All other systems have been reviewed and are negative.    PE:   /76 (BP Location: Left arm, Patient Position: Sitting, BP Cuff Size: Adult)   Pulse 81   Temp 37.2 °C (98.9 °F) (Temporal)   Resp 16   Ht 1.803 m (5' 11\")   Wt (!) 125.6 kg (277 lb)   SpO2 95%   BMI 38.63 kg/m²   Vital signs reviewed with patient.     Gen: Well developed; well nourished; no acute distress; age appropriate appearance   CV: Regular rate and rhythm; S1/ S2 present; no murmur, gallop or rub noted  Chest: patient has soreness on lateral edge of left pec muscle near axilla/ no adenopathy palpated in left axilla and no signs of costochondritis; there is a stretch crystal striation prominent in this area   Pulm: No respiratory distress; clear to ascultation b/l; no wheezing or stridor noted b/l  Abd: Adequate bowel sounds noted; soft and nontender; no rebound, rigidity, nor distention; obese  Extremities: No peripheral edema b/l LE extremities/ no clubbing nor cyanosis noted  Skin: Warm and dry; no rashes noted   Neuro: No focal deficits noted   Psych: AAOx4; mood and affect are appropriate    A/P:  1. Other chest pain  Due to his multiple comorbid conditions, EKG and STAT trop/ CK level will be obtained. Although the baseline is poor on multiple EKG attempts, I believe that this pain is not likely of primary cardiac origin.   - EKG - Clinic " Performed  - TROPONIN; Future  - CREATINE KINASE; Future    2. Strain of left pectoralis muscle, initial encounter  Recommend patient continue supportive care measures, avoid over straining area, and follow clinical response.     3. Screening for prostate cancer  Pt is due for PSA and will obtain with lab draw today.   - PROSTATE SPECIFIC AG SCREENING; Future    4. Health care maintenance  Pt is overdue for all annual labs - will have him due CMP today to ensure there is a not an underlying metabolic component to his acute complaint and will have him return tomorrow for fasting lipid panel.   - Comp Metabolic Panel; Future     I will call patient later tonight if STAT labs are not within normal limits, and I will update Dr. George accordingly.

## 2019-04-11 ENCOUNTER — OFFICE VISIT (OUTPATIENT)
Dept: INTERNAL MEDICINE | Facility: IMAGING CENTER | Age: 74
End: 2019-04-11
Payer: MEDICARE

## 2019-04-11 VITALS
TEMPERATURE: 98.1 F | SYSTOLIC BLOOD PRESSURE: 150 MMHG | OXYGEN SATURATION: 95 % | HEART RATE: 96 BPM | RESPIRATION RATE: 16 BRPM | BODY MASS INDEX: 37.52 KG/M2 | HEIGHT: 71 IN | DIASTOLIC BLOOD PRESSURE: 80 MMHG | WEIGHT: 268 LBS

## 2019-04-11 DIAGNOSIS — M25.512 CHRONIC LEFT SHOULDER PAIN: ICD-10-CM

## 2019-04-11 DIAGNOSIS — G89.29 CHRONIC LEFT SHOULDER PAIN: ICD-10-CM

## 2019-04-11 DIAGNOSIS — S29.011A STRAIN OF LEFT PECTORALIS MUSCLE, INITIAL ENCOUNTER: ICD-10-CM

## 2019-04-11 DIAGNOSIS — R07.81 RIB PAIN ON LEFT SIDE: ICD-10-CM

## 2019-04-11 PROCEDURE — 99213 OFFICE O/P EST LOW 20 MIN: CPT | Performed by: FAMILY MEDICINE

## 2019-04-11 RX ORDER — METHOCARBAMOL 750 MG/1
750 TABLET, FILM COATED ORAL 3 TIMES DAILY
Qty: 30 TAB | Refills: 0 | Status: SHIPPED | OUTPATIENT
Start: 2019-04-11 | End: 2019-04-12 | Stop reason: CLARIF

## 2019-04-11 RX ORDER — OMEPRAZOLE 20 MG/1
CAPSULE, DELAYED RELEASE ORAL
COMMUNITY
Start: 2019-04-10 | End: 2019-09-18 | Stop reason: SDUPTHER

## 2019-04-11 NOTE — PROGRESS NOTES
Chief Complaint   Patient presents with   • Chest Wall Pain     left   • Neck Pain   • Shoulder Pain   • Rib Pain       HISTORY OF PRESENT ILLNESS: Patient is a 73 y.o. male established patient who presents today complaining of left posterior shoulder pain and pain in his left pectoralis muscle.  He also has some pain in his left neck and his left lower rib.  This started about 5 days ago.  He has had similar pain in the past.  He was evaluated here 3 days ago by Dr. Ruiz.  She thought this was mostly musculoskeletal.  He did have a negative troponin because he does have a history of CAD.  His EKG showed no change.  He remembers no injury but states he does carry heavy grocery bags into the house and thinks that may have been a contributing factor.  No falls.  He did have rotator cuff surgery on his left shoulder.  He describes the pain as mostly in his left pec were inserts into his shoulder.  Pain comes and goes and does seem to be positional.  He has some pain that radiates across his trapezius area and onto the back of his shoulder.  No pain numbness or tingling into his arm.  He also complains of some pain on the left lower anterior rib.  He states that pain comes and goes and also seems to be positional.  No trauma.  No history of shingles there.  He does not take anti-inflammatories due to GI symptoms.  He has been using some Voltaren gel which does help.  He is done physical therapy in the past for his shoulder pain and that helps.  He states his symptoms have improved since 5 days ago.      Patient Active Problem List    Diagnosis Date Noted   • BMI 38.0-38.9,adult 08/31/2018   • Type 2 diabetes mellitus without complication, without long-term current use of insulin ([facility]) 08/31/2018   • Hypothyroid 12/28/2017   • Vascular claudication ([facility]) 11/30/2016   • Neuropathy ([facility]) 11/30/2016   • Essential hypertension 03/19/2016   • Foot drop, bilateral 03/18/2016   • Thyroid activity decreased 09/23/2015   •  Constipation 05/04/2015   • Lumbosacral spondylosis without myelopathy 04/29/2015   • DDD (degenerative disc disease), lumbar 12/31/2014   • Spinal stenosis of lumbar region 12/31/2014   • Leukocytosis 09/05/2014   • Obesity 08/28/2014   • Coronary arteriosclerosis due to lipid rich plaque    • Stented coronary artery 08/09/2012   • BPH (benign prostatic hypertrophy) with urinary obstruction 08/09/2012   • Dyslipidemia 08/09/2012     Current Outpatient Prescriptions on File Prior to Visit   Medication Sig Dispense Refill   • lisinopril (PRINIVIL, ZESTRIL) 40 MG tablet TAKE 1 TAB BY MOUTH EVERY DAY. 90 Tab 3   • tadalafil (CIALIS) 5 MG tablet Take 1 Tab by mouth every day. 84 Tab 0   • amLODIPine (NORVASC) 5 MG Tab Take 1 Tab by mouth every day. 90 Tab 3   • metFORMIN ER (GLUCOPHAGE XR) 500 MG TABLET SR 24 HR Take 2 Tabs by mouth every day. 180 Tab 3   • carvedilol (COREG) 12.5 MG Tab Take 1 Tab by mouth 2 times a day, with meals. 180 Tab 3   • levothyroxine (SYNTHROID) 75 MCG Tab TAKE 1 TAB BY MOUTH EVERY DAY. 90 Tab 3   • Flaxseed, Linseed, (FLAX SEEDS PO) Take  by mouth.     • finasteride (PROSCAR) 5 MG Tab Take 5 mg by mouth every day.     • Coenzyme Q10 (CO Q-10) 100 MG Cap Take 200 mg by mouth every day.     • LevOCARNitine (CARNITINE, L,) Powder 3 g by Does not apply route every day.     • aspirin (ASA) 81 MG Chew Tab chewable tablet Take 243 mg by mouth every day. 100 Tab 11   • docusate sodium 100 MG Cap Take 100 mg by mouth 2 Times a Day. 180 Cap 1   • triamcinolone acetonide (KENALOG) 0.1 % Cream Apply 1 Application to affected area(s) 2 times a day. 45 g 2   • FEXOFENADINE HCL PO Take  by mouth.       No current facility-administered medications on file prior to visit.          Past medical, surgical, family, and social history is reviewed and updated in Epic chart by me today.   Medications and allergies reviewed and updated in Epic chart by me today.     REVIEW OF SYSTEMS:  GENERAL: No fatigue, no  "weight loss.  CV:  No chest pain,dyspnea,palpitations or edema.  RESP:  No sob,cough,wheezing or hemoptysis.  GI: No dysphagia, heartburn,abdominal pain, nausea, vomiting, diarrhea or constipation.       No melena, jaundice, bleeding, incontinence or change in bowel habits.  :  No dysuria, polyuria, hematuria, incontinence, or nocturia.  MS: Symptoms as above.  NEURO:  No seizures, syncope, paralysis, tremor, or weakness.  SKIN: No new or concerning skin lesions or changes.   PSYCH: Mood fine.    Vitals:    04/11/19 0830   BP: 150/80                      blood pressure repeat 142/82   BP Location: Left arm   Patient Position: Sitting   BP Cuff Size: Large adult   Pulse: 96   Resp: 16   Temp: 36.7 °C (98.1 °F)   TempSrc: Temporal   SpO2: 95%   Weight: 121.6 kg (268 lb)   Height: 1.803 m (5' 11\")       Physical Exam:  Gen: Overweight male.. No acute distress.  Neck:  Supple, no adenopathy or thyromegaly.  Full range of motion of his neck.  He does have a little tenderness on the left paraspinous muscles and across to his left trapezius.    Heart:  Regular rate and rhythm.  Normal S1, S2. No murmur, gallop or rub.  Lungs:  Clear, No wheezes,rales or rhonchi.  Extremities:  No edema.  Musculoskeletal:.  He does have tenderness in his left pectoralis muscle as it inserts into the shoulder.  He has fairly good range of motion of his shoulder.  Decreased internal rotation.  There is no discoloration or deformity.  Ribs: Nontender.  No rashes.  Psych: Mood and affect are appropriate.        Assessment/Plan:  1. Strain of left pectoralis muscle, initial encounter     2. Chronic left shoulder pain     3. Rib pain on left side     I do think his pain is all musculoskeletal.  We discussed physical therapy which he declines now due to travel.  He states if it continues to bother him he will call for referral.  He will continue with Voltaren gel 2-3 times daily and will add Robaxin at bedtime.  Gentle stretches.  Call for " concerns.

## 2019-04-12 RX ORDER — TIZANIDINE 4 MG/1
4 TABLET ORAL 2 TIMES DAILY PRN
Qty: 30 TAB | Refills: 0 | Status: SHIPPED | OUTPATIENT
Start: 2019-04-12 | End: 2019-05-08 | Stop reason: SDUPTHER

## 2019-05-08 RX ORDER — TIZANIDINE 4 MG/1
4 TABLET ORAL 2 TIMES DAILY PRN
Qty: 30 TAB | Refills: 0 | Status: SHIPPED | OUTPATIENT
Start: 2019-05-08 | End: 2019-07-09

## 2019-05-13 ENCOUNTER — OFFICE VISIT (OUTPATIENT)
Dept: DERMATOLOGY | Facility: IMAGING CENTER | Age: 74
End: 2019-05-13
Payer: MEDICARE

## 2019-05-13 DIAGNOSIS — Z85.828 HISTORY OF BASAL CELL CARCINOMA: ICD-10-CM

## 2019-05-13 DIAGNOSIS — D22.9 MULTIPLE NEVI: ICD-10-CM

## 2019-05-13 DIAGNOSIS — L82.1 SEBORRHEIC KERATOSES: ICD-10-CM

## 2019-05-13 DIAGNOSIS — L57.0 ACTINIC KERATOSES: ICD-10-CM

## 2019-05-13 PROCEDURE — 17000 DESTRUCT PREMALG LESION: CPT | Performed by: DERMATOLOGY

## 2019-05-13 PROCEDURE — 17003 DESTRUCT PREMALG LES 2-14: CPT | Performed by: DERMATOLOGY

## 2019-05-13 PROCEDURE — 99213 OFFICE O/P EST LOW 20 MIN: CPT | Mod: 25 | Performed by: DERMATOLOGY

## 2019-05-13 ASSESSMENT — ENCOUNTER SYMPTOMS
CHILLS: 0
FEVER: 0

## 2019-05-13 NOTE — PROGRESS NOTES
Dermatology Return Patient Visit    Chief Complaint   Patient presents with   • Follow-Up     NILSON    • Basal Cell Carcinoma       Subjective:     HPI:   Myke Steele is a 72 y.o. male presenting for    Follow up NILSON   Red spots on the left temple  Present x months  No recent change in size  No itching/bleeding/pain  No treatments      Eczema on the foot - only mild, intermittent activity  Uses triamcinolone x 1 week every few weeks, uses urea intermittently    History of skin cancer: yes, BCC 11/13/17 - superficial multifocal, s/p efudex  History of blistering/severe sunburns:No  Family history of skin cancer:No  Family history of atypical moles:No      Current Outpatient Prescriptions on File Prior to Visit   Medication Sig Dispense Refill   • tizanidine (ZANAFLEX) 4 MG Tab TAKE 1 TAB BY MOUTH 2 TIMES A DAY AS NEEDED (MUSCLE SPASM). 30 Tab 0   • omeprazole (PRILOSEC) 20 MG delayed-release capsule      • lisinopril (PRINIVIL, ZESTRIL) 40 MG tablet TAKE 1 TAB BY MOUTH EVERY DAY. 90 Tab 3   • tadalafil (CIALIS) 5 MG tablet Take 1 Tab by mouth every day. 84 Tab 0   • docusate sodium 100 MG Cap Take 100 mg by mouth 2 Times a Day. 180 Cap 1   • amLODIPine (NORVASC) 5 MG Tab Take 1 Tab by mouth every day. 90 Tab 3   • metFORMIN ER (GLUCOPHAGE XR) 500 MG TABLET SR 24 HR Take 2 Tabs by mouth every day. 180 Tab 3   • triamcinolone acetonide (KENALOG) 0.1 % Cream Apply 1 Application to affected area(s) 2 times a day. 45 g 2   • carvedilol (COREG) 12.5 MG Tab Take 1 Tab by mouth 2 times a day, with meals. 180 Tab 3   • levothyroxine (SYNTHROID) 75 MCG Tab TAKE 1 TAB BY MOUTH EVERY DAY. 90 Tab 3   • FEXOFENADINE HCL PO Take  by mouth.     • Flaxseed, Linseed, (FLAX SEEDS PO) Take  by mouth.     • finasteride (PROSCAR) 5 MG Tab Take 5 mg by mouth every day.     • Coenzyme Q10 (CO Q-10) 100 MG Cap Take 200 mg by mouth every day.     • LevOCARNitine (CARNITINE, L,) Powder 3 g by Does not apply route every day.     •  aspirin (ASA) 81 MG Chew Tab chewable tablet Take 243 mg by mouth every day. 100 Tab 11     No current facility-administered medications on file prior to visit.        No Known Allergies     Review of Systems   Constitutional: Negative for chills and fever.   Skin: Positive for rash. Negative for itching.   All other systems reviewed and are negative.       Objective:     A full mucocutaneous exam was completed including: scalp, hair, ears, face, eyelids, conjunctiva, lips, gums/tongue/oropharynx, neck, chest, abdomen, back, left and right upper extremities (including hands/digits and fingernails), left and right lower extremities (including feet/toes, but toenails covered in polish), buttocks, excluding external genitalia (patient refusal) with the following pertinent findings listed below. Remaining above-listed examined areas within normal limits / negative for rashes or lesions.      Physical Exam   Constitutional: He is oriented to person, place, and time and well-developed, well-nourished, and in no distress.   HENT:   Head: Normocephalic and atraumatic.       Right Ear: External ear normal.   Left Ear: External ear normal.   Nose: Nose normal.   Mouth/Throat: Oropharynx is clear and moist.   Eyes: Conjunctivae and lids are normal.   Neck: Normal range of motion. Neck supple.   Cardiovascular: Intact distal pulses.    Pulmonary/Chest: Effort normal.   Neurological: He is alert and oriented to person, place, and time.   Skin: Skin is warm and dry.        Psychiatric: Mood and affect normal.       DATA: none applicable to review    Assessment and Plan:     1. Actinic keratoses  CRYOTHERAPY:  Risks (including, but not limited to: hypo or hyperpigmentation, redness, blister, blood blister, recurrence, need for further treatment, infection, scar) and benefits of cryotherapy discussed. Patient verbally agreed to proceed with treatment. 2 cryotherapy freeze thaw cycles of 10 seconds were applied to 3 lesions on the  temple, leg with cryac. Patient tolerated procedure well. Aftercare instructions given.    2. Multiple nevi  - Benign-appearing nature of lesions discussed. Advised to return to clinic for any new or concerning changes.  - ABCDE's of melanoma discussed    3. Seborrheic keratoses  - Benign-appearing nature of lesions discussed. Advised to return to clinic for any new or concerning changes.    4. History of basal cell carcinoma  Skin cancer education  - discussed importance of sun protective clothing, eyewear  - discussed importance of daily use of broad spectrum sunscreen with SPF 30 or greater, as well as need for reapplication ~every 2 hours when exposed to UVR  - discussed importance of regular self-exams, ideally once per month, every 4-6 months exams in clinic  - ABCDE's of melanoma discussed  - patient to bring any new or concerning lesions to my attention    Eczema, under good control/not discussed  - tac 0.1% cream PRN  - extensively discussed importance of regular moisturization to the affected area during flares, and also for maintenance therapy liberally, several times a day, to protect the skin barrier. OTC moisturizers recommended    Followup: Return for mike 4-6 months.    Lexy Monson M.D.

## 2019-05-16 DIAGNOSIS — N40.1 BENIGN PROSTATIC HYPERPLASIA WITH URINARY OBSTRUCTION: Chronic | ICD-10-CM

## 2019-05-16 DIAGNOSIS — N13.8 BENIGN PROSTATIC HYPERPLASIA WITH URINARY OBSTRUCTION: Chronic | ICD-10-CM

## 2019-05-16 RX ORDER — TADALAFIL 5 MG/1
5 TABLET ORAL DAILY
Qty: 84 TAB | Refills: 0 | Status: SHIPPED | OUTPATIENT
Start: 2019-05-16 | End: 2019-08-19 | Stop reason: SDUPTHER

## 2019-05-20 ENCOUNTER — OFFICE VISIT (OUTPATIENT)
Dept: CARDIOLOGY | Facility: MEDICAL CENTER | Age: 74
End: 2019-05-20
Payer: MEDICARE

## 2019-05-20 VITALS
OXYGEN SATURATION: 91 % | HEIGHT: 71 IN | SYSTOLIC BLOOD PRESSURE: 130 MMHG | HEART RATE: 88 BPM | DIASTOLIC BLOOD PRESSURE: 70 MMHG | WEIGHT: 280 LBS | BODY MASS INDEX: 39.2 KG/M2

## 2019-05-20 DIAGNOSIS — R74.8 ELEVATED CPK: Chronic | ICD-10-CM

## 2019-05-20 DIAGNOSIS — Z95.5 STENTED CORONARY ARTERY: ICD-10-CM

## 2019-05-20 DIAGNOSIS — E78.5 DYSLIPIDEMIA: ICD-10-CM

## 2019-05-20 DIAGNOSIS — I25.10 CORONARY ARTERIOSCLEROSIS DUE TO LIPID RICH PLAQUE: Chronic | ICD-10-CM

## 2019-05-20 DIAGNOSIS — I10 ESSENTIAL HYPERTENSION: ICD-10-CM

## 2019-05-20 DIAGNOSIS — I25.83 CORONARY ARTERIOSCLEROSIS DUE TO LIPID RICH PLAQUE: Chronic | ICD-10-CM

## 2019-05-20 DIAGNOSIS — E78.1 HYPERTRIGLYCERIDEMIA: Chronic | ICD-10-CM

## 2019-05-20 PROCEDURE — 99214 OFFICE O/P EST MOD 30 MIN: CPT | Performed by: INTERNAL MEDICINE

## 2019-05-20 NOTE — PATIENT INSTRUCTIONS
Please look into the following diets and incorporate them into your diet    LOW SALT DIET   KEEP YOUR SODIUM EQUAL TO CALORIES AND NO MORE THAN DOUBLE THE CALORIES FOR A LOW SALT DIET    FOR TREATMENT OR PREVENTION OF CORONARY ARTERY DISEASE    [place] - [facility]    [facility] Reversing [facility] Disease - Marco A [facility] Cardiologist    [facility]    Dr Dumont - [place] [facility] (book and documentary)      FOR TREATMENT OF BLOOD PRESSURE  DASH DIET - American Heart Association for treatment of HYPERTENSION    FOR TREATMENT OF BAD CHOLESTEROL/FATS  REDUCE PROCESSED SUGAR AS MUCH AS POSSIBLE  INCREASE WHOLE GRAINS/VEGETABLES    Lowering total cholesterol and LDL (bad) cholesterol:  - Eat leaner cuts of meat, or eliminate altogether if possible red meat, and frequently substitute fish or chicken.  - Limit saturated fat to no more than 7-10% of total calories no more than 10 g per day is recommended. Some sources of saturated fat include butter, animal fats, hydrogenated vegetable fats and oils, many desserts, whole milk dairy products.  - Replaced saturated fats with polyunsaturated fats and monounsaturated fats. Foods high in monounsaturated fat include nuts, although well, canola oil, avocados, and olives.  - Limit trans fat (processed foods) and replaced with fresh fruits and vegetables  - Recommend nonfat dairy products  - Increase substantially the amount of soluble fiber intake (legumes such as beans, fruit, whole grains).  - Consider nutritional supplements: plant sterile spreads such as Benecol, fish oil,  flaxseed oil, omega-3 acids capsules 1000 mg twice a day, or viscous fiber such as Metamucil  - Attain ideal weight and regular exercise (at least 30 minutes per day of walking)    Lowering triglycerides:  - Reduce intake of simple sugar: Desserts, candy, pastries, honey, sodas, sugared cereals, yogurt, Gatorade, sports bars, canned  fruit, smoothies, fruit juice, coffee drinks  - Reduced intake of refined starches: Refined Pasta  - Reduce or abstain from alcohol  - Increase omega-3 fatty acids: Pixley, Trout, Mackerel, Herring, Albacore tuna and supplements  - Attain ideal weight and regular exercise (at least 30 minutes per day of walking)      Elevating HDL (good) cholesterol:  - Increase physical activity  - Seasoned foods with garlic and onions  - Increase omega-3 fatty acids and supplements as listed above  - Incorporating appropriate amounts of monounsaturated fats such as nuts, olive oil, canola oil, avocados, olives  - Stop smoking  - Attain ideal weight and regular exercise (at least 30 minutes per day of walking)

## 2019-05-21 ENCOUNTER — PATIENT MESSAGE (OUTPATIENT)
Dept: INTERNAL MEDICINE | Facility: IMAGING CENTER | Age: 74
End: 2019-05-21

## 2019-05-21 RX ORDER — METHOCARBAMOL 750 MG/1
TABLET, FILM COATED ORAL
Qty: 180 TAB | Refills: 0 | Status: SHIPPED | OUTPATIENT
Start: 2019-05-21 | End: 2019-08-15 | Stop reason: SDUPTHER

## 2019-05-21 NOTE — TELEPHONE ENCOUNTER
From: Myke Steele  To: Shelly George M.D.  Sent: 5/21/2019 1:45 PM PDT  Subject: Prescription Question    Hi Dr George,  Hope you had a nice vacation.  I saw Dr Olvera yesterday. Everything was okay.  He suggested that I take the new injection drug to reduce   Cholesterol level.  Waiting to hear from [facility] for follow up.    I know that Methocarbamol 750 mg is not covered by   My insurance but the cost is very reasonable.  I tolerate that better than Tizanidine 4mg.    Can you call in 90 day supply prescription for me.    Thanks  Myke

## 2019-05-22 ASSESSMENT — ENCOUNTER SYMPTOMS
COUGH: 0
CHILLS: 0
BLURRED VISION: 0
CLAUDICATION: 0
ABDOMINAL PAIN: 0
FALLS: 0
NAUSEA: 0
DIZZINESS: 0
FOCAL WEAKNESS: 0
WEAKNESS: 1
PALPITATIONS: 0
FEVER: 0
BRUISES/BLEEDS EASILY: 0
SORE THROAT: 0
SHORTNESS OF BREATH: 0
PND: 0

## 2019-05-22 NOTE — PROGRESS NOTES
"Chief Complaint   Patient presents with   • HTN (Controlled)       Subjective:   Myke Steele is a 73 y.o. male who presents today for follow-up of his history of coronary disease status post stenting remotely    He still suffers from lower extremity weakness this has improved over the year    Tolerating his other medications well his weight fluctuates but overall is around the same at 270    Past Medical History:   Diagnosis Date   • Anesthesia 04/07/15    Has never had general anesthesia   • Benign localized hyperplasia of prostate with urinary obstruction and other lower urinary tract symptoms (LUTS)(600.21) 8/9/2012   • Benign prostatic hypertrophy    • Coronary arteriosclerosis due to lipid rich plaque    • Coronary atherosclerosis of native coronary artery 8/9/2012   • Elevated CPK    • Essential hypertriglyceridemia 8/9/2012   • High cholesterol    • Hypercholesterolemia 8/9/2012   • Hypertension    • Hypertriglyceridemia    • Lumbar spinal stenosis    • Obesity 8/28/2014   • Pain 04/07/15    Bilateral lower back, radiating down buttocks=\"aches\" worse with activity   • Stented coronary artery 8/9/2012   • Unspecified cataract     R IOL   • Unspecified disorder of thyroid      Past Surgical History:   Procedure Laterality Date   • LUMBAR LAMINECTOMY DISKECTOMY  4/29/2015    Performed by Juan Pablo Betts M.D. at SURGERY [facility] ORS   • OTHER CARDIAC SURGERY  2008    2 Cardiac stents   • STAPEDECTOMY      bilateral ears; only worked on the R ear     Family History   Problem Relation Age of Onset   • Heart Failure Father    • Heart Disease Father         valvular   • Hypertension Mother    • Hyperlipidemia Mother    • Hyperlipidemia Brother    • Cancer Brother         skin     Social History     Social History   • Marital status:      Spouse name: N/A   • Number of children: N/A   • Years of education: N/A     Occupational History   • Not on file.     Social History Main Topics   • Smoking " status: Never Smoker   • Smokeless tobacco: Never Used   • Alcohol use Yes      Comment: 2-3/week   • Drug use: No   • Sexual activity: Not on file      Comment: , 2 daughters, retired      Other Topics Concern   • Not on file     Social History Narrative   • No narrative on file     No Known Allergies  Outpatient Encounter Prescriptions as of 5/20/2019   Medication Sig Dispense Refill   • tadalafil (CIALIS) 5 MG tablet Take 1 Tab by mouth every day. 84 Tab 0   • tizanidine (ZANAFLEX) 4 MG Tab TAKE 1 TAB BY MOUTH 2 TIMES A DAY AS NEEDED (MUSCLE SPASM). 30 Tab 0   • omeprazole (PRILOSEC) 20 MG delayed-release capsule      • lisinopril (PRINIVIL, ZESTRIL) 40 MG tablet TAKE 1 TAB BY MOUTH EVERY DAY. 90 Tab 3   • docusate sodium 100 MG Cap Take 100 mg by mouth 2 Times a Day. 180 Cap 1   • amLODIPine (NORVASC) 5 MG Tab Take 1 Tab by mouth every day. 90 Tab 3   • metFORMIN ER (GLUCOPHAGE XR) 500 MG TABLET SR 24 HR Take 2 Tabs by mouth every day. 180 Tab 3   • triamcinolone acetonide (KENALOG) 0.1 % Cream Apply 1 Application to affected area(s) 2 times a day. 45 g 2   • carvedilol (COREG) 12.5 MG Tab Take 1 Tab by mouth 2 times a day, with meals. 180 Tab 3   • levothyroxine (SYNTHROID) 75 MCG Tab TAKE 1 TAB BY MOUTH EVERY DAY. 90 Tab 3   • FEXOFENADINE HCL PO Take  by mouth.     • Flaxseed, Linseed, (FLAX SEEDS PO) Take  by mouth.     • finasteride (PROSCAR) 5 MG Tab Take 5 mg by mouth every day.     • Coenzyme Q10 (CO Q-10) 100 MG Cap Take 200 mg by mouth every day.     • LevOCARNitine (CARNITINE, L,) Powder 3 g by Does not apply route every day.     • aspirin (ASA) 81 MG Chew Tab chewable tablet Take 243 mg by mouth every day. 100 Tab 11     No facility-administered encounter medications on file as of 5/20/2019.      Review of Systems   Constitutional: Negative for chills and fever.   HENT: Negative for sore throat.    Eyes: Negative for blurred vision.   Respiratory: Negative for cough and shortness of breath.   "  Cardiovascular: Negative for chest pain, palpitations, claudication, leg swelling and PND.   Gastrointestinal: Negative for abdominal pain and nausea.   Musculoskeletal: Negative for falls and joint pain.   Skin: Negative for rash.   Neurological: Positive for weakness. Negative for dizziness and focal weakness.   Endo/Heme/Allergies: Does not bruise/bleed easily.        Objective:   /70 (BP Location: Right arm, Patient Position: Sitting)   Pulse 88   Ht 1.803 m (5' 11\")   Wt (!) 127 kg (280 lb)   SpO2 91%   BMI 39.05 kg/m²     Physical Exam   Constitutional: No distress.   HENT:   Mouth/Throat: Oropharynx is clear and moist. No oropharyngeal exudate.   Eyes: No scleral icterus.   Neck: No JVD present.   Cardiovascular: Normal rate and normal heart sounds.  Exam reveals no gallop and no friction rub.    No murmur heard.  Pulmonary/Chest: No respiratory distress. He has no wheezes. He has no rales.   Abdominal: Soft. Bowel sounds are normal.   Musculoskeletal: He exhibits no edema.   Neurological: He is alert.   Skin: No rash noted. He is not diaphoretic.   Psychiatric: He has a normal mood and affect.     We reviewed in person the most recent labs  Recent Results (from the past 4032 hour(s))   MICROALBUMIN CREAT RATIO URINE    Collection Time: 04/08/19  4:01 PM   Result Value Ref Range    Creatinine, Urine 130.00 mg/dL    Microalbumin, Urine Random <0.7 mg/dL    Micro Alb Creat Ratio see below 0 - 30 mg/g   CBC WITH DIFFERENTIAL    Collection Time: 04/08/19  4:01 PM   Result Value Ref Range    WBC 10.5 4.8 - 10.8 K/uL    RBC 5.14 4.70 - 6.10 M/uL    Hemoglobin 16.0 14.0 - 18.0 g/dL    Hematocrit 47.7 42.0 - 52.0 %    MCV 92.8 81.4 - 97.8 fL    MCH 31.1 27.0 - 33.0 pg    MCHC 33.5 (L) 33.7 - 35.3 g/dL    RDW 46.5 35.9 - 50.0 fL    Platelet Count 183 164 - 446 K/uL    MPV 11.5 9.0 - 12.9 fL    Neutrophils-Polys 70.10 44.00 - 72.00 %    Lymphocytes 19.70 (L) 22.00 - 41.00 %    Monocytes 7.90 0.00 - 13.40 % "    Eosinophils 0.70 0.00 - 6.90 %    Basophils 0.90 0.00 - 1.80 %    Immature Granulocytes 0.70 0.00 - 0.90 %    Nucleated RBC 0.00 /100 WBC    Neutrophils (Absolute) 7.39 1.82 - 7.42 K/uL    Lymphs (Absolute) 2.07 1.00 - 4.80 K/uL    Monos (Absolute) 0.83 0.00 - 0.85 K/uL    Eos (Absolute) 0.07 0.00 - 0.51 K/uL    Baso (Absolute) 0.09 0.00 - 0.12 K/uL    Immature Granulocytes (abs) 0.07 0.00 - 0.11 K/uL    NRBC (Absolute) 0.00 K/uL   HEMOGLOBIN A1C    Collection Time: 04/08/19  4:01 PM   Result Value Ref Range    Glycohemoglobin 6.9 (H) 0.0 - 5.6 %    Est Avg Glucose 151 mg/dL   Comp Metabolic Panel    Collection Time: 04/08/19  4:01 PM   Result Value Ref Range    Sodium 137 135 - 145 mmol/L    Potassium 4.2 3.6 - 5.5 mmol/L    Chloride 102 96 - 112 mmol/L    Co2 27 20 - 33 mmol/L    Anion Gap 8.0 0.0 - 11.9    Glucose 163 (H) 65 - 99 mg/dL    Bun 20 8 - 22 mg/dL    Creatinine 0.94 0.50 - 1.40 mg/dL    Calcium 10.2 8.5 - 10.5 mg/dL    AST(SGOT) 28 12 - 45 U/L    ALT(SGPT) 49 2 - 50 U/L    Alkaline Phosphatase 56 30 - 99 U/L    Total Bilirubin 0.8 0.1 - 1.5 mg/dL    Albumin 4.4 3.2 - 4.9 g/dL    Total Protein 6.9 6.0 - 8.2 g/dL    Globulin 2.5 1.9 - 3.5 g/dL    A-G Ratio 1.8 g/dL   PROSTATE SPECIFIC AG SCREENING    Collection Time: 04/08/19  4:01 PM   Result Value Ref Range    Prostatic Specific Antigen Tot 1.31 0.00 - 4.00 ng/mL   TROPONIN    Collection Time: 04/08/19  4:01 PM   Result Value Ref Range    Troponin I <0.01 0.00 - 0.04 ng/mL   CREATINE KINASE    Collection Time: 04/08/19  4:01 PM   Result Value Ref Range    CPK Total 260 (H) 0 - 154 U/L   ESTIMATED GFR    Collection Time: 04/08/19  4:01 PM   Result Value Ref Range    GFR If African American >60 >60 mL/min/1.73 m 2    GFR If Non African American >60 >60 mL/min/1.73 m 2   LIPID PROFILE    Collection Time: 04/09/19  9:48 AM   Result Value Ref Range    Cholesterol,Tot 212 (H) 100 - 199 mg/dL    Triglycerides 228 (H) 0 - 149 mg/dL    HDL 44 >=40 mg/dL      (H) <100 mg/dL       Assessment:     1. Coronary arteriosclerosis due to lipid rich plaque     2. Dyslipidemia  REFERRAL TO LIPID CLINIC   3. Essential hypertension     4. Stented coronary artery     5. Hypertriglyceridemia     6. Elevated CPK         Medical Decision Making:  Today's Assessment / Status / Plan:     It was my pleasure to meet with Mr. Steele.    Given his history and concern for statins with his ongoing elevation of CPK we could look into the affordability of the PCSK9 inhibitors    Blood pressure is well controlled.      We discussed the importance of meaningful weight loss.  I will see Mr. Steele back in 1 year time and encouraged him to follow up with us over the phone or e-mail using my [employer] as issues arise.    It is my pleasure to participate in the care of Mr. Steele.  Please do not hesitate to contact me with questions or concerns.    Raj Olvera MD PhD [facility]  Cardiologist [facility]    Please note that this dictation was created using voice recognition software. I have worked with consultants from the vendor as well as technical experts from [employer] [facility] to optimize the interface. I have made every reasonable attempt to correct obvious errors, but I expect that there are errors of grammar and possibly content I did not discover before finalizing the note.

## 2019-05-24 RX ORDER — LEVOTHYROXINE SODIUM 0.07 MG/1
TABLET ORAL
Qty: 90 TAB | Refills: 3 | Status: SHIPPED | OUTPATIENT
Start: 2019-05-24 | End: 2020-08-25 | Stop reason: SDUPTHER

## 2019-07-06 DIAGNOSIS — I10 ESSENTIAL HYPERTENSION: ICD-10-CM

## 2019-07-08 RX ORDER — CARVEDILOL 12.5 MG/1
12.5 TABLET ORAL 2 TIMES DAILY WITH MEALS
Qty: 180 TAB | Refills: 3 | Status: SHIPPED | OUTPATIENT
Start: 2019-07-08 | End: 2020-07-24 | Stop reason: SDUPTHER

## 2019-07-09 ENCOUNTER — OFFICE VISIT (OUTPATIENT)
Dept: INTERNAL MEDICINE | Facility: IMAGING CENTER | Age: 74
End: 2019-07-09
Payer: MEDICARE

## 2019-07-09 ENCOUNTER — HOSPITAL ENCOUNTER (OUTPATIENT)
Dept: RADIOLOGY | Facility: MEDICAL CENTER | Age: 74
End: 2019-07-09
Attending: FAMILY MEDICINE
Payer: MEDICARE

## 2019-07-09 VITALS
DIASTOLIC BLOOD PRESSURE: 84 MMHG | WEIGHT: 268 LBS | OXYGEN SATURATION: 95 % | BODY MASS INDEX: 37.52 KG/M2 | TEMPERATURE: 97.2 F | HEART RATE: 77 BPM | RESPIRATION RATE: 14 BRPM | HEIGHT: 71 IN | SYSTOLIC BLOOD PRESSURE: 145 MMHG

## 2019-07-09 DIAGNOSIS — M79.605 LEFT LEG PAIN: ICD-10-CM

## 2019-07-09 DIAGNOSIS — S76.112A STRAIN OF LEFT QUADRICEPS, INITIAL ENCOUNTER: ICD-10-CM

## 2019-07-09 DIAGNOSIS — E11.9 TYPE 2 DIABETES MELLITUS WITHOUT COMPLICATION, WITHOUT LONG-TERM CURRENT USE OF INSULIN (HCC): ICD-10-CM

## 2019-07-09 DIAGNOSIS — E78.5 DYSLIPIDEMIA: ICD-10-CM

## 2019-07-09 LAB
HBA1C MFR BLD: 5.9 % (ref 0–5.6)
INT CON NEG: ABNORMAL
INT CON POS: ABNORMAL

## 2019-07-09 PROCEDURE — 99214 OFFICE O/P EST MOD 30 MIN: CPT | Performed by: FAMILY MEDICINE

## 2019-07-09 PROCEDURE — 73502 X-RAY EXAM HIP UNI 2-3 VIEWS: CPT | Mod: LT

## 2019-07-09 PROCEDURE — 83036 HEMOGLOBIN GLYCOSYLATED A1C: CPT | Performed by: FAMILY MEDICINE

## 2019-07-09 PROCEDURE — 73552 X-RAY EXAM OF FEMUR 2/>: CPT | Mod: LT

## 2019-07-09 RX ORDER — MELOXICAM 7.5 MG/1
7.5 TABLET ORAL DAILY
Qty: 30 TAB | Refills: 1 | Status: SHIPPED | OUTPATIENT
Start: 2019-07-09 | End: 2019-09-04 | Stop reason: SDUPTHER

## 2019-07-09 ASSESSMENT — PATIENT HEALTH QUESTIONNAIRE - PHQ9: CLINICAL INTERPRETATION OF PHQ2 SCORE: 0

## 2019-07-09 NOTE — PROGRESS NOTES
Chief Complaint   Patient presents with   • Leg Pain     left anterior thigh       HISTORY OF PRESENT ILLNESS: Patient is a 73 y.o. male established patient who presents today complaining of 3 weeks of left anterior thigh pain. He has been doing yardwork. No specific injury, but pain in quads since. Worse with activities. Hurts to raise leg. No hip pain.no numbness or tingling. No back pain.   Let feels weaker.   He has take advil and muscle relaxant at .     Also follow up diabetes. He has lost some weight. He is following a low carb diet.   A1c is down from 6.9 to 5.9.  He is on an ace and aspirin.  Cannot tolerate statins.   Last time he saw cardiology he was referred to vascular/lipid clinic to start PCSK9 inhibitor but has not made that appointment.         Patient Active Problem List    Diagnosis Date Noted   • Hypertriglyceridemia    • Elevated CPK    • BMI 37.0-37.9, adult 08/31/2018   • Type 2 diabetes mellitus without complication, without long-term current use of insulin ([facility]) 08/31/2018   • Hypothyroid 12/28/2017   • Vascular claudication ([facility]) 11/30/2016   • Neuropathy ([facility]) 11/30/2016   • Essential hypertension 03/19/2016   • Foot drop, bilateral 03/18/2016   • Thyroid activity decreased 09/23/2015   • Constipation 05/04/2015   • Lumbosacral spondylosis without myelopathy 04/29/2015   • DDD (degenerative disc disease), lumbar 12/31/2014   • Spinal stenosis of lumbar region 12/31/2014   • Leukocytosis 09/05/2014   • Obesity 08/28/2014   • Coronary arteriosclerosis due to lipid rich plaque    • Stented coronary artery 08/09/2012   • BPH (benign prostatic hypertrophy) with urinary obstruction 08/09/2012   • Dyslipidemia 08/09/2012     Current Outpatient Prescriptions on File Prior to Visit   Medication Sig Dispense Refill   • carvedilol (COREG) 12.5 MG Tab TAKE 1 TAB BY MOUTH 2 TIMES A DAY, WITH MEALS. 180 Tab 3   • levothyroxine (SYNTHROID) 75 MCG Tab TAKE 1 TABLET BY MOUTH EVERY DAY 90 Tab 3   •  methocarbamol (ROBAXIN) 750 MG Tab Take one by mouth 2-3 times daily prn muscle spasm. 180 Tab 0   • tadalafil (CIALIS) 5 MG tablet Take 1 Tab by mouth every day. 84 Tab 0   • omeprazole (PRILOSEC) 20 MG delayed-release capsule      • lisinopril (PRINIVIL, ZESTRIL) 40 MG tablet TAKE 1 TAB BY MOUTH EVERY DAY. 90 Tab 3   • docusate sodium 100 MG Cap Take 100 mg by mouth 2 Times a Day. 180 Cap 1   • amLODIPine (NORVASC) 5 MG Tab Take 1 Tab by mouth every day. 90 Tab 3   • metFORMIN ER (GLUCOPHAGE XR) 500 MG TABLET SR 24 HR Take 2 Tabs by mouth every day. 180 Tab 3   • FEXOFENADINE HCL PO Take  by mouth.     • Flaxseed, Linseed, (FLAX SEEDS PO) Take  by mouth.     • finasteride (PROSCAR) 5 MG Tab Take 5 mg by mouth every day.     • Coenzyme Q10 (CO Q-10) 100 MG Cap Take 200 mg by mouth every day.     • LevOCARNitine (CARNITINE, L,) Powder 3 g by Does not apply route every day.     • aspirin (ASA) 81 MG Chew Tab chewable tablet Take 243 mg by mouth every day. 100 Tab 11   • triamcinolone acetonide (KENALOG) 0.1 % Cream Apply 1 Application to affected area(s) 2 times a day. 45 g 2     No current facility-administered medications on file prior to visit.          Past medical, surgical, family, and social history is reviewed and updated in Epic chart by me today.   Medications and allergies reviewed and updated in Epic chart by me today.     REVIEW OF SYSTEMS:  GENERAL: No fatigue,  weight loss.  CV:  No chest pain,dyspnea,palpitations or edema.  RESP:  No sob,cough,wheezing or hemoptysis.  GI: No dysphagia, heartburn,abdominal pain, nausea, vomiting, diarrhea or constipation.       No melena, jaundice, bleeding, incontinence or change in bowel habits.  :  No dysuria, polyuria, hematuria, incontinence, or nocturia.  MS:  Left leg pain as above.  NEURO:  No seizures, syncope, paralysis, tremor, or weakness.  SKIN: No new or concerning skin lesions or changes.   PSYCH: Mood fine.    Vitals:    07/09/19 1100   BP: 145/84  "  Pulse: 77   Resp: 14   Temp: 36.2 °C (97.2 °F)   TempSrc: Temporal   SpO2: 95%   Weight: 121.6 kg (268 lb)   Height: 1.803 m (5' 10.98\")     Physical Exam:  Gen: obese male. No acute distress.  Neck:  Supple, no adenopathy or thyromegaly.  Heart:  Regular rate and rhythm.  Normal S1, S2. No murmur, gallop or rub.  Lungs:  Clear, No wheezes,rales or rhonchi.  Extremities:  No edema.  Right thigh with anterior pain with flexion. Mild tenderness medial ant thigh.   Hip with FROM and no complaints of pain. Strength 5/5 and symmetric.   Psych: Mood and affect are appropriate.        Assessment/Plan:  1. Left leg pain . He can remember no specific injury. I suspect a muscle strain. However without known etiology, I recommend xray of hip and femur.  DX-HIP-COMPLETE - UNILATERAL 2+ LEFT    DX-FEMUR-2+ LEFT    REFERRAL TO PHYSICAL THERAPY Reason for Therapy: Eval/Treat/Report   2. Strain of left quadriceps, initial encounter . Meloxicam, PT REFERRAL TO PHYSICAL THERAPY Reason for Therapy: Eval/Treat/Report   3. Type 2 diabetes mellitus without complication, without long-term current use of insulin (HCC) . Improved control. Continue same medications. Counseled re: diet, exercise.  POCT A1C   4. BMI 37.0-37.9, adult .   Obesity.  Counselled re: healthy diet, portion control and regular exercise.       5. Dyslipidemia . Will check on referral.       Follow up 1 month if leg is still bothering him  Follow up 3 mnths on diabetes.      "

## 2019-08-15 RX ORDER — METHOCARBAMOL 750 MG/1
TABLET, FILM COATED ORAL
Qty: 90 TAB | Refills: 1 | Status: SHIPPED | OUTPATIENT
Start: 2019-08-15 | End: 2020-10-19

## 2019-08-17 ENCOUNTER — PATIENT MESSAGE (OUTPATIENT)
Dept: INTERNAL MEDICINE | Facility: IMAGING CENTER | Age: 74
End: 2019-08-17

## 2019-08-17 DIAGNOSIS — N40.1 BENIGN PROSTATIC HYPERPLASIA WITH URINARY OBSTRUCTION: Chronic | ICD-10-CM

## 2019-08-17 DIAGNOSIS — N13.8 BENIGN PROSTATIC HYPERPLASIA WITH URINARY OBSTRUCTION: Chronic | ICD-10-CM

## 2019-08-19 RX ORDER — TADALAFIL 5 MG/1
5 TABLET ORAL DAILY
Qty: 84 TAB | Refills: 0 | Status: SHIPPED | OUTPATIENT
Start: 2019-08-19 | End: 2019-11-22 | Stop reason: SDUPTHER

## 2019-08-19 NOTE — TELEPHONE ENCOUNTER
From: Myke Steele  To: Shelly George M.D.  Sent: 8/17/2019 11:24 AM PDT  Subject: Prescription Question    Hi Dr Sauceda   I need a prescription for    tadalafil 5 MG tablet  Commonly known as: CIALISLearn more  Take 1 tab by mouth every day.  PrescribedMay 16, 2019Approved byAna Maria Ruiz M.D.[id number] TabsNo refills remaining    Can you send it to [facility]  [id number]     I had stem cells injected in knee.  So far so good    Thanks  Myke

## 2019-09-04 RX ORDER — MELOXICAM 7.5 MG/1
7.5 TABLET ORAL DAILY
Qty: 30 TAB | Refills: 1 | Status: SHIPPED | OUTPATIENT
Start: 2019-09-04 | End: 2019-12-05 | Stop reason: SDUPTHER

## 2019-09-05 DIAGNOSIS — K59.00 CONSTIPATION, UNSPECIFIED CONSTIPATION TYPE: ICD-10-CM

## 2019-09-05 RX ORDER — DOCUSATE SODIUM 100 MG/1
CAPSULE, LIQUID FILLED ORAL
Qty: 180 CAP | Refills: 1 | Status: SHIPPED | OUTPATIENT
Start: 2019-09-05

## 2019-09-09 DIAGNOSIS — E11.9 TYPE 2 DIABETES MELLITUS WITHOUT COMPLICATION, WITHOUT LONG-TERM CURRENT USE OF INSULIN (HCC): ICD-10-CM

## 2019-09-09 RX ORDER — METFORMIN HYDROCHLORIDE 500 MG/1
TABLET, EXTENDED RELEASE ORAL
Qty: 180 TAB | Refills: 3 | Status: SHIPPED | OUTPATIENT
Start: 2019-09-09 | End: 2020-09-21 | Stop reason: SDUPTHER

## 2019-09-17 ENCOUNTER — PATIENT MESSAGE (OUTPATIENT)
Dept: INTERNAL MEDICINE | Facility: IMAGING CENTER | Age: 74
End: 2019-09-17

## 2019-09-17 RX ORDER — SCOLOPAMINE TRANSDERMAL SYSTEM 1 MG/1
1 PATCH, EXTENDED RELEASE TRANSDERMAL
Qty: 4 PATCH | Refills: 0 | Status: SHIPPED | OUTPATIENT
Start: 2019-09-17 | End: 2020-10-19

## 2019-09-18 RX ORDER — OMEPRAZOLE 20 MG/1
20 CAPSULE, DELAYED RELEASE ORAL DAILY
Qty: 90 CAP | Refills: 1 | Status: SHIPPED | OUTPATIENT
Start: 2019-09-18 | End: 2020-07-17 | Stop reason: SDUPTHER

## 2019-09-18 NOTE — TELEPHONE ENCOUNTER
From: Myke Steele  To: Shelly George M.D.  Sent: 9/17/2019 5:19 PM PDT  Subject: Prescription Question    Hi Dr George   Can you call in a refill for    omeprazole 20 MG delayed-release capsule  Commonly known as: PRILOSECLearn more  This medication isn't available for refill through [facility] at this time  I usually get a 90 supply    Thanks  Myke

## 2019-10-12 ENCOUNTER — NON-PROVIDER VISIT (OUTPATIENT)
Dept: INTERNAL MEDICINE | Facility: IMAGING CENTER | Age: 74
End: 2019-10-12
Payer: MEDICARE

## 2019-10-12 DIAGNOSIS — Z23 NEED FOR INFLUENZA VACCINATION: ICD-10-CM

## 2019-10-12 PROCEDURE — G0008 ADMIN INFLUENZA VIRUS VAC: HCPCS | Performed by: FAMILY MEDICINE

## 2019-10-12 PROCEDURE — 90662 IIV NO PRSV INCREASED AG IM: CPT | Performed by: FAMILY MEDICINE

## 2019-10-28 ENCOUNTER — OFFICE VISIT (OUTPATIENT)
Dept: DERMATOLOGY | Facility: IMAGING CENTER | Age: 74
End: 2019-10-28
Payer: MEDICARE

## 2019-10-28 DIAGNOSIS — L30.9 DERMATITIS: ICD-10-CM

## 2019-10-28 DIAGNOSIS — D48.5 NEOPLASM OF UNCERTAIN BEHAVIOR OF SKIN: ICD-10-CM

## 2019-10-28 DIAGNOSIS — Z85.828 HISTORY OF BASAL CELL CARCINOMA: ICD-10-CM

## 2019-10-28 DIAGNOSIS — L82.1 SEBORRHEIC KERATOSES: ICD-10-CM

## 2019-10-28 DIAGNOSIS — D22.9 MULTIPLE NEVI: ICD-10-CM

## 2019-10-28 PROCEDURE — 11102 TANGNTL BX SKIN SINGLE LES: CPT | Performed by: DERMATOLOGY

## 2019-10-28 PROCEDURE — 99213 OFFICE O/P EST LOW 20 MIN: CPT | Mod: 25 | Performed by: DERMATOLOGY

## 2019-10-28 RX ORDER — OXYBUTYNIN CHLORIDE 5 MG/1
5 TABLET, EXTENDED RELEASE ORAL
Refills: 3 | COMMUNITY
Start: 2019-10-14 | End: 2020-09-17

## 2019-10-28 RX ORDER — BETAMETHASONE DIPROPIONATE 0.5 MG/G
1 CREAM TOPICAL 2 TIMES DAILY
Qty: 60 G | Refills: 2 | Status: SHIPPED | OUTPATIENT
Start: 2019-10-28 | End: 2023-01-24

## 2019-10-28 ASSESSMENT — ENCOUNTER SYMPTOMS
FEVER: 0
CHILLS: 0

## 2019-10-28 NOTE — PROGRESS NOTES
Dermatology Return Patient Visit    Chief Complaint   Patient presents with   • Follow-Up     5 mos NILSON       Subjective:     HPI:   Myke Steele is a 72 y.o. male presenting for     Follow up NILSON   No new areas of concern today    Eczema on the foot - still with mild, intermittent activity  Uses triamcinolone x 1 week every few weeks, uses urea intermittently, helps, but does not clear  Minimal to no itching    History of skin cancer: yes, BCC 11/13/17 - superficial multifocal, s/p efudex  History of blistering/severe sunburns:No  Family history of skin cancer:No  Family history of atypical moles:No    Current Outpatient Medications on File Prior to Visit   Medication Sig Dispense Refill   • oxybutynin SR (DITROPAN-XL) 5 MG TABLET SR 24 HR Take 5 mg by mouth every day.  3   • omeprazole (PRILOSEC) 20 MG delayed-release capsule Take 1 Cap by mouth every day. 90 Cap 1   • scopolamine (TRANSDERM-SCOP) 1 mg/72hr PATCH 72 HR Apply 1 Patch to skin as directed every 72 hours. 4 Patch 0   • metFORMIN ER (GLUCOPHAGE XR) 500 MG TABLET SR 24 HR TAKE 2 TABLETS BY MOUTH EVERY  Tab 3   • docusate sodium (COLACE) 100 MG Cap TAKE 1 CAP BY MOUTH 2 TIMES A DAY *PAYS CASH* 180 Cap 1   • meloxicam (MOBIC) 7.5 MG Tab TAKE 1 TAB BY MOUTH EVERY DAY. FOR LEG PAIN 30 Tab 1   • tadalafil (CIALIS) 5 MG tablet Take 1 Tab by mouth every day. 84 Tab 0   • methocarbamol (ROBAXIN) 750 MG Tab TAKE 1 TAB BY MOUTH 2 TO 3 TIMES A DAY AS NEEDED FOR MUSCLE SPASM 90 Tab 1   • carvedilol (COREG) 12.5 MG Tab TAKE 1 TAB BY MOUTH 2 TIMES A DAY, WITH MEALS. 180 Tab 3   • levothyroxine (SYNTHROID) 75 MCG Tab TAKE 1 TABLET BY MOUTH EVERY DAY 90 Tab 3   • lisinopril (PRINIVIL, ZESTRIL) 40 MG tablet TAKE 1 TAB BY MOUTH EVERY DAY. 90 Tab 3   • amLODIPine (NORVASC) 5 MG Tab Take 1 Tab by mouth every day. 90 Tab 3   • triamcinolone acetonide (KENALOG) 0.1 % Cream Apply 1 Application to affected area(s) 2 times a day. 45 g 2   • FEXOFENADINE HCL PO  Take  by mouth.     • Flaxseed, Linseed, (FLAX SEEDS PO) Take  by mouth.     • finasteride (PROSCAR) 5 MG Tab Take 5 mg by mouth every day.     • Coenzyme Q10 (CO Q-10) 100 MG Cap Take 200 mg by mouth every day.     • LevOCARNitine (CARNITINE, L,) Powder 3 g by Does not apply route every day.     • aspirin (ASA) 81 MG Chew Tab chewable tablet Take 243 mg by mouth every day. 100 Tab 11     No current facility-administered medications on file prior to visit.        No Known Allergies     Review of Systems   Constitutional: Negative for chills and fever.   Skin: Positive for rash. Negative for itching.        Objective:     A full mucocutaneous exam was completed including: scalp, hair, ears, face, eyelids, conjunctiva, lips, gums/tongue/oropharynx, neck, chest, abdomen, back, left and right upper extremities (including hands/digits and fingernails), left and right lower extremities (including feet/toes, but toenails covered in polish), buttocks, excluding external genitalia (patient refusal) with the following pertinent findings listed below. Remaining above-listed examined areas within normal limits / negative for rashes or lesions.      Physical Exam   Constitutional: He is oriented to person, place, and time and well-developed, well-nourished, and in no distress.   HENT:   Head: Normocephalic and atraumatic.   Right Ear: External ear normal.   Left Ear: External ear normal.   Nose: Nose normal.   Mouth/Throat: Oropharynx is clear and moist.   Eyes: Conjunctivae and lids are normal.   Neck: Normal range of motion. Neck supple.   Cardiovascular: Intact distal pulses.   Pulmonary/Chest: Effort normal.   Neurological: He is alert and oriented to person, place, and time.   Skin: Skin is warm and dry.        Psychiatric: Mood and affect normal.       DATA: none applicable to review    Assessment and Plan:     1. Neoplasm of uncertain behavior of skin  Procedure Note   Procedure: Biopsy by shave technique  Location:  central back  Size: as noted in exam  Preoperative diagnosis:r/o atypical nevus  Risks, benefits and alternatives of procedure discussed, verbal consent obtained for photo (see chart) and written informed consent obtained for procedure. Time out completed. Area of biopsy prepped with alcohol. Anesthesia with 1% lidocaine with epinephrine administered with 30 gauge needle. Shave biopsy of the site performed. Hemostasis achieved with pressure and aluminum chloride. Vaseline applied to wound with bandage. Patient tolerated procedure well and there were no complications. The specimen was sent to the pathology lab by the staff. Wound care was discussed.  - Pathology Specimen; Future    2. Multiple nevi  - Benign-appearing nature of lesions discussed. Advised to return to clinic for any new or concerning changes.    3. Seborrheic keratoses  - Benign-appearing nature of lesions discussed. Advised to return to clinic for any new or concerning changes.    4. History of basal cell carcinoma  Skin cancer education  - discussed importance of sun protective clothing, eyewear  - discussed importance of daily use of broad spectrum sunscreen with SPF 30 or greater, as well as need for reapplication ~every 2 hours when exposed to UVR  - discussed importance of regular self-exams, ideally once per month, every 6-12 months exams in clinic  - ABCDE's of melanoma discussed  - patient to bring any new or concerning lesions to my attention    5. Dermatitis  - instructed to start betamethasone 0.05% ointment to affected area of the foot twice daily until improved. Patient instructed to avoid face, axilla, and groin area. Side effects discussed, including skin thinning, appearance of stretch marks (striae), easy bruising, telangiectasias, and possible increased hair growth       Followup: Return in about 6 months (around 4/28/2020).    Lexy Monson M.D.

## 2019-11-06 ENCOUNTER — TELEPHONE (OUTPATIENT)
Dept: DERMATOLOGY | Facility: IMAGING CENTER | Age: 74
End: 2019-11-06

## 2019-11-22 ENCOUNTER — PATIENT MESSAGE (OUTPATIENT)
Dept: INTERNAL MEDICINE | Facility: IMAGING CENTER | Age: 74
End: 2019-11-22

## 2019-11-22 DIAGNOSIS — N13.8 BENIGN PROSTATIC HYPERPLASIA WITH URINARY OBSTRUCTION: Chronic | ICD-10-CM

## 2019-11-22 DIAGNOSIS — N40.1 BENIGN PROSTATIC HYPERPLASIA WITH URINARY OBSTRUCTION: Chronic | ICD-10-CM

## 2019-11-22 RX ORDER — TADALAFIL 5 MG/1
5 TABLET ORAL DAILY
Qty: 84 TAB | Refills: 1 | Status: SHIPPED | OUTPATIENT
Start: 2019-11-22 | End: 2020-05-26 | Stop reason: SDUPTHER

## 2019-11-23 NOTE — TELEPHONE ENCOUNTER
From: Myke Steele  To: Shelly George M.D.  Sent: 11/22/2019 1:15 PM PST  Subject: Prescription Question    Hi Dr Sauceda,    I need a prescription for    tadalafil 5 MG tablet  Commonly known as: VINNYSWhitney more  Take 1 tab by mouth every day.  [id number] T    I have been refilling this at the [facility] on  [address]

## 2019-12-05 RX ORDER — MELOXICAM 7.5 MG/1
7.5 TABLET ORAL DAILY
Qty: 30 TAB | Refills: 1 | Status: SHIPPED | OUTPATIENT
Start: 2019-12-05 | End: 2020-07-11 | Stop reason: SDUPTHER

## 2019-12-16 ENCOUNTER — PATIENT MESSAGE (OUTPATIENT)
Dept: INTERNAL MEDICINE | Facility: IMAGING CENTER | Age: 74
End: 2019-12-16

## 2019-12-16 RX ORDER — AZITHROMYCIN 250 MG/1
TABLET, FILM COATED ORAL
Qty: 6 TAB | Refills: 0 | Status: SHIPPED | OUTPATIENT
Start: 2019-12-16 | End: 2019-12-31 | Stop reason: SDUPTHER

## 2019-12-16 RX ORDER — AZITHROMYCIN 250 MG/1
TABLET, FILM COATED ORAL
Qty: 6 TAB | Refills: 0 | Status: SHIPPED | OUTPATIENT
Start: 2019-12-16 | End: 2019-12-16 | Stop reason: SDUPTHER

## 2019-12-17 NOTE — TELEPHONE ENCOUNTER
From: Myke Steele  To: Shelly George M.D.  Sent: 12/16/2019 2:05 PM PST  Subject: Non-Urgent Medical Question    Hi Dr George   I’ve had a head cold (no fever) for over a week and it’s not getting better. I’m still congested and have a running nose.    I’ve been taking Mucinex for congestion , drinking plenty of liquids, and resting.  Also taking cough medicine.    Is there anything else I should do ?  Also leaving at end of week so could you prescribe Z packs for Adrienne and me?

## 2019-12-31 RX ORDER — AZITHROMYCIN 250 MG/1
TABLET, FILM COATED ORAL
Qty: 6 TAB | Refills: 0 | Status: SHIPPED | OUTPATIENT
Start: 2019-12-31 | End: 2020-05-07

## 2019-12-31 NOTE — TELEPHONE ENCOUNTER
Sick in [place].  He came into his wife's appointment.  Chest congestion and cough x2 weeks.  Yellow-green sputum.  No fevers or chills.  No shortness of breath.    Zithromax x5 days.

## 2020-01-03 DIAGNOSIS — I10 ESSENTIAL HYPERTENSION: ICD-10-CM

## 2020-01-03 RX ORDER — AMLODIPINE BESYLATE 5 MG/1
5 TABLET ORAL DAILY
Qty: 90 TAB | Refills: 3 | Status: SHIPPED | OUTPATIENT
Start: 2020-01-03 | End: 2021-03-04 | Stop reason: SDUPTHER

## 2020-01-07 ENCOUNTER — PATIENT MESSAGE (OUTPATIENT)
Dept: INTERNAL MEDICINE | Facility: IMAGING CENTER | Age: 75
End: 2020-01-07

## 2020-01-08 RX ORDER — CEFDINIR 300 MG/1
300 CAPSULE ORAL 2 TIMES DAILY
Qty: 14 CAP | Refills: 0 | Status: SHIPPED | OUTPATIENT
Start: 2020-01-08 | End: 2020-05-07

## 2020-01-08 NOTE — TELEPHONE ENCOUNTER
From: Myke Steele  To: Shelly George M.D.  Sent: 1/7/2020 6:25 PM PST  Subject: Non-Urgent Medical Question    Hi Dr George   My head/ sinus are still full of mucus and nose won’t stop draining creating cycles of coughing   Can you put me on medicine you prescribed to Adrienne?  She says it’s slowly working    Thanks  Myke

## 2020-03-13 ENCOUNTER — APPOINTMENT (RX ONLY)
Dept: URBAN - METROPOLITAN AREA CLINIC 4 | Facility: CLINIC | Age: 75
Setting detail: DERMATOLOGY
End: 2020-03-13

## 2020-03-13 DIAGNOSIS — D18.0 HEMANGIOMA: ICD-10-CM

## 2020-03-13 DIAGNOSIS — L81.4 OTHER MELANIN HYPERPIGMENTATION: ICD-10-CM

## 2020-03-13 DIAGNOSIS — D22 MELANOCYTIC NEVI: ICD-10-CM

## 2020-03-13 DIAGNOSIS — L57.0 ACTINIC KERATOSIS: ICD-10-CM

## 2020-03-13 DIAGNOSIS — L82.1 OTHER SEBORRHEIC KERATOSIS: ICD-10-CM

## 2020-03-13 DIAGNOSIS — Z85.828 PERSONAL HISTORY OF OTHER MALIGNANT NEOPLASM OF SKIN: ICD-10-CM

## 2020-03-13 PROBLEM — D22.9 MELANOCYTIC NEVI, UNSPECIFIED: Status: ACTIVE | Noted: 2020-03-13

## 2020-03-13 PROBLEM — D18.01 HEMANGIOMA OF SKIN AND SUBCUTANEOUS TISSUE: Status: ACTIVE | Noted: 2020-03-13

## 2020-03-13 PROBLEM — D22.5 MELANOCYTIC NEVI OF TRUNK: Status: ACTIVE | Noted: 2020-03-13

## 2020-03-13 PROCEDURE — 17000 DESTRUCT PREMALG LESION: CPT

## 2020-03-13 PROCEDURE — 99203 OFFICE O/P NEW LOW 30 MIN: CPT | Mod: 25

## 2020-03-13 PROCEDURE — ? COUNSELING

## 2020-03-13 PROCEDURE — 17003 DESTRUCT PREMALG LES 2-14: CPT

## 2020-03-13 PROCEDURE — ? PHOTO-DOCUMENTATION

## 2020-03-13 PROCEDURE — ? LIQUID NITROGEN

## 2020-03-13 ASSESSMENT — LOCATION SIMPLE DESCRIPTION DERM
LOCATION SIMPLE: LEFT CHEEK
LOCATION SIMPLE: CHEST
LOCATION SIMPLE: ABDOMEN
LOCATION SIMPLE: LEFT UPPER ARM
LOCATION SIMPLE: LEFT EYE

## 2020-03-13 ASSESSMENT — LOCATION DETAILED DESCRIPTION DERM
LOCATION DETAILED: LEFT DISTAL POSTERIOR UPPER ARM
LOCATION DETAILED: LEFT CENTRAL MALAR CHEEK
LOCATION DETAILED: LEFT MEDIAL INFERIOR CHEST
LOCATION DETAILED: EPIGASTRIC SKIN
LOCATION DETAILED: LEFT SUPERIOR CENTRAL MALAR CHEEK
LOCATION DETAILED: LEFT IRIS
LOCATION DETAILED: PERIUMBILICAL SKIN

## 2020-03-13 ASSESSMENT — LOCATION ZONE DERM
LOCATION ZONE: FACE
LOCATION ZONE: TRUNK
LOCATION ZONE: CORNEA
LOCATION ZONE: ARM

## 2020-03-13 NOTE — PROCEDURE: PHOTO-DOCUMENTATION
Detail Level: Detailed
Photo Preface (Leave Blank If You Do Not Want): Photographs were obtained today to monitor.

## 2020-04-25 ENCOUNTER — PATIENT MESSAGE (OUTPATIENT)
Dept: INTERNAL MEDICINE | Facility: IMAGING CENTER | Age: 75
End: 2020-04-25

## 2020-04-25 DIAGNOSIS — I10 ESSENTIAL HYPERTENSION: ICD-10-CM

## 2020-04-25 RX ORDER — LISINOPRIL 40 MG/1
40 TABLET ORAL DAILY
Qty: 90 TAB | Refills: 3 | Status: SHIPPED | OUTPATIENT
Start: 2020-04-25 | End: 2021-04-19 | Stop reason: SDUPTHER

## 2020-04-25 NOTE — TELEPHONE ENCOUNTER
From: Myke Steele  To: Shelly George M.D.  Sent: 4/25/2020 12:31 PM PDT  Subject: Non-Urgent Medical Question    Hi Dr Geogre     Hope you are well  We are doing fine  I need this prescription filled please  I am totally out    lisinopril 40 MG tablet  Commonly known as: PRINIVIL  Learn more  Take 1 tab by mouth every day.  Show detailsHide details  Prescription Details  PrescribedMarch 15, 2019  Approved byShelly George M.D.  Refill Details  [id number] Tabs  Pharmacy Details  [id number] [place] - [address]  [address] [place] [address]  [phone]    Thanks  Myke

## 2020-04-27 RX ORDER — LISINOPRIL 40 MG/1
TABLET ORAL
Qty: 90 TAB | Refills: 3 | OUTPATIENT
Start: 2020-04-27

## 2020-05-07 ENCOUNTER — PATIENT MESSAGE (OUTPATIENT)
Dept: INTERNAL MEDICINE | Facility: IMAGING CENTER | Age: 75
End: 2020-05-07

## 2020-05-07 DIAGNOSIS — M62.838 MUSCLE SPASM: ICD-10-CM

## 2020-05-07 RX ORDER — CYCLOBENZAPRINE HCL 10 MG
10 TABLET ORAL 2 TIMES DAILY PRN
Qty: 40 TAB | Refills: 0 | Status: SHIPPED | OUTPATIENT
Start: 2020-05-07 | End: 2020-06-05

## 2020-05-07 NOTE — TELEPHONE ENCOUNTER
From: Myke Steele  To: Shelly George M.D.  Sent: 5/7/2020 9:09 AM PDT  Subject: Non-Urgent Medical Question    Hi Dr George   Hope you are doing well  I’ve had a stiff neck for 2 days and it won’t go away.  Having a hard time keeping my head in a position where it doesn’t hurt.  Having a hard time sleeping.  The pain is on both sides of my neck towards the back.  Tried heat and ice but doesn’t seem to be working.  Used Voltaren to try to reduce pain but not working  Taking Tylenol 2@500mg 2 times per day.  Suggestions?  Myke   I’ve tried heat and ice

## 2020-05-26 ENCOUNTER — PATIENT MESSAGE (OUTPATIENT)
Dept: INTERNAL MEDICINE | Facility: IMAGING CENTER | Age: 75
End: 2020-05-26

## 2020-05-26 DIAGNOSIS — N40.1 BENIGN PROSTATIC HYPERPLASIA WITH URINARY OBSTRUCTION: Chronic | ICD-10-CM

## 2020-05-26 DIAGNOSIS — N13.8 BENIGN PROSTATIC HYPERPLASIA WITH URINARY OBSTRUCTION: Chronic | ICD-10-CM

## 2020-05-26 RX ORDER — TADALAFIL 5 MG/1
5 TABLET ORAL DAILY
Qty: 84 TAB | Refills: 1 | Status: SHIPPED | OUTPATIENT
Start: 2020-05-26 | End: 2020-05-28

## 2020-05-28 ENCOUNTER — PATIENT MESSAGE (OUTPATIENT)
Dept: INTERNAL MEDICINE | Facility: IMAGING CENTER | Age: 75
End: 2020-05-28

## 2020-05-28 DIAGNOSIS — N40.1 BENIGN PROSTATIC HYPERPLASIA WITH URINARY OBSTRUCTION: Chronic | ICD-10-CM

## 2020-05-28 DIAGNOSIS — N13.8 BENIGN PROSTATIC HYPERPLASIA WITH URINARY OBSTRUCTION: Chronic | ICD-10-CM

## 2020-05-29 RX ORDER — TADALAFIL 5 MG/1
TABLET ORAL
Qty: 84 TAB | Refills: 1 | Status: SHIPPED | OUTPATIENT
Start: 2020-05-29 | End: 2020-12-10 | Stop reason: SDUPTHER

## 2020-05-29 NOTE — TELEPHONE ENCOUNTER
From: Myke Steele  To: Shelly George M.D.  Sent: 5/28/2020 10:15 PM PDT  Subject: Non-Urgent Medical Question    Hi Dr George,  I stopped by at [facility] today to pickup prescription below but they said that they hadn’t received authorization yet.  Could you check if it went out or resend it?    Thanks,  Myke      ----- Message -----   From:Shelly George M.D.   Sent:5/26/2020 3:42 PM PDT   To:Myke Steele   Subject:RE: Non-Urgent Medical Question    Myke, you are all set!  Take care!      ----- Message -----   From:Myke Steele   Sent:5/26/2020 3:36 PM PDT   To:Shelly George M.D.   Subject:Non-Urgent Medical Question    Hi Dr George   Could you please a new prescription for the following because I’m out of refills    tadalafil 5 MG tablet  Commonly known as: CIALIS  Learn more  Take 1 tab by mouth every day.  Show detailsHide details  Prescription Details  PrescribedNovember 22, 2019  Approved byShelly George M.D.  Refill Details  [id number] Tabs  Pharmacy Details  [facility] - [place] - [address]  [address] [address]  [phone]    Thanks for your help    Myke

## 2020-06-05 DIAGNOSIS — M62.838 MUSCLE SPASM: ICD-10-CM

## 2020-06-05 RX ORDER — CYCLOBENZAPRINE HCL 10 MG
TABLET ORAL
Qty: 40 TAB | Refills: 1 | Status: SHIPPED | OUTPATIENT
Start: 2020-06-05 | End: 2020-08-17

## 2020-07-11 ENCOUNTER — PATIENT MESSAGE (OUTPATIENT)
Dept: INTERNAL MEDICINE | Facility: IMAGING CENTER | Age: 75
End: 2020-07-11

## 2020-07-11 RX ORDER — MELOXICAM 7.5 MG/1
7.5 TABLET ORAL DAILY
Qty: 90 TAB | Refills: 3 | Status: SHIPPED | OUTPATIENT
Start: 2020-07-11 | End: 2020-08-23 | Stop reason: SDUPTHER

## 2020-07-11 NOTE — TELEPHONE ENCOUNTER
From: Myke Steele  To: Shelly George M.D.  Sent: 7/11/2020 12:24 PM PDT  Subject: Non-Urgent Medical Question    Hi Dr George  Can you call in a refill for    meloxicam 7.5 MG Tabs  Commonly known as: MOBIC  Learn more  Take 1 tab by mouth every day. for leg pain    Hope you and your family are doing well  Everything is okay here.    I know I’m overdue for my physical and my follow up with Dr Olvera.  Should we be scheduling these things?  Thanks,  Myke

## 2020-07-16 ENCOUNTER — PATIENT MESSAGE (OUTPATIENT)
Dept: INTERNAL MEDICINE | Facility: IMAGING CENTER | Age: 75
End: 2020-07-16

## 2020-07-17 RX ORDER — OMEPRAZOLE 20 MG/1
20 CAPSULE, DELAYED RELEASE ORAL DAILY
Qty: 90 CAP | Refills: 3 | Status: SHIPPED | OUTPATIENT
Start: 2020-07-17 | End: 2021-07-19 | Stop reason: SDUPTHER

## 2020-07-17 NOTE — TELEPHONE ENCOUNTER
From: Myke Steele  To: Shelly George M.D.  Sent: 7/16/2020 6:00 PM PDT  Subject: Non-Urgent Medical Question    Hi Dr George   Could you send it a prescription for me for:    omeprazole 20 MG delayed-release capsule  Commonly known as: PRILOSEC  Learn more  Take 1 cap by mouth every day.  Show detailsHide details  Prescription Details  PrescribedSeptember 18, 2019  Approved byShelly George M.D.  Refill Details  [id number] Caps  Pharmacy Details  [id number] - [place] - [address]  [address] [address]  [phone]    Thanks for your help.  Regards,  Myke

## 2020-07-24 ENCOUNTER — PATIENT MESSAGE (OUTPATIENT)
Dept: INTERNAL MEDICINE | Facility: IMAGING CENTER | Age: 75
End: 2020-07-24

## 2020-07-24 DIAGNOSIS — I10 ESSENTIAL HYPERTENSION: ICD-10-CM

## 2020-07-24 RX ORDER — CARVEDILOL 12.5 MG/1
12.5 TABLET ORAL 2 TIMES DAILY WITH MEALS
Qty: 180 TAB | Refills: 3 | Status: SHIPPED | OUTPATIENT
Start: 2020-07-24 | End: 2021-07-19 | Stop reason: SDUPTHER

## 2020-07-24 NOTE — TELEPHONE ENCOUNTER
From: Myke Steele  To: Shelly George M.D.  Sent: 7/24/2020 9:29 AM PDT  Subject: Non-Urgent Medical Question    Hi Dr George   Can you please call in a prescription for me?    carvedilol 12.5 MG Tabs  Commonly known as: COREG  Learn more  Take 1 tab by mouth 2 times a day, with meals.  Show detailsHide details  Prescription Details  PrescribedJuly 8, 2019  Approved byShelly George M.D.  Refill Details  [id number] Tabs  Pharmacy Details  [id number] [place] - [address]  [address] [place] [address]  [phone]    Thanks  Myke

## 2020-08-17 DIAGNOSIS — M62.838 MUSCLE SPASM: ICD-10-CM

## 2020-08-17 RX ORDER — CYCLOBENZAPRINE HCL 10 MG
TABLET ORAL
Qty: 40 TAB | Refills: 1 | Status: SHIPPED | OUTPATIENT
Start: 2020-08-17 | End: 2023-02-22 | Stop reason: SDUPTHER

## 2020-08-23 ENCOUNTER — PATIENT MESSAGE (OUTPATIENT)
Dept: INTERNAL MEDICINE | Facility: IMAGING CENTER | Age: 75
End: 2020-08-23

## 2020-08-23 RX ORDER — MELOXICAM 7.5 MG/1
7.5 TABLET ORAL 2 TIMES DAILY PRN
Qty: 180 TAB | Refills: 1 | Status: SHIPPED | OUTPATIENT
Start: 2020-08-23 | End: 2021-02-16 | Stop reason: SDUPTHER

## 2020-08-25 ENCOUNTER — PATIENT MESSAGE (OUTPATIENT)
Dept: INTERNAL MEDICINE | Facility: IMAGING CENTER | Age: 75
End: 2020-08-25

## 2020-08-25 RX ORDER — LEVOTHYROXINE SODIUM 0.07 MG/1
75 TABLET ORAL
Qty: 90 TAB | Refills: 3 | Status: SHIPPED | OUTPATIENT
Start: 2020-08-25 | End: 2021-08-18 | Stop reason: SDUPTHER

## 2020-08-25 NOTE — TELEPHONE ENCOUNTER
From: Myke Steele  To: Shelly George M.D.  Sent: 8/25/2020 9:31 AM PDT  Subject: Non-Urgent Medical Question    Hi Dr George     Toe doing much better.  I need refill for     levothyroxine 75 MCG Tabs  Commonly known as: SYNTHROID  Learn more  Take 1 tablet by mouth every day  Show detailsHide details  Prescription Details  PrescribedMay 24, 2019  Approved byShelly George M.D.  Refill Details  [id number] Tabs  Pharmacy Details  [id number] [place] - [address] [place] [address]  [phone]    Thanks  Myke

## 2020-09-04 ENCOUNTER — PATIENT MESSAGE (OUTPATIENT)
Dept: INTERNAL MEDICINE | Facility: IMAGING CENTER | Age: 75
End: 2020-09-04

## 2020-09-04 DIAGNOSIS — R53.83 FATIGUE, UNSPECIFIED TYPE: ICD-10-CM

## 2020-09-04 DIAGNOSIS — I10 ESSENTIAL HYPERTENSION: ICD-10-CM

## 2020-09-04 DIAGNOSIS — Z11.59 NEED FOR HEPATITIS C SCREENING TEST: ICD-10-CM

## 2020-09-04 DIAGNOSIS — E55.9 VITAMIN D DEFICIENCY: ICD-10-CM

## 2020-09-04 DIAGNOSIS — E11.9 TYPE 2 DIABETES MELLITUS WITHOUT COMPLICATION, WITHOUT LONG-TERM CURRENT USE OF INSULIN (HCC): ICD-10-CM

## 2020-09-04 DIAGNOSIS — Z12.5 SCREENING FOR PROSTATE CANCER: ICD-10-CM

## 2020-09-04 DIAGNOSIS — E78.5 DYSLIPIDEMIA: ICD-10-CM

## 2020-09-05 NOTE — TELEPHONE ENCOUNTER
From: Myke Steele  To: Shelly George M.D.  Sent: 9/4/2020 3:00 PM PDT  Subject: Non-Urgent Medical Question    Hi Dr George   I know that I’m overdue on a number of things.  Can we start to schedule what needs to be done?  I know I need blood work for my physical and also for Dr Terry.   I’m behind on physical exam and cardiac exam.  Who knows what else.    Thanks  Myke

## 2020-09-09 ENCOUNTER — NON-PROVIDER VISIT (OUTPATIENT)
Dept: INTERNAL MEDICINE | Facility: IMAGING CENTER | Age: 75
End: 2020-09-09
Payer: MEDICARE

## 2020-09-09 ENCOUNTER — HOSPITAL ENCOUNTER (OUTPATIENT)
Facility: MEDICAL CENTER | Age: 75
End: 2020-09-09
Attending: FAMILY MEDICINE
Payer: MEDICARE

## 2020-09-09 DIAGNOSIS — E55.9 VITAMIN D DEFICIENCY: ICD-10-CM

## 2020-09-09 DIAGNOSIS — E11.9 TYPE 2 DIABETES MELLITUS WITHOUT COMPLICATION, WITHOUT LONG-TERM CURRENT USE OF INSULIN (HCC): ICD-10-CM

## 2020-09-09 DIAGNOSIS — Z12.5 SCREENING FOR PROSTATE CANCER: ICD-10-CM

## 2020-09-09 DIAGNOSIS — R53.83 FATIGUE, UNSPECIFIED TYPE: ICD-10-CM

## 2020-09-09 DIAGNOSIS — Z11.59 NEED FOR HEPATITIS C SCREENING TEST: ICD-10-CM

## 2020-09-09 DIAGNOSIS — E78.5 DYSLIPIDEMIA: ICD-10-CM

## 2020-09-09 DIAGNOSIS — I10 ESSENTIAL HYPERTENSION: ICD-10-CM

## 2020-09-09 LAB
25(OH)D3 SERPL-MCNC: 30 NG/ML (ref 30–100)
ALBUMIN SERPL BCP-MCNC: 4.6 G/DL (ref 3.2–4.9)
ALBUMIN/GLOB SERPL: 2.1 G/DL
ALP SERPL-CCNC: 81 U/L (ref 30–99)
ALT SERPL-CCNC: 46 U/L (ref 2–50)
ANION GAP SERPL CALC-SCNC: 14 MMOL/L (ref 7–16)
AST SERPL-CCNC: 31 U/L (ref 12–45)
BASOPHILS # BLD AUTO: 0.8 % (ref 0–1.8)
BASOPHILS # BLD: 0.09 K/UL (ref 0–0.12)
BILIRUB SERPL-MCNC: 0.6 MG/DL (ref 0.1–1.5)
BUN SERPL-MCNC: 22 MG/DL (ref 8–22)
CALCIUM SERPL-MCNC: 10.4 MG/DL (ref 8.5–10.5)
CHLORIDE SERPL-SCNC: 100 MMOL/L (ref 96–112)
CHOLEST SERPL-MCNC: 222 MG/DL (ref 100–199)
CO2 SERPL-SCNC: 24 MMOL/L (ref 20–33)
CREAT SERPL-MCNC: 1.1 MG/DL (ref 0.5–1.4)
CREAT UR-MCNC: 114.56 MG/DL
EOSINOPHIL # BLD AUTO: 0.14 K/UL (ref 0–0.51)
EOSINOPHIL NFR BLD: 1.2 % (ref 0–6.9)
ERYTHROCYTE [DISTWIDTH] IN BLOOD BY AUTOMATED COUNT: 45.3 FL (ref 35.9–50)
EST. AVERAGE GLUCOSE BLD GHB EST-MCNC: 131 MG/DL
GLOBULIN SER CALC-MCNC: 2.2 G/DL (ref 1.9–3.5)
GLUCOSE SERPL-MCNC: 134 MG/DL (ref 65–99)
HBA1C MFR BLD: 6.2 % (ref 0–5.6)
HCT VFR BLD AUTO: 50 % (ref 42–52)
HCV AB SER QL: NORMAL
HDLC SERPL-MCNC: 47 MG/DL
HGB BLD-MCNC: 16.6 G/DL (ref 14–18)
IMM GRANULOCYTES # BLD AUTO: 0.08 K/UL (ref 0–0.11)
IMM GRANULOCYTES NFR BLD AUTO: 0.7 % (ref 0–0.9)
LDLC SERPL CALC-MCNC: 122 MG/DL
LYMPHOCYTES # BLD AUTO: 2.81 K/UL (ref 1–4.8)
LYMPHOCYTES NFR BLD: 24.9 % (ref 22–41)
MCH RBC QN AUTO: 31 PG (ref 27–33)
MCHC RBC AUTO-ENTMCNC: 33.2 G/DL (ref 33.7–35.3)
MCV RBC AUTO: 93.5 FL (ref 81.4–97.8)
MICROALBUMIN UR-MCNC: <1.2 MG/DL
MICROALBUMIN/CREAT UR: NORMAL MG/G (ref 0–30)
MONOCYTES # BLD AUTO: 1.16 K/UL (ref 0–0.85)
MONOCYTES NFR BLD AUTO: 10.3 % (ref 0–13.4)
NEUTROPHILS # BLD AUTO: 6.99 K/UL (ref 1.82–7.42)
NEUTROPHILS NFR BLD: 62.1 % (ref 44–72)
NRBC # BLD AUTO: 0 K/UL
NRBC BLD-RTO: 0 /100 WBC
PLATELET # BLD AUTO: 154 K/UL (ref 164–446)
PMV BLD AUTO: 11.4 FL (ref 9–12.9)
POTASSIUM SERPL-SCNC: 4.3 MMOL/L (ref 3.6–5.5)
PROT SERPL-MCNC: 6.8 G/DL (ref 6–8.2)
PSA SERPL-MCNC: 6.38 NG/ML (ref 0–4)
RBC # BLD AUTO: 5.35 M/UL (ref 4.7–6.1)
SODIUM SERPL-SCNC: 138 MMOL/L (ref 135–145)
TRIGL SERPL-MCNC: 266 MG/DL (ref 0–149)
TSH SERPL DL<=0.005 MIU/L-ACNC: 2.57 UIU/ML (ref 0.38–5.33)
VIT B12 SERPL-MCNC: 450 PG/ML (ref 211–911)
WBC # BLD AUTO: 11.3 K/UL (ref 4.8–10.8)

## 2020-09-09 PROCEDURE — 85025 COMPLETE CBC W/AUTO DIFF WBC: CPT

## 2020-09-09 PROCEDURE — 82043 UR ALBUMIN QUANTITATIVE: CPT

## 2020-09-09 PROCEDURE — 82306 VITAMIN D 25 HYDROXY: CPT

## 2020-09-09 PROCEDURE — 83036 HEMOGLOBIN GLYCOSYLATED A1C: CPT

## 2020-09-09 PROCEDURE — 84153 ASSAY OF PSA TOTAL: CPT

## 2020-09-09 PROCEDURE — 80053 COMPREHEN METABOLIC PANEL: CPT

## 2020-09-09 PROCEDURE — 80061 LIPID PANEL: CPT

## 2020-09-09 PROCEDURE — 82570 ASSAY OF URINE CREATININE: CPT

## 2020-09-09 PROCEDURE — 86803 HEPATITIS C AB TEST: CPT

## 2020-09-09 PROCEDURE — 84443 ASSAY THYROID STIM HORMONE: CPT

## 2020-09-09 PROCEDURE — 82607 VITAMIN B-12: CPT

## 2020-09-11 ENCOUNTER — APPOINTMENT (RX ONLY)
Dept: URBAN - METROPOLITAN AREA CLINIC 4 | Facility: CLINIC | Age: 75
Setting detail: DERMATOLOGY
End: 2020-09-11

## 2020-09-11 DIAGNOSIS — L81.4 OTHER MELANIN HYPERPIGMENTATION: ICD-10-CM

## 2020-09-11 DIAGNOSIS — Z85.828 PERSONAL HISTORY OF OTHER MALIGNANT NEOPLASM OF SKIN: ICD-10-CM | Status: STABLE

## 2020-09-11 DIAGNOSIS — D485 NEOPLASM OF UNCERTAIN BEHAVIOR OF SKIN: ICD-10-CM

## 2020-09-11 DIAGNOSIS — D22 MELANOCYTIC NEVI: ICD-10-CM

## 2020-09-11 DIAGNOSIS — L82.1 OTHER SEBORRHEIC KERATOSIS: ICD-10-CM

## 2020-09-11 DIAGNOSIS — D18.0 HEMANGIOMA: ICD-10-CM

## 2020-09-11 DIAGNOSIS — L91.8 OTHER HYPERTROPHIC DISORDERS OF THE SKIN: ICD-10-CM

## 2020-09-11 PROBLEM — D48.5 NEOPLASM OF UNCERTAIN BEHAVIOR OF SKIN: Status: ACTIVE | Noted: 2020-09-11

## 2020-09-11 PROBLEM — D18.01 HEMANGIOMA OF SKIN AND SUBCUTANEOUS TISSUE: Status: ACTIVE | Noted: 2020-09-11

## 2020-09-11 PROBLEM — D23.5 OTHER BENIGN NEOPLASM OF SKIN OF TRUNK: Status: ACTIVE | Noted: 2020-09-11

## 2020-09-11 PROBLEM — D22.5 MELANOCYTIC NEVI OF TRUNK: Status: ACTIVE | Noted: 2020-09-11

## 2020-09-11 PROCEDURE — 17110 DESTRUCTION B9 LES UP TO 14: CPT

## 2020-09-11 PROCEDURE — 99213 OFFICE O/P EST LOW 20 MIN: CPT | Mod: 25

## 2020-09-11 PROCEDURE — ? LIQUID NITROGEN

## 2020-09-11 PROCEDURE — ? COUNSELING

## 2020-09-11 PROCEDURE — ? DEFER

## 2020-09-11 ASSESSMENT — LOCATION SIMPLE DESCRIPTION DERM
LOCATION SIMPLE: LEFT UPPER ARM
LOCATION SIMPLE: LEFT FOREARM
LOCATION SIMPLE: CHEST
LOCATION SIMPLE: LEFT POSTERIOR THIGH
LOCATION SIMPLE: ABDOMEN

## 2020-09-11 ASSESSMENT — LOCATION DETAILED DESCRIPTION DERM
LOCATION DETAILED: LEFT LATERAL PROXIMAL UPPER ARM
LOCATION DETAILED: PERIUMBILICAL SKIN
LOCATION DETAILED: LEFT MEDIAL INFERIOR CHEST
LOCATION DETAILED: LEFT PROXIMAL MEDIAL POSTERIOR THIGH
LOCATION DETAILED: LEFT PROXIMAL DORSAL FOREARM
LOCATION DETAILED: EPIGASTRIC SKIN
LOCATION DETAILED: LEFT DISTAL POSTERIOR UPPER ARM

## 2020-09-11 ASSESSMENT — LOCATION ZONE DERM
LOCATION ZONE: ARM
LOCATION ZONE: LEG
LOCATION ZONE: TRUNK

## 2020-09-11 NOTE — PROCEDURE: MIPS QUALITY
Detail Level: Detailed
Quality 130: Documentation Of Current Medications In The Medical Record: Current Medications Documented
Quality 111:Pneumonia Vaccination Status For Older Adults: Pneumococcal Vaccination Previously Received
Quality 431: Preventive Care And Screening: Unhealthy Alcohol Use - Screening: Patient screened for unhealthy alcohol use using a single question and scores less than 2 times per year
Quality 226: Preventive Care And Screening: Tobacco Use: Screening And Cessation Intervention: Patient screened for tobacco use and is an ex/non-smoker

## 2020-09-17 ENCOUNTER — OFFICE VISIT (OUTPATIENT)
Dept: CARDIOLOGY | Facility: MEDICAL CENTER | Age: 75
End: 2020-09-17
Payer: MEDICARE

## 2020-09-17 ENCOUNTER — NON-PROVIDER VISIT (OUTPATIENT)
Dept: INTERNAL MEDICINE | Facility: IMAGING CENTER | Age: 75
End: 2020-09-17
Payer: MEDICARE

## 2020-09-17 ENCOUNTER — HOSPITAL ENCOUNTER (OUTPATIENT)
Facility: MEDICAL CENTER | Age: 75
End: 2020-09-17
Attending: INTERNAL MEDICINE
Payer: MEDICARE

## 2020-09-17 VITALS
OXYGEN SATURATION: 92 % | BODY MASS INDEX: 37.37 KG/M2 | WEIGHT: 267.8 LBS | SYSTOLIC BLOOD PRESSURE: 140 MMHG | HEART RATE: 86 BPM | DIASTOLIC BLOOD PRESSURE: 78 MMHG

## 2020-09-17 DIAGNOSIS — E78.5 DYSLIPIDEMIA: ICD-10-CM

## 2020-09-17 DIAGNOSIS — Z95.5 STENTED CORONARY ARTERY: ICD-10-CM

## 2020-09-17 DIAGNOSIS — R74.8 ELEVATED CPK: ICD-10-CM

## 2020-09-17 DIAGNOSIS — E78.1 HYPERTRIGLYCERIDEMIA: Chronic | ICD-10-CM

## 2020-09-17 DIAGNOSIS — I10 ESSENTIAL HYPERTENSION: ICD-10-CM

## 2020-09-17 DIAGNOSIS — I25.83 CORONARY ATHEROSCLEROSIS DUE TO LIPID RICH PLAQUE: Chronic | ICD-10-CM

## 2020-09-17 DIAGNOSIS — I25.10 CORONARY ATHEROSCLEROSIS DUE TO LIPID RICH PLAQUE: Chronic | ICD-10-CM

## 2020-09-17 DIAGNOSIS — I73.9 VASCULAR CLAUDICATION (HCC): ICD-10-CM

## 2020-09-17 LAB — CK SERPL-CCNC: 455 U/L (ref 0–154)

## 2020-09-17 PROCEDURE — 99214 OFFICE O/P EST MOD 30 MIN: CPT | Performed by: INTERNAL MEDICINE

## 2020-09-17 PROCEDURE — 82550 ASSAY OF CK (CPK): CPT

## 2020-09-17 RX ORDER — OXYBUTYNIN CHLORIDE 10 MG/1
10 TABLET, EXTENDED RELEASE ORAL
COMMUNITY
Start: 2020-07-24 | End: 2021-05-25 | Stop reason: SDUPTHER

## 2020-09-17 ASSESSMENT — ENCOUNTER SYMPTOMS
FALLS: 0
SORE THROAT: 0
BRUISES/BLEEDS EASILY: 0
SHORTNESS OF BREATH: 0
PND: 0
BLURRED VISION: 0
PALPITATIONS: 0
FOCAL WEAKNESS: 0
DIZZINESS: 0
CLAUDICATION: 0
COUGH: 0
WEAKNESS: 1
NAUSEA: 0
CHILLS: 0
FEVER: 0
ABDOMINAL PAIN: 0

## 2020-09-17 ASSESSMENT — FIBROSIS 4 INDEX: FIB4 SCORE: 2.14

## 2020-09-17 NOTE — PROGRESS NOTES
"Chief Complaint   Patient presents with   • Follow-Up     Coronary Arteriosclerosis       Subjective:   Myke Steele is a 74 y.o. male who presents today for follow-up of his history of coronary artery disease status post stenting dyslipidemia with elevated CPKs    His muscle strength is been stable over the last year he follows regularly primary care    Past Medical History:   Diagnosis Date   • Anesthesia 04/07/15    Has never had general anesthesia   • Benign localized hyperplasia of prostate with urinary obstruction and other lower urinary tract symptoms (LUTS)(600.21) 8/9/2012   • Benign prostatic hypertrophy    • Coronary arteriosclerosis due to lipid rich plaque    • Coronary atherosclerosis of native coronary artery 8/9/2012   • Elevated CPK    • Essential hypertriglyceridemia 8/9/2012   • High cholesterol    • Hypercholesterolemia 8/9/2012   • Hypertension    • Hypertriglyceridemia    • Lumbar spinal stenosis    • Obesity 8/28/2014   • Pain 04/07/15    Bilateral lower back, radiating down buttocks=\"aches\" worse with activity   • Stented coronary artery 8/9/2012   • Unspecified cataract     R IOL   • Unspecified disorder of thyroid      Past Surgical History:   Procedure Laterality Date   • LUMBAR LAMINECTOMY DISKECTOMY  4/29/2015    Performed by Juan Pablo [facility]ts M.D. at [facility]   • OTHER CARDIAC SURGERY  2008    2 Cardiac stents   • STAPEDECTOMY      bilateral ears; only worked on the R ear     Family History   Problem Relation Age of Onset   • Heart Failure Father    • Heart Disease Father         valvular   • Hypertension Mother    • Hyperlipidemia Mother    • Hyperlipidemia Brother    • Cancer Brother         skin     Social History     Socioeconomic History   • Marital status:      Spouse name: Not on file   • Number of children: Not on file   • Years of education: Not on file   • Highest education level: Not on file   Occupational History   • Not on file   Social Needs "   • Financial resource strain: Not on file   • Food insecurity     Worry: Not on file     Inability: Not on file   • Transportation needs     Medical: Not on file     Non-medical: Not on file   Tobacco Use   • Smoking status: Never Smoker   • Smokeless tobacco: Never Used   Substance and Sexual Activity   • Alcohol use: Yes     Comment: 2-3/week   • Drug use: No   • Sexual activity: Not on file     Comment: , 2 daughters, retired    Lifestyle   • Physical activity     Days per week: Not on file     Minutes per session: Not on file   • Stress: Not on file   Relationships   • Social connections     Talks on phone: Not on file     Gets together: Not on file     Attends Confucianism service: Not on file     Active member of club or organization: Not on file     Attends meetings of clubs or organizations: Not on file     Relationship status: Not on file   • Intimate partner violence     Fear of current or ex partner: Not on file     Emotionally abused: Not on file     Physically abused: Not on file     Forced sexual activity: Not on file   Other Topics Concern   • Not on file   Social History Narrative   • Not on file     No Known Allergies  Outpatient Encounter Medications as of 9/17/2020   Medication Sig Dispense Refill   • levothyroxine (SYNTHROID) 75 MCG Tab Take 1 Tab by mouth every day. 90 Tab 3   • meloxicam (MOBIC) 7.5 MG Tab Take 1 Tab by mouth 2 times a day as needed for Inflammation for up to 90 days. 180 Tab 1   • cyclobenzaprine (FLEXERIL) 10 MG Tab TAKE 1 TABLET BY MOUTH TWICE A DAY AS NEEDED 40 Tab 1   • carvedilol (COREG) 12.5 MG Tab Take 1 Tab by mouth 2 times a day, with meals. 180 Tab 3   • omeprazole (PRILOSEC) 20 MG delayed-release capsule Take 1 Cap by mouth every day. 90 Cap 3   • tadalafil (CIALIS) 5 MG tablet TAKE ONE TABLET BY MOUTH ONE TIME DAILY  84 Tab 1   • lisinopril (PRINIVIL) 40 MG tablet Take 1 Tab by mouth every day. 90 Tab 3   • amLODIPine (NORVASC) 5 MG Tab Take 1 Tab by mouth  every day. 90 Tab 3   • betamethasone dipropionate (DIPROLENE) 0.05 % Cream Apply 1 Application to affected area(s) 2 times a day. To area on feet as needed 60 g 2   • scopolamine (TRANSDERM-SCOP) 1 mg/72hr PATCH 72 HR Apply 1 Patch to skin as directed every 72 hours. 4 Patch 0   • metFORMIN ER (GLUCOPHAGE XR) 500 MG TABLET SR 24 HR TAKE 2 TABLETS BY MOUTH EVERY  Tab 3   • docusate sodium (COLACE) 100 MG Cap TAKE 1 CAP BY MOUTH 2 TIMES A DAY *PAYS CASH* 180 Cap 1   • methocarbamol (ROBAXIN) 750 MG Tab TAKE 1 TAB BY MOUTH 2 TO 3 TIMES A DAY AS NEEDED FOR MUSCLE SPASM 90 Tab 1   • triamcinolone acetonide (KENALOG) 0.1 % Cream Apply 1 Application to affected area(s) 2 times a day. 45 g 2   • FEXOFENADINE HCL PO Take  by mouth.     • Flaxseed, Linseed, (FLAX SEEDS PO) Take  by mouth.     • finasteride (PROSCAR) 5 MG Tab Take 5 mg by mouth every day.     • Coenzyme Q10 (CO Q-10) 100 MG Cap Take 200 mg by mouth every day.     • LevOCARNitine (CARNITINE, L,) Powder 3 g by Does not apply route every day.     • aspirin (ASA) 81 MG Chew Tab chewable tablet Take 243 mg by mouth every day. 100 Tab 11   • oxybutynin SR (DITROPAN-XL) 10 MG CR tablet Take 10 mg by mouth every day.     • [DISCONTINUED] oxybutynin SR (DITROPAN-XL) 5 MG TABLET SR 24 HR Take 5 mg by mouth every day.  3     No facility-administered encounter medications on file as of 9/17/2020.      Review of Systems   Constitutional: Negative for chills and fever.   HENT: Negative for sore throat.    Eyes: Negative for blurred vision.   Respiratory: Negative for cough and shortness of breath.    Cardiovascular: Negative for chest pain, palpitations, claudication, leg swelling and PND.   Gastrointestinal: Negative for abdominal pain and nausea.   Musculoskeletal: Negative for falls and joint pain.   Skin: Negative for rash.   Neurological: Positive for weakness. Negative for dizziness and focal weakness.   Endo/Heme/Allergies: Does not bruise/bleed easily.         Objective:   /78 (BP Location: Left arm, Patient Position: Sitting, BP Cuff Size: Adult)   Pulse 86   Wt 121.5 kg (267 lb 12.8 oz)   SpO2 92%   BMI 37.37 kg/m²     Physical Exam   Constitutional: No distress.   HENT:   Patient wearing a mask due to COVID precautions   Eyes: No scleral icterus.   Neck: No JVD present.   Cardiovascular: Normal rate and normal heart sounds. Exam reveals no gallop and no friction rub.   No murmur heard.  Pulmonary/Chest: No respiratory distress. He has no wheezes. He has no rales.   Abdominal: Soft. Bowel sounds are normal.   Musculoskeletal:         General: No edema.   Neurological: He is alert.   Skin: No rash noted. He is not diaphoretic.   Psychiatric: He has a normal mood and affect.       Assessment:     1. Coronary arteriosclerosis due to lipid rich plaque  REFERRAL TO LIPID CLINIC   2. Dyslipidemia  REFERRAL TO LIPID CLINIC   3. Essential hypertension     4. Hypertriglyceridemia  REFERRAL TO LIPID CLINIC   5. Stented coronary artery     6. Vascular claudication (HCC)     7. Elevated CPK  CREATINE KINASE       Medical Decision Making:  Today's Assessment / Status / Plan:     It was my pleasure to meet with Mr. Steele.    Blood pressure is well controlled.  We specifically assessed the labs on hypertension treatment    Not on statin with chronic elevated CPK he would be a candidate for PCSK9 inhibitors as well as Vascepa will have him see the lipid clinic and recheck his CPK    I will see Mr. Steele back in 1 year time and encouraged him to follow up with us over the phone or electronically using my MyChart as issues arise.    It is my pleasure to participate in the care of Mr. tSeele.  Please do not hesitate to contact me with questions or concerns.    Raj Olvera MD PhD FACC  Cardiologist [facility]    Please note that this dictation was created using voice recognition software. There may be errors I did not  discover before finalizing the note.

## 2020-09-17 NOTE — PATIENT INSTRUCTIONS
Look into Repatha or Praulent which are injections for cholesterol (PCSK9 inhibitors)    For triglycerides new medication is Vascepa      Work on at least 1.5 hours a week of moderate exercise (typical brisk walking or similar activity)    Please look into the following diets and incorporate them into your diet    LOW SALT DIET   KEEP YOUR SODIUM EQUAL TO CALORIES AND NO MORE THAN DOUBLE THE CALORIES FOR A LOW SALT DIET    Cardiosmart.org - great resource for American College of Cardiology on heart disease prevention and treatment      FOR TREATMENT OR PREVENTION OF CORONARY ARTERY DISEASE  These three programs are approved by Medicare/Insurers for those with heart disease  [facility] - [facility]  Dr. Rodríguez's Program for Reversing Heart Disease - Marco A Palacios's Cardiologist vegetarian-based  [facility] - Ruby-based mind-body Program    This is a commonly referenced Program  Dr Dumont - [place] [facility] [place] (book and documentary) - vegetarian-based      FOR TREATMENT OF BLOOD PRESSURE  DASH DIET - American Heart Association for treatment of HYPERTENSION    FOR TREATMENT OF BAD CHOLESTEROL/FATS  REDUCE PROCESSED SUGAR AS MUCH AS POSSIBLE  INCREASE WHOLE GRAINS/VEGETABLES    Lowering total cholesterol and LDL (bad) cholesterol:  - Eat leaner cuts of meat, or eliminate altogether if possible red meat, and frequently substitute fish or chicken.  - Limit saturated fat to no more than 7-10% of total calories no more than 10 g per day is recommended. Some sources of saturated fat include butter, animal fats, hydrogenated vegetable fats and oils, many desserts, whole milk dairy products.  - Replaced saturated fats with polyunsaturated fats and monounsaturated fats. Foods high in monounsaturated fat include nuts, although well, canola oil, avocados, and olives.  - Limit trans fat (processed foods) and replaced with fresh fruits and vegetables  - Recommend nonfat  dairy products  - Increase substantially the amount of soluble fiber intake (legumes such as beans, fruit, whole grains).  - Consider nutritional supplements: plant sterile spreads such as Benecol, fish oil,  flaxseed oil, omega-3 acids capsules 1000 mg twice a day, or viscous fiber such as Metamucil  - Attain ideal weight and regular exercise (at least 30 minutes per day of walking)    Lowering triglycerides:  - Reduce intake of simple sugar: Desserts, candy, pastries, honey, sodas, sugared cereals, yogurt, Gatorade, sports bars, canned fruit, smoothies, fruit juice, coffee drinks  - Reduced intake of refined starches: Refined Pasta  - Reduce or abstain from alcohol  - Increase omega-3 fatty acids: [place], Trout, Mackerel, Herring, Albacore tuna and supplements  - Attain ideal weight and regular exercise (at least 30 minutes per day of walking)      Elevating HDL (good) cholesterol:  - Increase physical activity  - Seasoned foods with garlic and onions  - Increase omega-3 fatty acids and supplements as listed above  - Incorporating appropriate amounts of monounsaturated fats such as nuts, olive oil, canola oil, avocados, olives  - Stop smoking  - Attain ideal weight and regular exercise (at least 30 minutes per day of walking)

## 2020-09-20 ENCOUNTER — PATIENT MESSAGE (OUTPATIENT)
Dept: INTERNAL MEDICINE | Facility: IMAGING CENTER | Age: 75
End: 2020-09-20

## 2020-09-20 DIAGNOSIS — E11.9 TYPE 2 DIABETES MELLITUS WITHOUT COMPLICATION, WITHOUT LONG-TERM CURRENT USE OF INSULIN (HCC): ICD-10-CM

## 2020-09-21 ENCOUNTER — TELEPHONE (OUTPATIENT)
Dept: CARDIOLOGY | Facility: MEDICAL CENTER | Age: 75
End: 2020-09-21

## 2020-09-21 RX ORDER — METFORMIN HYDROCHLORIDE 500 MG/1
1000 TABLET, EXTENDED RELEASE ORAL DAILY
Qty: 180 TAB | Refills: 3 | Status: SHIPPED | OUTPATIENT
Start: 2020-09-21 | End: 2021-09-10 | Stop reason: SDUPTHER

## 2020-09-21 NOTE — TELEPHONE ENCOUNTER
Raj Olvera M.D.  MARY Kwong look okay CPK is up mildly again so acceptable, please let him know     Thank you        See [place] message 09/21/20

## 2020-09-21 NOTE — TELEPHONE ENCOUNTER
From: Myke Steele  To: Shelly George M.D.  Sent: 9/20/2020 9:07 PM PDT  Subject: Non-Urgent Medical Question    Hi Dr George  I just realized that I’m out of refills for    metFORMIN  MG Tb24  Commonly known as: GLUCOPHAGE XR  Learn more  Take 2 tablets by mouth every day  Show detailsHide details  Prescription Details  PrescribedSeptember 9, 2019  Approved byShelly George M.D.  Refill Details  [id number] Tabs  Pharmacy Details  [facility]/pharmacy #[id number] - [place] - [address]  [address] [address]  [phone]    Can you call it in or should I ask Anaya?    Myke

## 2020-10-05 ENCOUNTER — NON-PROVIDER VISIT (OUTPATIENT)
Dept: INTERNAL MEDICINE | Facility: IMAGING CENTER | Age: 75
End: 2020-10-05
Payer: MEDICARE

## 2020-10-05 DIAGNOSIS — Z23 NEED FOR VACCINATION: ICD-10-CM

## 2020-10-05 PROCEDURE — 90662 IIV NO PRSV INCREASED AG IM: CPT | Performed by: FAMILY MEDICINE

## 2020-10-05 PROCEDURE — G0008 ADMIN INFLUENZA VIRUS VAC: HCPCS | Performed by: FAMILY MEDICINE

## 2020-10-19 ENCOUNTER — OFFICE VISIT (OUTPATIENT)
Dept: INTERNAL MEDICINE | Facility: IMAGING CENTER | Age: 75
End: 2020-10-19
Payer: MEDICARE

## 2020-10-19 VITALS
DIASTOLIC BLOOD PRESSURE: 75 MMHG | HEIGHT: 71 IN | BODY MASS INDEX: 37.52 KG/M2 | RESPIRATION RATE: 14 BRPM | SYSTOLIC BLOOD PRESSURE: 122 MMHG | HEART RATE: 83 BPM | OXYGEN SATURATION: 94 % | WEIGHT: 268 LBS | TEMPERATURE: 98.1 F

## 2020-10-19 DIAGNOSIS — E11.9 TYPE 2 DIABETES MELLITUS WITHOUT COMPLICATION, WITHOUT LONG-TERM CURRENT USE OF INSULIN (HCC): ICD-10-CM

## 2020-10-19 DIAGNOSIS — E03.9 HYPOTHYROIDISM, UNSPECIFIED TYPE: Chronic | ICD-10-CM

## 2020-10-19 DIAGNOSIS — I25.83 CORONARY ATHEROSCLEROSIS DUE TO LIPID RICH PLAQUE: Chronic | ICD-10-CM

## 2020-10-19 DIAGNOSIS — E78.5 DYSLIPIDEMIA: ICD-10-CM

## 2020-10-19 DIAGNOSIS — N40.1 BENIGN PROSTATIC HYPERPLASIA WITH URINARY OBSTRUCTION: Chronic | ICD-10-CM

## 2020-10-19 DIAGNOSIS — I10 ESSENTIAL HYPERTENSION: ICD-10-CM

## 2020-10-19 DIAGNOSIS — N13.8 BENIGN PROSTATIC HYPERPLASIA WITH URINARY OBSTRUCTION: Chronic | ICD-10-CM

## 2020-10-19 DIAGNOSIS — I25.10 CORONARY ATHEROSCLEROSIS DUE TO LIPID RICH PLAQUE: Chronic | ICD-10-CM

## 2020-10-19 DIAGNOSIS — Z00.00 MEDICARE ANNUAL WELLNESS VISIT, SUBSEQUENT: ICD-10-CM

## 2020-10-19 DIAGNOSIS — R74.8 ELEVATED CPK: Chronic | ICD-10-CM

## 2020-10-19 PROCEDURE — G0439 PPPS, SUBSEQ VISIT: HCPCS | Performed by: FAMILY MEDICINE

## 2020-10-19 RX ORDER — AZITHROMYCIN 250 MG/1
TABLET, FILM COATED ORAL
Qty: 6 TAB | Refills: 1 | Status: SHIPPED | OUTPATIENT
Start: 2020-10-19 | End: 2021-06-15

## 2020-10-19 ASSESSMENT — ACTIVITIES OF DAILY LIVING (ADL): BATHING_REQUIRES_ASSISTANCE: 0

## 2020-10-19 ASSESSMENT — FIBROSIS 4 INDEX: FIB4 SCORE: 2.14

## 2020-10-19 ASSESSMENT — PATIENT HEALTH QUESTIONNAIRE - PHQ9: CLINICAL INTERPRETATION OF PHQ2 SCORE: 0

## 2020-10-19 ASSESSMENT — ENCOUNTER SYMPTOMS: GENERAL WELL-BEING: GOOD

## 2020-10-19 NOTE — PROGRESS NOTES
Chief Complaint   Patient presents with   • Medicare Annual Wellness         HPI:  Myke is a 74 y.o. established male patient here for Medicare Annual Wellness Visit   He is generally feeling well.  He does have some minor complaints.  He has some arthritic complaints and also some muscle weakness in his lower extremities.  He does have an elevated CPK which has been elevated for over 5 years now.  Evaluation has not demonstrated an etiology.  For this reason that he is off of all statins.    He does have coronary artery disease, status post stent and is followed by Dr. Olvera.  His lipids do remain elevated with an LDL of 122.  He has been referred to lipid clinic for further treatment options.  He denies any chest pain, shortness of breath or edema.    He has type 2 diabetes.  His most recent A1c is improved at 6.2.  He has mild tingling in his distal toes.  He does need to see his eye doctor.        Patient Active Problem List    Diagnosis Date Noted   • Hypertriglyceridemia    • Elevated CPK    • BMI 37.0-37.9, adult 08/31/2018   • Type 2 diabetes mellitus without complication, without long-term current use of insulin ([facility]) 08/31/2018   • Hypothyroid 12/28/2017   • Vascular claudication ([facility]) 11/30/2016   • Neuropathy 11/30/2016   • Essential hypertension 03/19/2016   • Foot drop, bilateral 03/18/2016   • Thyroid activity decreased 09/23/2015   • Constipation 05/04/2015   • Lumbosacral spondylosis without myelopathy 04/29/2015   • DDD (degenerative disc disease), lumbar 12/31/2014   • Spinal stenosis of lumbar region 12/31/2014   • Leukocytosis 09/05/2014   • Obesity 08/28/2014   • Coronary arteriosclerosis due to lipid rich plaque    • Stented coronary artery 08/09/2012   • BPH (benign prostatic hypertrophy) with urinary obstruction 08/09/2012   • Dyslipidemia 08/09/2012       Current Outpatient Medications   Medication Sig Dispense Refill   • metFORMIN ER (GLUCOPHAGE XR) 500 MG TABLET SR 24 HR Take 2 Tabs  by mouth every day. 180 Tab 3   • oxybutynin SR (DITROPAN-XL) 10 MG CR tablet Take 10 mg by mouth every day.     • levothyroxine (SYNTHROID) 75 MCG Tab Take 1 Tab by mouth every day. 90 Tab 3   • meloxicam (MOBIC) 7.5 MG Tab Take 1 Tab by mouth 2 times a day as needed for Inflammation for up to 90 days. 180 Tab 1   • cyclobenzaprine (FLEXERIL) 10 MG Tab TAKE 1 TABLET BY MOUTH TWICE A DAY AS NEEDED 40 Tab 1   • carvedilol (COREG) 12.5 MG Tab Take 1 Tab by mouth 2 times a day, with meals. 180 Tab 3   • omeprazole (PRILOSEC) 20 MG delayed-release capsule Take 1 Cap by mouth every day. 90 Cap 3   • tadalafil (CIALIS) 5 MG tablet TAKE ONE TABLET BY MOUTH ONE TIME DAILY  84 Tab 1   • lisinopril (PRINIVIL) 40 MG tablet Take 1 Tab by mouth every day. 90 Tab 3   • amLODIPine (NORVASC) 5 MG Tab Take 1 Tab by mouth every day. 90 Tab 3   • FEXOFENADINE HCL PO Take  by mouth.     • aspirin (ASA) 81 MG Chew Tab chewable tablet Take 162 mg by mouth every day. 100 Tab 11   • betamethasone dipropionate (DIPROLENE) 0.05 % Cream Apply 1 Application to affected area(s) 2 times a day. To area on feet as needed 60 g 2   • docusate sodium (COLACE) 100 MG Cap TAKE 1 CAP BY MOUTH 2 TIMES A DAY *PAYS CASH* 180 Cap 1   • triamcinolone acetonide (KENALOG) 0.1 % Cream Apply 1 Application to affected area(s) 2 times a day. 45 g 2   • Flaxseed, Linseed, (FLAX SEEDS PO) Take  by mouth.     • Coenzyme Q10 (CO Q-10) 100 MG Cap Take 200 mg by mouth every day.     • LevOCARNitine (CARNITINE, L,) Powder 3 g by Does not apply route every day.       No current facility-administered medications for this visit.         The patient reports adherence to this regimen   Current supplements as per medication list.   Chronic narcotic pain medicines: no     Allergies: Atorvastatin    Current social contact/activities: Active with family and friends.  Exercise: Yes  Is patient current with immunizations?  No, encouraged shingles vaccine.      He  reports that he  has never smoked. He has never used smokeless tobacco. He reports current alcohol use. He reports that he does not use drugs.        DPA/Advanced directive: .  He does have advanced directive.  Encouraged him to bring copy in    ROS:    Gait: Uses a cane, intermittently  Ostomy: no   Other tubes: no   Amputations: no   Chronic oxygen use no   Last eye exam: Over 12 months ago  : Denies incontinence.       Screening:    POLST/AD    Does the patient have a POLST or Advanced Directive? He does have advanced directive.   Depression Screening    Little interest or pleasure in doing things?  0 - not at all  Feeling down, depressed , or hopeless? 0 - not at all  Patient Health Questionnaire Score: 0     If depressive symptoms identified deferred to follow up visit unless specifically addressed in assessment and plan.    Interpretation of PHQ-9 Total Score   Score Severity   1-4 No Depression   5-9 Mild Depression   10-14 Moderate Depression   15-19 Moderately Severe Depression   20-27 Severe Depression    Screening for Cognitive Impairment    Three Minute Recall (river, nation, finger) 3/3    Edwin clock face with all 12 numbers and set the hands to show 10 past 11.  Yes    Cognitive concerns identified deferred for follow up unless specifically addressed in assessment and plan.    Fall Risk Assessment    Has the patient had two or more falls in the last year or any fall with injury in the last year?  No    Safety Assessment    Throw rugs on floor.  Yes  Handrails on all stairs.  Yes  Good lighting in all hallways.  Yes  Difficulty hearing.  No  Patient counseled about all safety risks that were identified.    Functional Assessment ADLs    Are there any barriers preventing you from cooking for yourself or meeting nutritional needs?  No.    Are there any barriers preventing you from driving safely or obtaining transportation?  No.    Are there any barriers preventing you from using a telephone or calling for help?  No.     Are there any barriers preventing you from shopping?  No.    Are there any barriers preventing you from taking care of your own finances?  No.    Are there any barriers preventing you from managing your medications?  No.    Are there any barriers preventing you from showering, bathing or dressing yourself?  No.    Are you currently engaging in any exercise or physical activity?  Yes.     What is your perception of your health?  Good.          Patient Care Team:  Shelly George M.D. as PCP - General (Family Medicine)  Anaya Figueroa R.N. as Registered Nurse  Neurology:  Dr. Leavitt  Urology:  Dr. Rocha  Card:  Dr. Olvera  PT:  [facility]      Social History     Tobacco Use   • Smoking status: Never Smoker   • Smokeless tobacco: Never Used   Substance Use Topics   • Alcohol use: Yes     Comment: 2-3/week   • Drug use: No     Family History   Problem Relation Age of Onset   • Heart Failure Father    • Heart Disease Father         valvular   • Hypertension Mother    • Hyperlipidemia Mother    • Hyperlipidemia Brother    • Cancer Brother         skin     He  has a past medical history of Anesthesia (04/07/15), Benign localized hyperplasia of prostate with urinary obstruction and other lower urinary tract symptoms (LUTS)(600.21) (8/9/2012), Benign prostatic hypertrophy, Coronary arteriosclerosis due to lipid rich plaque, Coronary atherosclerosis of native coronary artery (8/9/2012), Elevated CPK, Essential hypertriglyceridemia (8/9/2012), High cholesterol, Hypercholesterolemia (8/9/2012), Hypertension, Hypertriglyceridemia, Lumbar spinal stenosis, Obesity (8/28/2014), Pain (04/07/15), Stented coronary artery (8/9/2012), Unspecified cataract, and Unspecified disorder of thyroid. He also has no past medical history of Cold or Dental disorder.   Past Surgical History:   Procedure Laterality Date   • LUMBAR LAMINECTOMY DISKECTOMY  4/29/2015    Performed by Juan Pablo Betts M.D. at [facility]   • OTHER  "CARDIAC SURGERY  2008    2 Cardiac stents   • STAPEDECTOMY      bilateral ears; only worked on the R ear     REVIEW OF SYSTEMS:  GENERAL: No fatigue, no weight loss.  HEENT:  Ears--no earache, no change in hearing, no dizziness, no tinnitus.                 Eyes--no blurred vision, no discharge or pain                 Throat--No sore throat, no dysphagia, no hoarseness  CV:  No chest pain,dyspnea,palpitations or edema.  RESP:  No sob,cough,wheezing or hemoptysis.  GI: No dysphagia, heartburn,abdominal pain, nausea, vomiting, diarrhea. Rare constipation--will take a stool softener       No melena, jaundice, bleeding, incontinence or change in bowel habits.  :  No dysuria, polyuria, hematuria, incontinence, or nocturia.  MS:  Myalgias legs.  NEURO:  No seizures, syncope, paralysis, tremor, or weakness.  SKIN: No new or concerning skin lesions or changes.   PSYCH: Mood fine.        Exam:     /75   Pulse 83   Temp 36.7 °C (98.1 °F) (Temporal)   Resp 14   Ht 1.803 m (5' 11\")   Wt 121.6 kg (268 lb)   SpO2 94%  Body mass index is 37.38 kg/m².    Hearing good.    Dentition good  Alert, oriented in no acute distress.  Eye contact is good, speech goal directed, affect calm  PHYSICAL EXAM;  GENERAL;  Overweight male, No acute distress.   HEENT:  Head normocephalic and atraumatic.    PERRLA, EOMI. No conjunctival injection, no icterus.     TM's normal, nasal mucosa and mouth with no abnormalities.    Oropharynx is clear with no lesions.  NECK: Supple, no adenopathy or thyromegaly.  CV: RR. Normal S1,S2. No murmur, gallop or rub.          No JVD, Carotid pulses 2+ and sym. No bruits.  LUNGS:  Clear, no wheezes, rales or rhonchi.  ABDOMEN: Soft, NT, nondistended. Bowel sounds normal. No hepatospenomegaly or masses. No rebound or guarding. No hernias  EXTREMITIES:  No edema.   Feet are examined. Skin is intact and in good condition. Pulses are 2+ and symmetric. Sensation to light tough is intact bilaterally and " symmetric.   Monofilament exam is normal bilaterally.   NEURO:  CN II-XII intact. Motor and sensation grossly intact.   SKIN: No rashes or abnormal lesions.    Lab Results   Component Value Date/Time    CHOLSTRLTOT 222 (H) 09/09/2020 08:30 AM     (H) 09/09/2020 08:30 AM    HDL 47 09/09/2020 08:30 AM    TRIGLYCERIDE 266 (H) 09/09/2020 08:30 AM       Lab Results   Component Value Date/Time    SODIUM 138 09/09/2020 08:30 AM    POTASSIUM 4.3 09/09/2020 08:30 AM    CHLORIDE 100 09/09/2020 08:30 AM    CO2 24 09/09/2020 08:30 AM    GLUCOSE 134 (H) 09/09/2020 08:30 AM    BUN 22 09/09/2020 08:30 AM    CREATININE 1.10 09/09/2020 08:30 AM     Lab Results   Component Value Date/Time    ALKPHOSPHAT 81 09/09/2020 08:30 AM    ASTSGOT 31 09/09/2020 08:30 AM    ALTSGPT 46 09/09/2020 08:30 AM    TBILIRUBIN 0.6 09/09/2020 08:30 AM        Lab Results   Component Value Date/Time    WBC 11.3 (H) 09/09/2020 08:30 AM    RBC 5.35 09/09/2020 08:30 AM    HEMOGLOBIN 16.6 09/09/2020 08:30 AM    HEMATOCRIT 50.0 09/09/2020 08:30 AM    MCV 93.5 09/09/2020 08:30 AM    MCH 31.0 09/09/2020 08:30 AM    MCHC 33.2 (L) 09/09/2020 08:30 AM    MPV 11.4 09/09/2020 08:30 AM    NEUTSPOLYS 62.10 09/09/2020 08:30 AM    LYMPHOCYTES 24.90 09/09/2020 08:30 AM    MONOCYTES 10.30 09/09/2020 08:30 AM    EOSINOPHILS 1.20 09/09/2020 08:30 AM    BASOPHILS 0.80 09/09/2020 08:30 AM        A1c = 6.2        Assessment and Plan. The following treatment and monitoring plan is recommended:    1. Medicare annual wellness visit, subsequent.  Following diagnosis are addressed    2.   BMI 37.  Counseled regarding diet, exercise. Patient identified as having weight management issue.  Appropriate orders and counseling given.   3. Type 2 diabetes mellitus without complication, without long-term current use of insulin (HCC).  Counseled regarding diet, exercise.  Continue Metformin.  Counseled regarding foot and eye care Diabetic Monofilament Lower Extremity Exam   4.  Hypothyroidism, unspecified type.  Continue present replacement therapy    5. Coronary arteriosclerosis due to lipid rich plaque.  Reviewed diet, exercise    6. Dyslipidemia.  He will be seen in lipid clinic.  Consider PCSK9 inhibitor treatment.    7. Elevated CPK.  This has gone up a little.  It has waxed and waned we will continue to monitor.  He is not a candidate for statins    8. Essential hypertension.  Controlled.  Continue same    9. BPH (benign prostatic hypertrophy) with urinary obstruction.  His PSA is elevated and he does have a follow-up appointment with urology.    10. Healthcare Maintenance. Counseled re: nutrition, activity and safety. Reviewed immunizations.       Services needed: No services needed at this time  Health Care Screening recommendations as per orders if indicated.  Referrals offered: PT/OT/Nutrition counseling/Behavioral Health/Smoking cessation as per orders if indicated.    Discussion today about general wellness and lifestyle habits:    · Prevent falls and reduce trip hazards; Cautioned about securing or removing rugs.  · Have a working fire alarm and carbon monoxide detector;   · Engage in regular physical activity and social activities   ·       Follow-up: 6 months

## 2020-10-26 ENCOUNTER — NON-PROVIDER VISIT (OUTPATIENT)
Dept: VASCULAR LAB | Facility: MEDICAL CENTER | Age: 75
End: 2020-10-26
Attending: INTERNAL MEDICINE
Payer: MEDICARE

## 2020-10-26 DIAGNOSIS — E78.5 DYSLIPIDEMIA: ICD-10-CM

## 2020-10-26 PROCEDURE — 99212 OFFICE O/P EST SF 10 MIN: CPT

## 2020-10-26 RX ORDER — ALIROCUMAB 75 MG/ML
0.75 INJECTION, SOLUTION SUBCUTANEOUS
Qty: 6 ML | Refills: 1 | Status: SHIPPED | OUTPATIENT
Start: 2020-10-26 | End: 2021-04-16 | Stop reason: SDUPTHER

## 2020-10-26 NOTE — NON-PROVIDER
[facility] - Initial Visit  Date of Service: 10/26/20    Myke Steele has been referred for evaluation and management of dyslipidemia    Referral Source: Raj Olvera M.D.     HPI  History of ASCVD: Yes, Details: Patient has PMH positive for CAD s/p stent in 2012  Other Established (non-atherosclerotic) Vascular Disease, if Present: Hypertension, Type 2 Diabetes  Age at Initial Diagnosis of Dyslipidemia: unknown    Current Prescription Lipid Lowering Medications - including dose:   Statin: None  Non-Statin: None   Current Lipid Lowering and Related Supplements:   None  Any Current Side Effects Potentially Related to Lipid Lowering therapy?   Yes, Details: Patient has extensive history of statin trials, but has experienced muscle pain, weakness, and elevated CPK as a result of statin therapy  Current Adherence to Lipid Lowering Therapies   Complete  Previously Attempted Interventions for Lipids - including outcome  Statin: Atorvastatin, Simvastatin, Rosuvastatin    Outcome: Muscle pains, weakness, and elevated CPK  Non-Statin: None   Outcome: N/A  Any Previous History of Statin Intolerance?   Yes, Details: Patient has 5 year history of elevated CPK of unknown etiology. Unable to tolerate three different statin trials.  Baseline Lipids Prior to Treatment:   Shown here:  LDL-C: 122  nonHDL-C: 175  HDL-C: 47  Trigs: 266 Date: 9/9/2020  Other Pertinent History: n/a  History of other CV risk factors: Hypertension, type 2 diabetes    PAST MEDICAL HISTORY:  has a past medical history of Anesthesia (04/07/15), Benign localized hyperplasia of prostate with urinary obstruction and other lower urinary tract symptoms (LUTS)(600.21) (8/9/2012), Benign prostatic hypertrophy, Coronary arteriosclerosis due to lipid rich plaque, Coronary atherosclerosis of native coronary artery (8/9/2012), Elevated CPK, Essential hypertriglyceridemia (8/9/2012), High cholesterol, Hypercholesterolemia (8/9/2012),  Hypertension, Hypertriglyceridemia, Lumbar spinal stenosis, Obesity (8/28/2014), Pain (04/07/15), Stented coronary artery (8/9/2012), Unspecified cataract, and Unspecified disorder of thyroid. He also has no past medical history of Cold or Dental disorder.    PAST SURGICAL HISTORY:  has a past surgical history that includes stapedectomy; other cardiac surgery (2008); and lumbar laminectomy diskectomy (4/29/2015).    CURRENT MEDICATIONS:   Current Outpatient Medications:   •  azithromycin, Take 2 po today and then 1 daily until gone  •  metFORMIN ER, 1,000 mg, Oral, DAILY  •  oxybutynin SR, 10 mg, Oral, QDAY  •  levothyroxine, 75 mcg, Oral, QDAY  •  meloxicam, 7.5 mg, Oral, BID PRN  •  cyclobenzaprine, TAKE 1 TABLET BY MOUTH TWICE A DAY AS NEEDED  •  carvedilol, 12.5 mg, Oral, BID WITH MEALS  •  omeprazole, 20 mg, Oral, DAILY  •  tadalafil, TAKE ONE TABLET BY MOUTH ONE TIME DAILY   •  lisinopril, 40 mg, Oral, DAILY  •  amLODIPine, 5 mg, Oral, DAILY  •  betamethasone dipropionate, 1 Application, Topical, BID  •  docusate sodium, TAKE 1 CAP BY MOUTH 2 TIMES A DAY *PAYS CASH*  •  triamcinolone acetonide, 1 Application, Topical, BID  •  FEXOFENADINE HCL PO, Take  by mouth.  •  Flaxseed, Linseed, (FLAX SEEDS PO), Take  by mouth.  •  Co Q-10, 200 mg, Oral, DAILY  •  Carnitine (L), 3 g, Does not apply, DAILY  •  aspirin, 162 mg, Oral, DAILY    ALLERGIES: Atorvastatin    FAMILY HISTORY: positive for heart failure (father), valvular heart disease (father), hypertension (mother), hyperlipidemia (mother), hyperlipidemia (brother), and skin cancer (brother).    SOCIAL HISTORY   Social History     Tobacco Use   Smoking Status Never Smoker   Smokeless Tobacco Never Used     Change in weight: Stable  Exercise habits: minimal exercise  Diet: common adult    ROS  Physical Exam    DATA REVIEW:  Most Recent Lipid Panel:   Lab Results   Component Value Date    CHOLSTRLTOT 222 (H) 09/09/2020    TRIGLYCERIDE 266 (H) 09/09/2020    HDL 47  09/09/2020     (H) 09/09/2020       Other Pertinent Blood Work:   Lab Results   Component Value Date    SODIUM 138 09/09/2020    POTASSIUM 4.3 09/09/2020    CHLORIDE 100 09/09/2020    CO2 24 09/09/2020    ANION 14.0 09/09/2020    GLUCOSE 134 (H) 09/09/2020    BUN 22 09/09/2020    CREATININE 1.10 09/09/2020    CALCIUM 10.4 09/09/2020    ASTSGOT 31 09/09/2020    ALTSGPT 46 09/09/2020    ALKPHOSPHAT 81 09/09/2020    TBILIRUBIN 0.6 09/09/2020    ALBUMIN 4.6 09/09/2020    AGRATIO 2.1 09/09/2020    TSHULTRASEN 2.570 09/09/2020    CPKTOTAL 455 (H) 09/17/2020       Other: NA    Recent Imaging Studies:    None since last visit    ASSESSMENT AND PLAN  Patient Type, check all that apply:   Secondary Prevention  Established Atherosclerotic Cardiovascular Disease (ASCVD)  Yes, CAD with stent placement 2012  Other Established (non-atherosclerotic) Vascular Disease, if Present:  Hypertension, type 2 diabetes  Evidence of Heterozygous Familial Hypercholesterolemia (FH):   Unknown   ACC/AHA Indication for Statin Therapy, crystal all that apply:  Established ASCVD: Indication for High intensity statin   Calculated Risk for ASCVD, if applicable    40.3 %  Other Significant Risk Markers, if any, crystal all that apply   None  Goal LDL-C and nonHDL-C based on Clinic Protocol  LDL-C:   <70 mg/dL  Lifestyle Recommendations From Today’s Visit:    None  Statin Therapy Recommendations from Today’s Visit:   None  Non-Statin Medications Recommendations from Today’s Visit:   Initiate Praluent 75 mg/mL SQ injection every 14 days   Indication for PCSK9 Inhibitor, if applicable:  ASCVD with suboptimal control of LDL-C despite maximally tolerated statin  Supplements Recommended at this visit: None   Other Issues: none      Spoke with Mr. Steele in clinic today to discuss treatment of his hyperlipidemia. Patient was referred by cardiology as he has had an elevated CPK x 5 years of unknown etiology with muscle aches and pains on three previous  statin therapy trials. Where patient meets criteria for secondary prevention and statin therapies have been unsuccessfully attempted in the past, will initiate Praluent 75 mg/mL sq injection every 14 days to be followed up with in 4 weeks.      Studies Ordered at Todays Visit: None  Blood Work Ordered At Today’s visit: None   Follow-Up: 4 weeks    Jimmie Leija, PoojaD    CC:  ROXIE Nichols Christopher R, Jason Dent, PharmD  Michael Bloch, MD

## 2020-10-28 ENCOUNTER — TELEPHONE (OUTPATIENT)
Dept: VASCULAR LAB | Facility: MEDICAL CENTER | Age: 75
End: 2020-10-28

## 2020-10-28 ENCOUNTER — PATIENT MESSAGE (OUTPATIENT)
Dept: INTERNAL MEDICINE | Facility: IMAGING CENTER | Age: 75
End: 2020-10-28

## 2020-10-28 RX ORDER — METHOCARBAMOL 750 MG/1
TABLET, FILM COATED ORAL
Qty: 90 TAB | Refills: 1 | Status: SHIPPED | OUTPATIENT
Start: 2020-10-28 | End: 2021-10-28

## 2020-10-28 NOTE — TELEPHONE ENCOUNTER
From: Myke Steele  To: Shelly George M.D.  Sent: 10/28/2020 2:45 PM PDT  Subject: Non-Urgent Medical Question    Hi Dr George    Call you send in a prescription for Methocarbamol for me and    Rich Stringer

## 2020-11-23 ENCOUNTER — NON-PROVIDER VISIT (OUTPATIENT)
Dept: VASCULAR LAB | Facility: MEDICAL CENTER | Age: 75
End: 2020-11-23
Attending: INTERNAL MEDICINE
Payer: MEDICARE

## 2020-11-23 DIAGNOSIS — E78.5 DYSLIPIDEMIA: ICD-10-CM

## 2020-11-23 PROCEDURE — 99212 OFFICE O/P EST SF 10 MIN: CPT

## 2020-11-23 NOTE — PROGRESS NOTES
"[facility] - FollowUp Visit  Date of Service: 11/23/20  Visit start time: 9:45 AM  Visit end time: 10:15 AM    Myke Steele is here for follow up of dyslipidemia.    HPI  Pertinent Interval History since last visit:   Patient was here 4 weeks ago for an initial pharmacotherapy visit for dyslipidemia and was prescribed Praluent 75mg SC q 2 weeks. The patient received the medication from his mail order pharmacy and started injections: 1st injection on 11/4, 2nd injection on 11/18. Overall, patient says that he is doing well with the injections. He only complains of mild pain at the injection site which lasts ~5 days after injection as well as some symptoms of nasopharyngitis. However, he says that he is not too bothered by them and plans to continue his Praluent with full adherence.   Current Prescription Lipid Lowering Medications - including dose:   Statin: None  Non-Statin: Praluent 75mg SC q 2 weeks  Current Lipid Lowering and Related Supplements:   None  Any Current Side Effects Potentially Related to Lipid Lowering therapy?   Yes, Details: Injection site reactions (pain and redness at injection site), nasopharyngitis (dripping from nose, started only after shots)  Current Adherence to Lipid Lowering Therapies:  Complete  Any Previous History of Statin Intolerance?   Yes, Details: Patient trialed 3 different statins previously and had associated muscle pain and weakness with each one. Patient has increased CPK levels at baseline due to an unknown cause.  Baseline Lipids Prior to Treatment:  Shown here:  LDL-C: 122  nonHDL-C: 175  HDL-C: 47  Trigs: 266 Date: 9/9/2020    SOCIAL HISTORY  Social History     Tobacco Use   Smoking Status Never Smoker   Smokeless Tobacco Never Used      Change in weight: Stable  Exercise habits: Patient goes to physical therapy once weekly. Patients states that he rides his bike often, works out with weights, and \"chair yoga\".    Diet: Patient says he uses salt " sparingly and avoids fried foods. He eats vegetables every day and tries to eat more chicken and fish, though he occassionally has red meat. He admits that he eats larger portions.     ROS  Physical Exam    DATA REVIEW  Most Recent Lipid Panel:   Lab Results   Component Value Date    CHOLSTRLTOT 222 (H) 09/09/2020    TRIGLYCERIDE 266 (H) 09/09/2020    HDL 47 09/09/2020     (H) 09/09/2020       Other Pertinent Blood Work:   Lab Results   Component Value Date    SODIUM 138 09/09/2020    POTASSIUM 4.3 09/09/2020    CHLORIDE 100 09/09/2020    CO2 24 09/09/2020    ANION 14.0 09/09/2020    GLUCOSE 134 (H) 09/09/2020    BUN 22 09/09/2020    CREATININE 1.10 09/09/2020    CALCIUM 10.4 09/09/2020    ASTSGOT 31 09/09/2020    ALTSGPT 46 09/09/2020    ALKPHOSPHAT 81 09/09/2020    TBILIRUBIN 0.6 09/09/2020    ALBUMIN 4.6 09/09/2020    AGRATIO 2.1 09/09/2020    TSHULTRASEN 2.570 09/09/2020    CPKTOTAL 455 (H) 09/17/2020       Other:  NA    Recent Imaging Studies:    None since last visit    ASSESSMENT AND PLAN  Patient Type, check all that apply:   Secondary Prevention  Established Atherosclerotic Cardiovascular Disease (ASCVD)  Yes, Details: CAD s/p stent in 2012  Other Established (non-atherosclerotic) Vascular Disease, if Present:    Hypertension, Type 2 Diabetes  Evidence of Heterozygous Familial Hypercholesterolemia (FH): Unknown (If yes, how was diagnosis made)  ACC/AHA Indication for Statin Therapy, crystal all that apply:   Established ASCVD: Indication for High intensity statin    Calculated Risk for ASCVD, if applicable:  N/A  Other Significant Risk Markers, if any, crystal all that apply:  None  National Lipid Association (NLA) Goal (if applicable):  LDL-C:   <70 mg/dL  Lifestyle Recommendations From Today’s Visit:   Eating Plan: Concentrate on  DASH/Med Style diet. Patient will focus on trying to eat smaller portions to help reduce overall calorie intake.  Exercise: Continue with physical therapy, bike riding, and  weight exercises. Patient plans to keep up his active lifestyle.  Statin Recommendations from Today's Visit  None - patient cannot tolerate statin therapy.  Non-Statin Medications Recommendations from Today’s Visit:   Continue Praluent 75mg SC q 2 weeks  Indication for PCSK9 Inhibitor, if applicable:  ASCVD with suboptimal control of LDL-C despite maximally tolerated statin  Supplements Recommended at this visit:  None  Recommendations for Other Cardiovascular Risk Factors, crystal all that apply:   Hypertension- Continue reduced salt diet and with antihypertensive pharmacotherapy. Encouraged weight loss by eating smaller portions.   Diabetes - Continue with active lifestyle and increasing vegetable and decreasing carb intake. Continue with antidiabetic pharmacotherapy.  Other Issues:  N/A    Studies Ordered at Todays Visit:  None  Blood Work Ordered At Today’s visit:   Lipid panel to be completed in the next 2 weeks  Follow-Up:   4 weeks    Billie Gomez, PoojaD    CC:  Pooja LoweD

## 2020-11-25 NOTE — ASSESSMENT & PLAN NOTE
Chronic follows with neuro  
Cont home Bp meds  
Cont levo  
Cont proscar  
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  
Needs acs r/o multiple risk factors previous hx of 2 stents  Trend trops  EKG prn for chest pain   Stress test in am  Trial GI cocktail   tele  
On asa, on coreg. . 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  
Abdominal Pain, N/V/D

## 2020-12-10 ENCOUNTER — PATIENT MESSAGE (OUTPATIENT)
Dept: INTERNAL MEDICINE | Facility: IMAGING CENTER | Age: 75
End: 2020-12-10

## 2020-12-10 DIAGNOSIS — N40.1 BENIGN PROSTATIC HYPERPLASIA WITH URINARY OBSTRUCTION: Chronic | ICD-10-CM

## 2020-12-10 DIAGNOSIS — N13.8 BENIGN PROSTATIC HYPERPLASIA WITH URINARY OBSTRUCTION: Chronic | ICD-10-CM

## 2020-12-10 RX ORDER — TADALAFIL 5 MG/1
5 TABLET ORAL DAILY
Qty: 90 TAB | Refills: 1 | Status: SHIPPED | OUTPATIENT
Start: 2020-12-10 | End: 2021-07-19

## 2020-12-11 NOTE — TELEPHONE ENCOUNTER
From: Myke Steele  To: Shelly George M.D.  Sent: 12/10/2020 2:47 PM PST  Subject: Non-Urgent Medical Question    Hi Dr George  Can you refill this please    tadalafil 5 MG tablet  Commonly known as: CIALIS  Learn more  Take one tablet by mouth one time daily  [facility] [place] - [id number] isn't available for refills through [facility]. Contact the pharmacy to refill this prescription.    Thanks  Myke

## 2020-12-15 ENCOUNTER — HOSPITAL ENCOUNTER (OUTPATIENT)
Facility: MEDICAL CENTER | Age: 75
End: 2020-12-15
Attending: PSYCHIATRY & NEUROLOGY
Payer: MEDICARE

## 2020-12-15 ENCOUNTER — NON-PROVIDER VISIT (OUTPATIENT)
Dept: INTERNAL MEDICINE | Facility: IMAGING CENTER | Age: 75
End: 2020-12-15
Payer: MEDICARE

## 2020-12-15 LAB
CHOLEST SERPL-MCNC: 130 MG/DL (ref 100–199)
HDLC SERPL-MCNC: 46 MG/DL
LDLC SERPL CALC-MCNC: 52 MG/DL
TRIGL SERPL-MCNC: 159 MG/DL (ref 0–149)

## 2020-12-15 PROCEDURE — 80061 LIPID PANEL: CPT

## 2020-12-17 ENCOUNTER — PATIENT MESSAGE (OUTPATIENT)
Dept: CARDIOLOGY | Facility: MEDICAL CENTER | Age: 75
End: 2020-12-17

## 2020-12-21 ENCOUNTER — APPOINTMENT (OUTPATIENT)
Dept: VASCULAR LAB | Facility: MEDICAL CENTER | Age: 75
End: 2020-12-21
Attending: INTERNAL MEDICINE
Payer: MEDICARE

## 2021-01-15 DIAGNOSIS — Z23 NEED FOR VACCINATION: ICD-10-CM

## 2021-01-27 ENCOUNTER — IMMUNIZATION (OUTPATIENT)
Dept: FAMILY PLANNING/WOMEN'S HEALTH CLINIC | Facility: IMMUNIZATION CENTER | Age: 76
End: 2021-01-27
Attending: INTERNAL MEDICINE
Payer: MEDICARE

## 2021-01-27 DIAGNOSIS — Z23 ENCOUNTER FOR VACCINATION: Primary | ICD-10-CM

## 2021-01-27 DIAGNOSIS — Z23 NEED FOR VACCINATION: ICD-10-CM

## 2021-01-27 PROCEDURE — 91300 PFIZER SARS-COV-2 VACCINE: CPT

## 2021-01-27 PROCEDURE — 0001A PFIZER SARS-COV-2 VACCINE: CPT

## 2021-02-16 RX ORDER — MELOXICAM 7.5 MG/1
7.5 TABLET ORAL 2 TIMES DAILY PRN
Qty: 180 TABLET | Refills: 1 | Status: SHIPPED | OUTPATIENT
Start: 2021-02-16 | End: 2021-05-17

## 2021-02-19 ENCOUNTER — IMMUNIZATION (OUTPATIENT)
Dept: FAMILY PLANNING/WOMEN'S HEALTH CLINIC | Facility: IMMUNIZATION CENTER | Age: 76
End: 2021-02-19
Attending: INTERNAL MEDICINE
Payer: MEDICARE

## 2021-02-19 DIAGNOSIS — Z23 ENCOUNTER FOR VACCINATION: Primary | ICD-10-CM

## 2021-02-19 PROCEDURE — 0002A PFIZER SARS-COV-2 VACCINE: CPT

## 2021-02-19 PROCEDURE — 91300 PFIZER SARS-COV-2 VACCINE: CPT

## 2021-03-04 DIAGNOSIS — I10 ESSENTIAL HYPERTENSION: ICD-10-CM

## 2021-03-04 RX ORDER — AMLODIPINE BESYLATE 5 MG/1
5 TABLET ORAL DAILY
Qty: 90 TABLET | Refills: 3 | Status: SHIPPED | OUTPATIENT
Start: 2021-03-04 | End: 2022-02-23

## 2021-03-05 ENCOUNTER — HOSPITAL ENCOUNTER (OUTPATIENT)
Facility: MEDICAL CENTER | Age: 76
End: 2021-03-05
Attending: PHYSICIAN ASSISTANT
Payer: MEDICARE

## 2021-03-05 PROCEDURE — 84153 ASSAY OF PSA TOTAL: CPT

## 2021-03-06 LAB — PSA SERPL-MCNC: 7.34 NG/ML (ref 0–4)

## 2021-04-16 DIAGNOSIS — E78.5 DYSLIPIDEMIA: ICD-10-CM

## 2021-04-16 RX ORDER — ALIROCUMAB 75 MG/ML
0.75 INJECTION, SOLUTION SUBCUTANEOUS
Qty: 6 ML | Refills: 1 | Status: SHIPPED | OUTPATIENT
Start: 2021-04-16 | End: 2021-07-30

## 2021-04-19 DIAGNOSIS — I10 ESSENTIAL HYPERTENSION: ICD-10-CM

## 2021-04-19 RX ORDER — LISINOPRIL 40 MG/1
40 TABLET ORAL DAILY
Qty: 90 TABLET | Refills: 3 | Status: SHIPPED | OUTPATIENT
Start: 2021-04-19 | End: 2022-03-28

## 2021-04-27 ENCOUNTER — APPOINTMENT (RX ONLY)
Dept: URBAN - METROPOLITAN AREA CLINIC 4 | Facility: CLINIC | Age: 76
Setting detail: DERMATOLOGY
End: 2021-04-27

## 2021-04-27 DIAGNOSIS — Z85.828 PERSONAL HISTORY OF OTHER MALIGNANT NEOPLASM OF SKIN: ICD-10-CM

## 2021-04-27 DIAGNOSIS — D18.0 HEMANGIOMA: ICD-10-CM

## 2021-04-27 DIAGNOSIS — L82.0 INFLAMED SEBORRHEIC KERATOSIS: ICD-10-CM

## 2021-04-27 DIAGNOSIS — L82.1 OTHER SEBORRHEIC KERATOSIS: ICD-10-CM

## 2021-04-27 DIAGNOSIS — Z71.89 OTHER SPECIFIED COUNSELING: ICD-10-CM

## 2021-04-27 DIAGNOSIS — L81.4 OTHER MELANIN HYPERPIGMENTATION: ICD-10-CM

## 2021-04-27 DIAGNOSIS — D22 MELANOCYTIC NEVI: ICD-10-CM

## 2021-04-27 DIAGNOSIS — L57.0 ACTINIC KERATOSIS: ICD-10-CM

## 2021-04-27 DIAGNOSIS — D69.2 OTHER NONTHROMBOCYTOPENIC PURPURA: ICD-10-CM

## 2021-04-27 PROBLEM — D22.5 MELANOCYTIC NEVI OF TRUNK: Status: ACTIVE | Noted: 2021-04-27

## 2021-04-27 PROBLEM — D18.01 HEMANGIOMA OF SKIN AND SUBCUTANEOUS TISSUE: Status: ACTIVE | Noted: 2021-04-27

## 2021-04-27 PROBLEM — D22.62 MELANOCYTIC NEVI OF LEFT UPPER LIMB, INCLUDING SHOULDER: Status: ACTIVE | Noted: 2021-04-27

## 2021-04-27 PROCEDURE — ? SUNSCREEN TREATMENT REGIMEN

## 2021-04-27 PROCEDURE — 17003 DESTRUCT PREMALG LES 2-14: CPT | Mod: 59

## 2021-04-27 PROCEDURE — 17000 DESTRUCT PREMALG LESION: CPT | Mod: 59

## 2021-04-27 PROCEDURE — ? COUNSELING

## 2021-04-27 PROCEDURE — ? LIQUID NITROGEN

## 2021-04-27 PROCEDURE — 17110 DESTRUCTION B9 LES UP TO 14: CPT

## 2021-04-27 PROCEDURE — 99213 OFFICE O/P EST LOW 20 MIN: CPT | Mod: 25

## 2021-04-27 PROCEDURE — ? SUNSCREEN RECOMMENDATIONS

## 2021-04-27 PROCEDURE — ? OBSERVATION AND MEASURE

## 2021-04-27 ASSESSMENT — LOCATION ZONE DERM
LOCATION ZONE: SCALP
LOCATION ZONE: TRUNK
LOCATION ZONE: ARM
LOCATION ZONE: FACE
LOCATION ZONE: CORNEA

## 2021-04-27 ASSESSMENT — LOCATION SIMPLE DESCRIPTION DERM
LOCATION SIMPLE: SCALP
LOCATION SIMPLE: LEFT UPPER ARM
LOCATION SIMPLE: LEFT FOREARM
LOCATION SIMPLE: ABDOMEN
LOCATION SIMPLE: CHEST
LOCATION SIMPLE: RIGHT FOREHEAD
LOCATION SIMPLE: LEFT ZYGOMA
LOCATION SIMPLE: LEFT EYE
LOCATION SIMPLE: RIGHT CHEEK

## 2021-04-27 ASSESSMENT — LOCATION DETAILED DESCRIPTION DERM
LOCATION DETAILED: LEFT MEDIAL INFERIOR CHEST
LOCATION DETAILED: LEFT PROXIMAL RADIAL DORSAL FOREARM
LOCATION DETAILED: LEFT IRIS
LOCATION DETAILED: LEFT DISTAL POSTERIOR UPPER ARM
LOCATION DETAILED: LEFT PROXIMAL DORSAL FOREARM
LOCATION DETAILED: RIGHT SUPERIOR MEDIAL MALAR CHEEK
LOCATION DETAILED: LEFT MEDIAL ZYGOMA
LOCATION DETAILED: LEFT LATERAL PROXIMAL UPPER ARM
LOCATION DETAILED: RIGHT FOREHEAD
LOCATION DETAILED: EPIGASTRIC SKIN
LOCATION DETAILED: PERIUMBILICAL SKIN
LOCATION DETAILED: RIGHT SUPERIOR FRONTAL SCALP

## 2021-04-27 NOTE — PROCEDURE: LIQUID NITROGEN
Duration Of Freeze Thaw-Cycle (Seconds): 5
Detail Level: Detailed
Number Of Freeze-Thaw Cycles: 2 freeze-thaw cycles
Render Note In Bullet Format When Appropriate: No
Post-Care Instructions: I reviewed with the patient in detail post-care instructions. Patient is to wear sunprotection, and avoid picking at any of the treated lesions. Pt may apply Vaseline to crusted or scabbing areas.
Consent: The patient's consent was obtained including but not limited to risks of crusting, scabbing, blistering, scarring, darker or lighter pigmentary change, recurrence, incomplete removal and infection.
Number Of Freeze-Thaw Cycles: 3 freeze-thaw cycles
Duration Of Freeze Thaw-Cycle (Seconds): 10-15
Medical Necessity Clause: This procedure was medically necessary because the lesions that were treated were:
Pared With?: Dermablade
Medical Necessity Information: It is in your best interest to select a reason for this procedure from the list below. All of these items fulfill various CMS LCD requirements except the new and changing color options.

## 2021-04-30 ENCOUNTER — HOSPITAL ENCOUNTER (OUTPATIENT)
Dept: RADIOLOGY | Facility: MEDICAL CENTER | Age: 76
End: 2021-04-30
Attending: PHYSICIAN ASSISTANT
Payer: MEDICARE

## 2021-04-30 DIAGNOSIS — R97.20 RAISED PROSTATE SPECIFIC ANTIGEN: ICD-10-CM

## 2021-04-30 PROCEDURE — 72197 MRI PELVIS W/O & W/DYE: CPT | Mod: MH

## 2021-04-30 PROCEDURE — 700111 HCHG RX REV CODE 636 W/ 250 OVERRIDE (IP): Mod: JG | Performed by: RADIOLOGY

## 2021-04-30 PROCEDURE — 700117 HCHG RX CONTRAST REV CODE 255: Performed by: PHYSICIAN ASSISTANT

## 2021-04-30 PROCEDURE — A9576 INJ PROHANCE MULTIPACK: HCPCS | Performed by: PHYSICIAN ASSISTANT

## 2021-04-30 RX ADMIN — GLUCAGON 1 MG: 1 INJECTION, POWDER, LYOPHILIZED, FOR SOLUTION INTRAMUSCULAR; INTRAVENOUS at 15:20

## 2021-04-30 RX ADMIN — GADOTERIDOL 20 ML: 279.3 INJECTION, SOLUTION INTRAVENOUS at 16:15

## 2021-05-25 RX ORDER — OXYBUTYNIN CHLORIDE 10 MG/1
10 TABLET, EXTENDED RELEASE ORAL
Qty: 90 TABLET | Refills: 3 | Status: SHIPPED | OUTPATIENT
Start: 2021-05-25 | End: 2022-10-20

## 2021-05-26 ENCOUNTER — TELEPHONE (OUTPATIENT)
Dept: VASCULAR LAB | Facility: MEDICAL CENTER | Age: 76
End: 2021-05-26

## 2021-05-26 DIAGNOSIS — E78.5 DYSLIPIDEMIA: Primary | ICD-10-CM

## 2021-05-26 NOTE — TELEPHONE ENCOUNTER
[facility]    Pt called regarding upcoming appointment 6/9/21.     Pt states he has continued Praluent since last pharmacotherapy visit (11/23/2020) without any issues.    Ordered new lipid panel to be drawn prior to appointment provided last lipid panel 12/2020.    Michael Goldberg, PoojaD, MSPH

## 2021-05-27 ENCOUNTER — HOSPITAL ENCOUNTER (OUTPATIENT)
Facility: MEDICAL CENTER | Age: 76
End: 2021-05-27
Attending: INTERNAL MEDICINE
Payer: MEDICARE

## 2021-05-27 ENCOUNTER — NON-PROVIDER VISIT (OUTPATIENT)
Dept: INTERNAL MEDICINE | Facility: IMAGING CENTER | Age: 76
End: 2021-05-27
Payer: MEDICARE

## 2021-05-27 DIAGNOSIS — E78.5 DYSLIPIDEMIA: ICD-10-CM

## 2021-05-27 LAB
CHOLEST SERPL-MCNC: 164 MG/DL (ref 100–199)
HDLC SERPL-MCNC: 41 MG/DL
LDLC SERPL CALC-MCNC: 91 MG/DL
TRIGL SERPL-MCNC: 158 MG/DL (ref 0–149)

## 2021-05-27 PROCEDURE — 80061 LIPID PANEL: CPT

## 2021-06-09 ENCOUNTER — NON-PROVIDER VISIT (OUTPATIENT)
Dept: VASCULAR LAB | Facility: MEDICAL CENTER | Age: 76
End: 2021-06-09
Attending: INTERNAL MEDICINE
Payer: MEDICARE

## 2021-06-09 DIAGNOSIS — E78.5 DYSLIPIDEMIA: Primary | ICD-10-CM

## 2021-06-09 PROCEDURE — 99212 OFFICE O/P EST SF 10 MIN: CPT

## 2021-06-09 RX ORDER — ALPHA LIPOIC ACID 600 MG
600 TABLET ORAL 2 TIMES DAILY
COMMUNITY

## 2021-06-09 NOTE — NON-PROVIDER
[facility] - FollowUp Visit  Date of Service: 06/09/21    Myke Steele is here for follow up of dyslipidemia.    HPI  Pertinent Interval History since last visit:   Pt has been tolerating current regimen.  His nasopharyngitis resolved with the use of antihistamines.  Pt did restart alpha lipoic acid supplement for his nerves ~5-6 weeks ago    Current Prescription Lipid Lowering Medications - including dose:   Statin: None  Non-Statin: Praluent 75 mg q 14 days  Current Lipid Lowering and Related Supplements:   None  Any Current Side Effects Potentially Related to Lipid Lowering therapy?   No  Current Adherence to Lipid Lowering Therapies:  Complete  Any Previous History of Statin Intolerance?   Yes, Details: Patient trialed 3 different statins previously and had associated muscle pain and weakness with each one. Patient has increased CPK levels at baseline due to an unknown cause.  Baseline Lipids Prior to Treatment:  Shown here:  LDL-C: 122       nonHDL-C: 175           HDL-C: 47       Trigs: 266        Date: 9/9/2020    SOCIAL HISTORY  Social History     Tobacco Use   Smoking Status Never Smoker   Smokeless Tobacco Never Used        DATA REVIEW  Most Recent Lipid Panel:   Lab Results   Component Value Date    CHOLSTRLTOT 164 05/27/2021    TRIGLYCERIDE 158 (H) 05/27/2021    HDL 41 05/27/2021    LDL 91 05/27/2021       Other Pertinent Blood Work:   Lab Results   Component Value Date    SODIUM 138 09/09/2020    POTASSIUM 4.3 09/09/2020    CHLORIDE 100 09/09/2020    CO2 24 09/09/2020    ANION 14.0 09/09/2020    GLUCOSE 134 (H) 09/09/2020    BUN 22 09/09/2020    CREATININE 1.10 09/09/2020    CALCIUM 10.4 09/09/2020    ASTSGOT 31 09/09/2020    ALTSGPT 46 09/09/2020    ALKPHOSPHAT 81 09/09/2020    TBILIRUBIN 0.6 09/09/2020    ALBUMIN 4.6 09/09/2020    AGRATIO 2.1 09/09/2020    TSHULTRASEN 2.570 09/09/2020    CPKTOTAL 455 (H) 09/17/2020       ASSESSMENT AND PLAN  Patient Type, check all that apply:    Secondary Prevention  Established Atherosclerotic Cardiovascular Disease (ASCVD)  Yes, Details: CAD s/p stent in 2012  Other Established (non-atherosclerotic) Vascular Disease, if Present:    Hypertension, Type 2 Diabetes  Evidence of Heterozygous Familial Hypercholesterolemia (FH): Unknown (If yes, how was diagnosis made)  ACC/AHA Indication for Statin Therapy, crystal all that apply:   Established ASCVD: Indication for High intensity statin    Calculated Risk for ASCVD, if applicable:  N/A  Other Significant Risk Markers, if any, crystal all that apply:  None  National Lipid Association (NLA) Goal (if applicable):  LDL-C:   <70 mg/dL  n-HDL < 100 mg/dL  Lifestyle Recommendations From Today’s Visit:   Eating Plan: Concentrate on  DASH/Med Style diet. Patient will focus on trying to eat smaller portions to help reduce overall calorie intake.  Exercise: Continue with physical therapy, bike riding, and weight exercises. Patient plans to keep up his active lifestyle.  Statin Recommendations from Today's Visit  None - patient cannot tolerate statin therapy.  Non-Statin Medications Recommendations from Today’s Visit:   Continue Praluent 75mg SC q 2 weeks  Indication for PCSK9 Inhibitor, if applicable:  ASCVD with suboptimal control of LDL-C despite maximally tolerated statin  Supplements Recommended at this visit:  None  Recommendations for Other Cardiovascular Risk Factors, crystal all that apply:   Hypertension- Continue reduced salt diet and with antihypertensive pharmacotherapy. Encouraged weight loss by eating smaller portions.   Diabetes - Continue with active lifestyle and increasing vegetable and decreasing carb intake. Continue with antidiabetic pharmacotherapy.  Other Issues:  Pt's LDL and n--HDL is above target and increased since December 2020. The only change was that he started alpha lipoic acid supplement. He denies lapses in refills for Praluent and denies missing any doses. Discussed increasing Praluent to 150  mg q 14 days, however after shared decision making, pt will try decreasing ALA supplement dose to 600 mg daily and check lipid panel in 6 weeks.     Studies Ordered at Todays Visit:  None  Blood Work Ordered At Today’s visit:   Lipid panel   Follow-Up:   6 weeks    Ratna Landrum, PharmD    CC:  Keira Slade M.D.  Wade, Raj SMITH, *

## 2021-06-15 ENCOUNTER — OFFICE VISIT (OUTPATIENT)
Dept: INTERNAL MEDICINE | Facility: IMAGING CENTER | Age: 76
End: 2021-06-15
Payer: MEDICARE

## 2021-06-15 VITALS
BODY MASS INDEX: 38.78 KG/M2 | HEART RATE: 73 BPM | WEIGHT: 277 LBS | SYSTOLIC BLOOD PRESSURE: 142 MMHG | HEIGHT: 71 IN | TEMPERATURE: 96.9 F | RESPIRATION RATE: 14 BRPM | DIASTOLIC BLOOD PRESSURE: 78 MMHG | OXYGEN SATURATION: 97 %

## 2021-06-15 DIAGNOSIS — M25.561 CHRONIC PAIN OF RIGHT KNEE: ICD-10-CM

## 2021-06-15 DIAGNOSIS — I73.9 VASCULAR CLAUDICATION (HCC): ICD-10-CM

## 2021-06-15 DIAGNOSIS — E11.9 TYPE 2 DIABETES MELLITUS WITHOUT COMPLICATION, WITHOUT LONG-TERM CURRENT USE OF INSULIN (HCC): ICD-10-CM

## 2021-06-15 DIAGNOSIS — M54.50 CHRONIC BILATERAL LOW BACK PAIN, UNSPECIFIED WHETHER SCIATICA PRESENT: ICD-10-CM

## 2021-06-15 DIAGNOSIS — Z95.5 STENTED CORONARY ARTERY: ICD-10-CM

## 2021-06-15 DIAGNOSIS — I25.10 CORONARY ATHEROSCLEROSIS DUE TO LIPID RICH PLAQUE: Chronic | ICD-10-CM

## 2021-06-15 DIAGNOSIS — D72.829 LEUKOCYTOSIS, UNSPECIFIED TYPE: Chronic | ICD-10-CM

## 2021-06-15 DIAGNOSIS — E03.9 HYPOTHYROIDISM, UNSPECIFIED TYPE: ICD-10-CM

## 2021-06-15 DIAGNOSIS — N13.8 BENIGN PROSTATIC HYPERPLASIA WITH URINARY OBSTRUCTION: Chronic | ICD-10-CM

## 2021-06-15 DIAGNOSIS — G89.29 CHRONIC BILATERAL LOW BACK PAIN, UNSPECIFIED WHETHER SCIATICA PRESENT: ICD-10-CM

## 2021-06-15 DIAGNOSIS — E78.1 HYPERTRIGLYCERIDEMIA: Chronic | ICD-10-CM

## 2021-06-15 DIAGNOSIS — N40.1 BENIGN PROSTATIC HYPERPLASIA WITH URINARY OBSTRUCTION: Chronic | ICD-10-CM

## 2021-06-15 DIAGNOSIS — R74.8 ELEVATED CPK: Chronic | ICD-10-CM

## 2021-06-15 DIAGNOSIS — Z76.89 ESTABLISHING CARE WITH NEW DOCTOR, ENCOUNTER FOR: ICD-10-CM

## 2021-06-15 DIAGNOSIS — I25.83 CORONARY ATHEROSCLEROSIS DUE TO LIPID RICH PLAQUE: Chronic | ICD-10-CM

## 2021-06-15 DIAGNOSIS — E78.5 DYSLIPIDEMIA: ICD-10-CM

## 2021-06-15 DIAGNOSIS — E66.01 CLASS 2 SEVERE OBESITY DUE TO EXCESS CALORIES WITH SERIOUS COMORBIDITY AND BODY MASS INDEX (BMI) OF 38.0 TO 38.9 IN ADULT (HCC): ICD-10-CM

## 2021-06-15 DIAGNOSIS — G89.29 CHRONIC PAIN OF RIGHT KNEE: ICD-10-CM

## 2021-06-15 DIAGNOSIS — G62.9 NEUROPATHY: ICD-10-CM

## 2021-06-15 PROBLEM — E66.812 CLASS 2 SEVERE OBESITY WITH SERIOUS COMORBIDITY AND BODY MASS INDEX (BMI) OF 38.0 TO 38.9 IN ADULT (HCC): Status: ACTIVE | Noted: 2018-08-31

## 2021-06-15 LAB
HBA1C MFR BLD: 6.4 % (ref 0–5.6)
INT CON NEG: ABNORMAL
INT CON POS: ABNORMAL

## 2021-06-15 PROCEDURE — 99215 OFFICE O/P EST HI 40 MIN: CPT | Performed by: FAMILY MEDICINE

## 2021-06-15 PROCEDURE — 83036 HEMOGLOBIN GLYCOSYLATED A1C: CPT | Performed by: FAMILY MEDICINE

## 2021-06-15 RX ORDER — MELOXICAM 7.5 MG/1
TABLET ORAL
COMMUNITY
Start: 2021-05-17 | End: 2021-08-16

## 2021-06-15 ASSESSMENT — FIBROSIS 4 INDEX: FIB4 SCORE: 2.17

## 2021-06-15 NOTE — ASSESSMENT & PLAN NOTE
Patient's body mass index is 38.65 kg/m². Exercise and nutrition counseling were performed at this visit.

## 2021-06-16 NOTE — PROGRESS NOTES
"Chief Complaint   Patient presents with   • Establish Care       Subjective:     HPI:   Myke Steele is a 75 y.o. male here to establish care and to discuss the evaluation and management of:       Problem   Hypertriglyceridemia   Elevated Cpk    Patient reports extensive work-up without results for etiology     Class 2 Severe Obesity With Serious Comorbidity and Body Mass Index (Bmi) of 38.0 to 38.9 in Adult ([facility])   Type 2 Diabetes Mellitus Without Complication, Without Long-Term Current Use of Insulin ([facility])    Controlled with Metformin   A1c today is 6.4  Eye exam will be  done in July       Hypothyroid   Vascular Claudication ([facility])   Neuropathy    Chronic left leg weakness and difficulty with balance     Leukocytosis    Patient reports long history of elevated white count  Per hematology work-up no cause found  Results normalized over time     Coronary arteriosclerosis due to lipid rich plaque    IMO load March 2020  Stents x 2 2003       Stented Coronary Artery    2007; 90% LAD and 90% CFX, both treated with 2.5mm Taxus stents     BPH (benign prostatic hypertrophy) with urinary obstruction    Long history of enlarged prostate, negative biopsies     Dyslipidemia    He has been off of statins for 4 years, he has history of elevated CPK of unknown cause  Currently on Praluent but his body seems to be less responsive to it than initially     Command         Objective:     /78   Pulse 73   Temp 36.1 °C (96.9 °F) (Temporal)   Resp 14   Ht 1.803 m (5' 10.98\")   Wt (!) 126 kg (277 lb)   SpO2 97%  Body mass index is 38.65 kg/m².    Physical Exam:  Physical Exam  Constitutional: Well-developed and well-nourished. Not diaphoretic. No distress.   Skin: Skin is warm and dry. No rash noted.  Head: Atraumatic without lesions.  Eyes: Conjunctivae and extraocular motions are normal.   Ears:  External ears unremarkable.          Neck: Supple, .  .  Extremities: No cyanosis, clubbing, erythema, nor edema. "   Neurological: Alert and oriented x 3.    Psychiatric:  Behavior, mood, and affect are appropriate     Assessment and Plan:     The following treatment plan was discussed/researched:  Coronary arteriosclerosis due to lipid rich plaque  Stable on current regimen  No longer tolerates statins-myalgias  Cont regular f/u with cardiology    Class 2 severe obesity with serious comorbidity and body mass index (BMI) of 38.0 to 38.9 in adult ([facility])  Patient's body mass index is 38.65 kg/m². Exercise and nutrition counseling were performed at this visit.      Dyslipidemia  Continue management as per cardiology    Type 2 diabetes mellitus without complication, without long-term current use of insulin ([facility])  Doing well, continue current regimen    Hypertriglyceridemia  Monitored by cardiology, currently on Praluent    Hypothyroid  Continue present medication    Vascular claudication ([facility]) ([facility])  Stable, managed by cardiologist    BPH (benign prostatic hypertrophy) with urinary obstruction  Stable, managed by urology    Neuropathy  Recommend follow-up with neurology    Stented coronary artery  Continue management as per cardiology    Elevated CPK  Continue to monitor    Leukocytosis  Continue with annual monitoring                                                                                                                                                                                 Any change or worsening of signs or symptoms, patient encouraged to follow-up or report to emergency room for further evaluation. Patient verbalizes understanding and agrees.    Follow-Up: No follow-ups on file.      PLEASE NOTE: This dictation was created using voice recognition software. I have made every reasonable attempt to correct obvious errors, but I expect that there are errors of grammar and possibly content that I did not discover before finalizing the note.      My total time spent caring for the patient on the day of the  encounter was  greater than 40 minutes.   This includes obtaining history, reviewing chart, physical exam, patient education, reviewing outside records, placing orders, interpreting tests and coordinating care.

## 2021-06-25 ENCOUNTER — OFFICE VISIT (OUTPATIENT)
Dept: INTERNAL MEDICINE | Facility: IMAGING CENTER | Age: 76
End: 2021-06-25
Payer: MEDICARE

## 2021-06-25 ENCOUNTER — HOSPITAL ENCOUNTER (OUTPATIENT)
Dept: RADIOLOGY | Facility: MEDICAL CENTER | Age: 76
End: 2021-06-25
Attending: INTERNAL MEDICINE
Payer: MEDICARE

## 2021-06-25 VITALS
SYSTOLIC BLOOD PRESSURE: 128 MMHG | OXYGEN SATURATION: 93 % | RESPIRATION RATE: 14 BRPM | TEMPERATURE: 99.1 F | HEART RATE: 85 BPM | DIASTOLIC BLOOD PRESSURE: 80 MMHG

## 2021-06-25 DIAGNOSIS — S80.211A ABRASION, KNEE, RIGHT, INITIAL ENCOUNTER: ICD-10-CM

## 2021-06-25 DIAGNOSIS — S46.011A ROTATOR CUFF STRAIN, RIGHT, INITIAL ENCOUNTER: ICD-10-CM

## 2021-06-25 DIAGNOSIS — M25.532 WRIST PAIN, ACUTE, LEFT: ICD-10-CM

## 2021-06-25 PROCEDURE — 99214 OFFICE O/P EST MOD 30 MIN: CPT | Performed by: INTERNAL MEDICINE

## 2021-06-25 PROCEDURE — 73110 X-RAY EXAM OF WRIST: CPT | Mod: LT

## 2021-06-25 NOTE — PROGRESS NOTES
Chief Complaint   Patient presents with   • Fall   • Shoulder Pain   • Wrist Pain       HISTORY OF THE PRESENT ILLNESS: Patient is a 75 y.o. male.     Patient comes in for evaluation of left hand/wrist, right shoulder and right knee injury.  Last night while walking down the steps he slipped.  One of the steps was wet.  He fell on his left hand/wrist and rotated to the right which caused an abrasion to his right knee and direct trauma to the right shoulder.  No loss of consciousness.  The pain in the right shoulder is similar to pain he had in the left shoulder when he had a complete rotator cuff tear requiring surgery.  His range of motion however is much better compared to when he having a tear    Today he found the shoulder pain persists with decreased range of motion.  He also has pain at the base of the left thumb with significant bruising in the area.  The abrasion on his right knee is closed, dry.  No knee pain    Allergies: Atorvastatin    Current Outpatient Medications Ordered in Epic   Medication Sig Dispense Refill   • meloxicam (MOBIC) 7.5 MG Tab TAKE 1 TABLET BY MOUTH 2 TIMES A DAY AS NEEDED FOR INFLAMMATION FOR UP TO 90 DAYS.     • Alpha-Lipoic Acid 600 MG Tab Take 600 mg by mouth 2 times a day.     • oxybutynin SR (DITROPAN-XL) 10 MG CR tablet Take 1 tablet by mouth every day. 90 tablet 3   • lisinopril (PRINIVIL) 40 MG tablet Take 1 tablet by mouth every day. 90 tablet 3   • Alirocumab (PRALUENT) 75 MG/ML Solution Auto-injector Inject 0.75 mg under the skin every 14 days. 6 mL 1   • amLODIPine (NORVASC) 5 MG Tab Take 1 tablet by mouth every day. 90 tablet 3   • tadalafil (CIALIS) 5 MG tablet Take 1 Tab by mouth every day. 90 Tab 1   • methocarbamol (ROBAXIN) 750 MG Tab TAKE 1 TAB BY MOUTH 2 TO 3 TIMES A DAY AS NEEDED FOR MUSCLE SPASM 90 Tab 1   • metFORMIN ER (GLUCOPHAGE XR) 500 MG TABLET SR 24 HR Take 2 Tabs by mouth every day. 180 Tab 3   • levothyroxine (SYNTHROID) 75 MCG Tab Take 1 Tab by mouth  every day. 90 Tab 3   • cyclobenzaprine (FLEXERIL) 10 MG Tab TAKE 1 TABLET BY MOUTH TWICE A DAY AS NEEDED 40 Tab 1   • carvedilol (COREG) 12.5 MG Tab Take 1 Tab by mouth 2 times a day, with meals. 180 Tab 3   • omeprazole (PRILOSEC) 20 MG delayed-release capsule Take 1 Cap by mouth every day. 90 Cap 3   • betamethasone dipropionate (DIPROLENE) 0.05 % Cream Apply 1 Application to affected area(s) 2 times a day. To area on feet as needed 60 g 2   • docusate sodium (COLACE) 100 MG Cap TAKE 1 CAP BY MOUTH 2 TIMES A DAY *PAYS CASH* 180 Cap 1   • triamcinolone acetonide (KENALOG) 0.1 % Cream Apply 1 Application to affected area(s) 2 times a day. 45 g 2   • FEXOFENADINE HCL PO Take  by mouth.     • Flaxseed, Linseed, (FLAX SEEDS PO) Take  by mouth.     • Coenzyme Q10 (CO Q-10) 100 MG Cap Take 200 mg by mouth every day.     • LevOCARNitine (CARNITINE, L,) Powder 3 g by Does not apply route every day.     • aspirin (ASA) 81 MG Chew Tab chewable tablet Take 162 mg by mouth every day. 100 Tab 11     No current [facility]-ordered facility-administered medications on file.       Past medical history, social history and family history were reviewed from chart today    Review of systems: Per HPI.    No syncope, dizziness, chest pain, dyspnea  All others negative.     Exam: /80   Pulse 85   Temp 37.3 °C (99.1 °F) (Temporal)   Resp 14   SpO2 93%   General: Well-appearing. Well-developed. No signs of distress.  HEENT: Grossly normal.   Neck: Supple   Pulmonary: Clear   Cardiovascular: Regular.  Radial pulses are intact bilaterally.  Abdomen: Protuberant but soft and nontender.  Neurologic: Cranial nerves II through XII are grossly normal, alert and oriented x3  Extremities: The base of the left thumb is tender to palpate.  Normal  strength.  Moderate bruising in the palm and wrist.  The lateral right knee has a superficial abrasion.  No sign of infection.  The right shoulder is mildly tender in acromioclavicular and  glenohumeral joints.  He is tender over the deltoid and upper scapula.  He is only able to raise his arm to approximately 70 degrees.  Forward abduction is to approximately 60 degrees.  He is unable to maintain abduction against resistance.    Diagnosis:  1. Rotator cuff strain, right, initial encounter     2. Wrist pain, acute, left  DX-WRIST-COMPLETE 3+ LEFT   3. Abrasion, knee, right, initial encounter         Suspect rotator cuff strain versus tear.  Recommended resting and icing.  He has meloxicam which she will take his anti-inflammatory and pain control.  He also has methocarbamol that he can take as needed.  Encouraged him to avoid additional ibuprofen or Aleve.    If the shoulder is feeling better he can start range of motion exercises on Sunday/Monday.  If symptoms persist or he is unable to perform the exercises then I recommend further work-up.    Tenderness at the base of the left thumb could be consistent with scaphoid fracture.  Recommend x-ray.  If negative consider MRI if pain persists.  Because of swelling I did not recommend immobilization at this time.    Wound care only for abrasion.  Tetanus updated in 2014

## 2021-07-17 DIAGNOSIS — N40.1 BENIGN PROSTATIC HYPERPLASIA WITH URINARY OBSTRUCTION: Chronic | ICD-10-CM

## 2021-07-17 DIAGNOSIS — N13.8 BENIGN PROSTATIC HYPERPLASIA WITH URINARY OBSTRUCTION: Chronic | ICD-10-CM

## 2021-07-19 DIAGNOSIS — I10 ESSENTIAL HYPERTENSION: ICD-10-CM

## 2021-07-19 RX ORDER — CARVEDILOL 12.5 MG/1
12.5 TABLET ORAL 2 TIMES DAILY WITH MEALS
Qty: 180 TABLET | Refills: 3 | Status: SHIPPED | OUTPATIENT
Start: 2021-07-19 | End: 2022-08-10

## 2021-07-19 RX ORDER — OMEPRAZOLE 20 MG/1
20 CAPSULE, DELAYED RELEASE ORAL DAILY
Qty: 90 CAPSULE | Refills: 3 | Status: SHIPPED | OUTPATIENT
Start: 2021-07-19 | End: 2022-08-10

## 2021-07-19 RX ORDER — TADALAFIL 5 MG/1
TABLET ORAL
Qty: 90 TABLET | Refills: 1 | Status: SHIPPED | OUTPATIENT
Start: 2021-07-19 | End: 2022-02-14 | Stop reason: SDUPTHER

## 2021-07-20 ENCOUNTER — HOSPITAL ENCOUNTER (OUTPATIENT)
Facility: MEDICAL CENTER | Age: 76
End: 2021-07-20
Attending: INTERNAL MEDICINE
Payer: MEDICARE

## 2021-07-20 ENCOUNTER — HOSPITAL ENCOUNTER (OUTPATIENT)
Facility: MEDICAL CENTER | Age: 76
End: 2021-07-20
Attending: FAMILY MEDICINE
Payer: MEDICARE

## 2021-07-20 ENCOUNTER — NON-PROVIDER VISIT (OUTPATIENT)
Dept: INTERNAL MEDICINE | Facility: IMAGING CENTER | Age: 76
End: 2021-07-20
Payer: MEDICARE

## 2021-07-20 DIAGNOSIS — E11.9 TYPE 2 DIABETES MELLITUS WITHOUT COMPLICATION, WITHOUT LONG-TERM CURRENT USE OF INSULIN (HCC): ICD-10-CM

## 2021-07-20 DIAGNOSIS — E03.9 HYPOTHYROIDISM, UNSPECIFIED TYPE: ICD-10-CM

## 2021-07-20 LAB
ALBUMIN SERPL BCP-MCNC: 4.3 G/DL (ref 3.2–4.9)
ALBUMIN/GLOB SERPL: 1.8 G/DL
ALP SERPL-CCNC: 67 U/L (ref 30–99)
ALT SERPL-CCNC: 38 U/L (ref 2–50)
ANION GAP SERPL CALC-SCNC: 13 MMOL/L (ref 7–16)
AST SERPL-CCNC: 25 U/L (ref 12–45)
BILIRUB SERPL-MCNC: 0.5 MG/DL (ref 0.1–1.5)
BUN SERPL-MCNC: 26 MG/DL (ref 8–22)
CALCIUM SERPL-MCNC: 10 MG/DL (ref 8.5–10.5)
CHLORIDE SERPL-SCNC: 106 MMOL/L (ref 96–112)
CO2 SERPL-SCNC: 23 MMOL/L (ref 20–33)
CREAT SERPL-MCNC: 1.02 MG/DL (ref 0.5–1.4)
CREAT UR-MCNC: 209.15 MG/DL
EST. AVERAGE GLUCOSE BLD GHB EST-MCNC: 131 MG/DL
GLOBULIN SER CALC-MCNC: 2.4 G/DL (ref 1.9–3.5)
GLUCOSE SERPL-MCNC: 142 MG/DL (ref 65–99)
HBA1C MFR BLD: 6.2 % (ref 4–5.6)
MICROALBUMIN UR-MCNC: 2 MG/DL
MICROALBUMIN/CREAT UR: 10 MG/G (ref 0–30)
POTASSIUM SERPL-SCNC: 4.4 MMOL/L (ref 3.6–5.5)
PROT SERPL-MCNC: 6.7 G/DL (ref 6–8.2)
SODIUM SERPL-SCNC: 142 MMOL/L (ref 135–145)
TSH SERPL DL<=0.005 MIU/L-ACNC: 1.65 UIU/ML (ref 0.38–5.33)

## 2021-07-20 PROCEDURE — 80053 COMPREHEN METABOLIC PANEL: CPT

## 2021-07-20 PROCEDURE — 82570 ASSAY OF URINE CREATININE: CPT

## 2021-07-20 PROCEDURE — 84443 ASSAY THYROID STIM HORMONE: CPT

## 2021-07-20 PROCEDURE — 83036 HEMOGLOBIN GLYCOSYLATED A1C: CPT

## 2021-07-20 PROCEDURE — 80061 LIPID PANEL: CPT

## 2021-07-20 PROCEDURE — 82043 UR ALBUMIN QUANTITATIVE: CPT

## 2021-07-22 DIAGNOSIS — E78.5 DYSLIPIDEMIA: ICD-10-CM

## 2021-07-22 LAB
CHOLEST SERPL-MCNC: 146 MG/DL (ref 100–199)
HDLC SERPL-MCNC: 43 MG/DL
LDLC SERPL CALC-MCNC: 57 MG/DL
TRIGL SERPL-MCNC: 228 MG/DL (ref 0–149)

## 2021-07-30 ENCOUNTER — NON-PROVIDER VISIT (OUTPATIENT)
Dept: VASCULAR LAB | Facility: MEDICAL CENTER | Age: 76
End: 2021-07-30
Attending: INTERNAL MEDICINE
Payer: MEDICARE

## 2021-07-30 VITALS — DIASTOLIC BLOOD PRESSURE: 80 MMHG | HEART RATE: 73 BPM | SYSTOLIC BLOOD PRESSURE: 137 MMHG

## 2021-07-30 DIAGNOSIS — E78.5 DYSLIPIDEMIA: ICD-10-CM

## 2021-07-30 PROCEDURE — 99212 OFFICE O/P EST SF 10 MIN: CPT | Performed by: STUDENT IN AN ORGANIZED HEALTH CARE EDUCATION/TRAINING PROGRAM

## 2021-07-30 NOTE — NON-PROVIDER
[facility] - FollowUp Visit  Date of Service: 07/30/21    Myke Steele is here for follow up of dyslipidemia.    HPI  Pertinent Interval History since last visit:   Pt decreased alpha lipoic acid from 1200mg daily to 600mg daily. Pt also states losing 12 pounds with portion control.  Current Prescription Lipid Lowering Medications - including dose:   Statin: None  Non-Statin: Praluent 75 mg q 14 days  Current Lipid Lowering and Related Supplements:   None  Any Current Side Effects Potentially Related to Lipid Lowering therapy?   No  Current Adherence to Lipid Lowering Therapies:  Complete  Any Previous History of Statin Intolerance?   Yes, Details: Patient trialed 3 different statins previously and had associated muscle pain and weakness with each one. Patient has increased CPK levels at baseline due to an unknown cause.  Baseline Lipids Prior to Treatment:  Shown here:  LDL-C: 122       nonHDL-C: 175           HDL-C: 47       Trigs: 266        Date: 9/9/2020    SOCIAL HISTORY  Social History     Tobacco Use   Smoking Status Never Smoker   Smokeless Tobacco Never Used      Change in weight: Pt notes a 12 pound loss  Exercise habits: moderate regular exercise program   Diet: diabetic    Vitals:    07/30/21 0846 07/30/21 0849   BP: 137/82 137/80   Pulse: 73 73      Physical Exam    DATA REVIEW  Most Recent Lipid Panel:   Lab Results   Component Value Date    CHOLSTRLTOT 146 07/20/2021    TRIGLYCERIDE 228 (H) 07/20/2021    HDL 43 07/20/2021    LDL 57 07/20/2021       Other Pertinent Blood Work:   Lab Results   Component Value Date    SODIUM 142 07/20/2021    POTASSIUM 4.4 07/20/2021    CHLORIDE 106 07/20/2021    CO2 23 07/20/2021    ANION 13.0 07/20/2021    GLUCOSE 142 (H) 07/20/2021    BUN 26 (H) 07/20/2021    CREATININE 1.02 07/20/2021    CALCIUM 10.0 07/20/2021    ASTSGOT 25 07/20/2021    ALTSGPT 38 07/20/2021    ALKPHOSPHAT 67 07/20/2021    TBILIRUBIN 0.5 07/20/2021    ALBUMIN 4.3 07/20/2021     AGRATIO 1.8 07/20/2021    TSHULTALEJANDRO 1.650 07/20/2021    CPKTOTAL 455 (H) 09/17/2020       ASSESSMENT AND PLAN  Patient Type, check all that apply:   Secondary Prevention  Established Atherosclerotic Cardiovascular Disease (ASCVD)  Yes, Details: CAD s/p stent in 2012  Other Established (non-atherosclerotic) Vascular Disease, if Present:    Hypertension, Type 2 Diabetes  Evidence of Heterozygous Familial Hypercholesterolemia (FH): Unknown (If yes, how was diagnosis made)  ACC/AHA Indication for Statin Therapy, crystal all that apply:   Established ASCVD: Indication for High intensity statin    Calculated Risk for ASCVD, if applicable:  N/A  Other Significant Risk Markers, if any, crystal all that apply:  None  National Lipid Association (NLA) Goal (if applicable):  LDL-C:   <70 mg/dL   n-HDL < 100 mg/dL  Lifestyle Recommendations From Today’s Visit:   Eating Plan: Concentrate on  DASH/Med Style diet. Patient will focus on trying to eat smaller portions to help reduce overall calorie intake.  Exercise: Continue with physical therapy, bike riding, and weight exercises. Patient plans to keep up his active lifestyle.  Statin Recommendations from Today's Visit  None - patient cannot tolerate statin therapy.  Non-Statin Medications Recommendations from Today’s Visit:   Increase Praluent to 150mg SC [id number]  Indication for PCSK9 Inhibitor, if applicable:  ASCVD with suboptimal control of LDL-C despite maximally tolerated statin  Supplements Recommended at this visit:  None  Recommendations for Other Cardiovascular Risk Factors, crystal all that apply:   Hypertension- Continue reduced salt diet and with antihypertensive pharmacotherapy. Encouraged weight loss by eating smaller portions.   Diabetes - Continue with active lifestyle and increasing vegetable and decreasing carb intake. Continue with antidiabetic pharmacotherapy.  Other Issues:  At the last visit pt decided to try a decreased dose of alpha lipoic acid, which proved  successful in improving his lipid panel with a decrease in LDL and non-HDL, but a resultant increase in TG's. However, pt feels that the reduced dose of alpha lipoic acid is suboptimal and he would like to increase his dose. Through shared decision making we decided to increase his alpha lipoic acid back to 1200mg and also increase his Praluent to 150mg SC [id number] and follow up in 8 weeks.    Studies Ordered at Todays Visit:  None   Blood Work Ordered At Today’s visit:   Lipid panel and BMP  Follow-Up:   8 weeks    Ramsey Valdez, PoojaD   Cayetano Vazquez, PoojaD    CC:  Dr Bloch Cecilia G O'Dowd, M.D. Wilson, Christopher MD

## 2021-07-30 NOTE — PROGRESS NOTES
Agree with above clinical pharmacist note  If nonHDL remains above 100 at next visit and/or trigs above 150 or so, would consider addition of vascepa.    Michael J Bloch, MD  Certified Clinical Lipid Specialist  Medial Director, [facility]    Cc:

## 2021-08-16 RX ORDER — MELOXICAM 7.5 MG/1
TABLET ORAL
Qty: 180 TABLET | Refills: 3 | Status: SHIPPED | OUTPATIENT
Start: 2021-08-16 | End: 2023-01-24

## 2021-08-19 RX ORDER — LEVOTHYROXINE SODIUM 0.07 MG/1
75 TABLET ORAL
Qty: 90 TABLET | Refills: 3 | Status: SHIPPED | OUTPATIENT
Start: 2021-08-19 | End: 2022-08-16

## 2021-09-08 ENCOUNTER — OFFICE VISIT (OUTPATIENT)
Dept: CARDIOLOGY | Facility: MEDICAL CENTER | Age: 76
End: 2021-09-08
Payer: MEDICARE

## 2021-09-08 VITALS
DIASTOLIC BLOOD PRESSURE: 74 MMHG | BODY MASS INDEX: 36.73 KG/M2 | WEIGHT: 262.4 LBS | OXYGEN SATURATION: 93 % | HEIGHT: 71 IN | HEART RATE: 77 BPM | SYSTOLIC BLOOD PRESSURE: 136 MMHG | RESPIRATION RATE: 16 BRPM

## 2021-09-08 DIAGNOSIS — R74.8 ELEVATED CPK: ICD-10-CM

## 2021-09-08 DIAGNOSIS — I25.83 CORONARY ATHEROSCLEROSIS DUE TO LIPID RICH PLAQUE: Chronic | ICD-10-CM

## 2021-09-08 DIAGNOSIS — I25.10 CORONARY ATHEROSCLEROSIS DUE TO LIPID RICH PLAQUE: Chronic | ICD-10-CM

## 2021-09-08 DIAGNOSIS — I10 ESSENTIAL HYPERTENSION: ICD-10-CM

## 2021-09-08 DIAGNOSIS — Z95.5 STENTED CORONARY ARTERY: ICD-10-CM

## 2021-09-08 DIAGNOSIS — E78.5 DYSLIPIDEMIA: ICD-10-CM

## 2021-09-08 PROCEDURE — 99214 OFFICE O/P EST MOD 30 MIN: CPT | Performed by: INTERNAL MEDICINE

## 2021-09-08 ASSESSMENT — ENCOUNTER SYMPTOMS
CHILLS: 0
PND: 0
BLURRED VISION: 0
FOCAL WEAKNESS: 0
FALLS: 0
FEVER: 0
BRUISES/BLEEDS EASILY: 0
SORE THROAT: 0
SHORTNESS OF BREATH: 0
CLAUDICATION: 0
COUGH: 0
ABDOMINAL PAIN: 0
PALPITATIONS: 0
WEAKNESS: 0
DIZZINESS: 0
NAUSEA: 0

## 2021-09-08 ASSESSMENT — FIBROSIS 4 INDEX: FIB4 SCORE: 1.92

## 2021-09-08 NOTE — PROGRESS NOTES
"Chief Complaint   Patient presents with   • Coronary Artery Disease     Dx: Coronary arteriosclerosis due to lipid rich plaque   • Dyslipidemia     Dx: Dyslipidemia       Subjective     Myke Steele is a 75 y.o. male who presents today for follow-up of his history of coronary status post stenting    His dyslipidemia is on PCSK9 given elevated CPKs, he is also on other supplements    Is doing well over the last year since I last saw him tolerating his medications well    Past Medical History:   Diagnosis Date   • Anesthesia 04/07/15    Has never had general anesthesia   • Basal cell adenocarcinoma    • Benign localized hyperplasia of prostate with urinary obstruction and other lower urinary tract symptoms (LUTS)(600.21) 8/9/2012   • Benign prostatic hypertrophy    • Class 2 severe obesity with serious comorbidity and body mass index (BMI) of 38.0 to 38.9 in adult (HCC) 8/31/2018   • Coronary arteriosclerosis due to lipid rich plaque    • Coronary atherosclerosis of native coronary artery 8/9/2012   • Elevated CPK    • Essential hypertriglyceridemia 8/9/2012   • High cholesterol    • Hypercholesterolemia 8/9/2012   • Hypertension    • Hypertriglyceridemia    • Lumbar spinal stenosis    • Obesity 8/28/2014   • Pain 04/07/15    Bilateral lower back, radiating down buttocks=\"aches\" worse with activity   • Stented coronary artery 8/9/2012   • Unspecified cataract     R IOL   • Unspecified disorder of thyroid      Past Surgical History:   Procedure Laterality Date   • LUMBAR LAMINECTOMY DISKECTOMY  4/29/2015    Performed by Juan Pablo Betts M.D. at [facility] ORS   • OTHER CARDIAC SURGERY  2008    2 Cardiac stents   • STAPEDECTOMY      bilateral ears; only worked on the R ear     Family History   Problem Relation Age of Onset   • Heart Failure Father    • Heart Disease Father         valvular   • Hypertension Mother    • Hyperlipidemia Mother    • Hyperlipidemia Brother    • Cancer Brother         skin "     Social History     Socioeconomic History   • Marital status:      Spouse name: Not on file   • Number of children: Not on file   • Years of education: Not on file   • Highest education level: Not on file   Occupational History   • Not on file   Tobacco Use   • Smoking status: Never Smoker   • Smokeless tobacco: Never Used   Vaping Use   • Vaping Use: Never used   Substance and Sexual Activity   • Alcohol use: Yes     Comment: 2-3/week   • Drug use: No   • Sexual activity: Not on file     Comment: , 2 daughters, retired    Other Topics Concern   • Not on file   Social History Narrative    Retired,  to Adrienne, 2 daughters     Social Determinants of Health     Financial Resource Strain:    • Difficulty of Paying Living Expenses:    Food Insecurity:    • Worried About Running Out of Food in the Last Year:    • Ran Out of Food in the Last Year:    Transportation Needs:    • Lack of Transportation (Medical):    • Lack of Transportation (Non-Medical):    Physical Activity:    • Days of Exercise per Week:    • Minutes of Exercise per Session:    Stress:    • Feeling of Stress :    Social Connections:    • Frequency of Communication with Friends and Family:    • Frequency of Social Gatherings with Friends and Family:    • Attends Confucianist Services:    • Active Member of Clubs or Organizations:    • Attends Club or Organization Meetings:    • Marital Status:    Intimate Partner Violence:    • Fear of Current or Ex-Partner:    • Emotionally Abused:    • Physically Abused:    • Sexually Abused:      Allergies   Allergen Reactions   • Atorvastatin      Elevated CPK     Outpatient Encounter Medications as of 9/8/2021   Medication Sig Dispense Refill   • levothyroxine (SYNTHROID) 75 MCG Tab Take 1 Tablet by mouth every day. 90 Tablet 3   • meloxicam (MOBIC) 7.5 MG Tab TAKE 1 TABLET BY MOUTH 2 TIMES A DAY AS NEEDED FOR INFLAMMATION FOR UP TO 90 DAYS. 180 Tablet 3   • Alirocumab 150 MG/ML Solution  Auto-injector Inject 150 mg under the skin every 14 days. Indications: High Amount of Fats in the Blood 6 Each 3   • tadalafil (CIALIS) 5 MG tablet TAKE ONE TABLET BY MOUTH ONE TIME DAILY  90 tablet 1   • carvedilol (COREG) 12.5 MG Tab Take 1 tablet by mouth 2 times a day with meals. 180 tablet 3   • omeprazole (PRILOSEC) 20 MG delayed-release capsule Take 1 capsule by mouth every day. 90 capsule 3   • Alpha-Lipoic Acid 600 MG Tab Take 600 mg by mouth 2 times a day.     • oxybutynin SR (DITROPAN-XL) 10 MG CR tablet Take 1 tablet by mouth every day. 90 tablet 3   • lisinopril (PRINIVIL) 40 MG tablet Take 1 tablet by mouth every day. 90 tablet 3   • amLODIPine (NORVASC) 5 MG Tab Take 1 tablet by mouth every day. 90 tablet 3   • methocarbamol (ROBAXIN) 750 MG Tab TAKE 1 TAB BY MOUTH 2 TO 3 TIMES A DAY AS NEEDED FOR MUSCLE SPASM 90 Tab 1   • metFORMIN ER (GLUCOPHAGE XR) 500 MG TABLET SR 24 HR Take 2 Tabs by mouth every day. 180 Tab 3   • cyclobenzaprine (FLEXERIL) 10 MG Tab TAKE 1 TABLET BY MOUTH TWICE A DAY AS NEEDED 40 Tab 1   • betamethasone dipropionate (DIPROLENE) 0.05 % Cream Apply 1 Application to affected area(s) 2 times a day. To area on feet as needed 60 g 2   • docusate sodium (COLACE) 100 MG Cap TAKE 1 CAP BY MOUTH 2 TIMES A DAY *PAYS CASH* 180 Cap 1   • triamcinolone acetonide (KENALOG) 0.1 % Cream Apply 1 Application to affected area(s) 2 times a day. 45 g 2   • FEXOFENADINE HCL PO Take  by mouth.     • Coenzyme Q10 (CO Q-10) 100 MG Cap Take 200 mg by mouth every day.     • aspirin (ASA) 81 MG Chew Tab chewable tablet Take 162 mg by mouth every day. 100 Tab 11   • [DISCONTINUED] Flaxseed, Linseed, (FLAX SEEDS PO) Take  by mouth. (Patient not taking: Reported on 9/8/2021)     • [DISCONTINUED] LevOCARNitine (CARNITINE, L,) Powder 3 g by Does not apply route every day. (Patient not taking: Reported on 9/8/2021)       No facility-administered encounter medications on file as of 9/8/2021.     Review of Systems  "  Constitutional: Negative for chills and fever.   HENT: Negative for sore throat.    Eyes: Negative for blurred vision.   Respiratory: Negative for cough and shortness of breath.    Cardiovascular: Negative for chest pain, palpitations, claudication, leg swelling and PND.   Gastrointestinal: Negative for abdominal pain and nausea.   Musculoskeletal: Negative for falls and joint pain.   Skin: Negative for rash.   Neurological: Negative for dizziness, focal weakness and weakness.   Endo/Heme/Allergies: Does not bruise/bleed easily.              Objective     /74 (BP Location: Left arm, Patient Position: Sitting, BP Cuff Size: Adult)   Pulse 77   Resp 16   Ht 1.803 m (5' 11\")   Wt 119 kg (262 lb 6.4 oz)   SpO2 93%   BMI 36.60 kg/m²     Physical Exam  Constitutional:       General: He is not in acute distress.     Appearance: He is not diaphoretic.   Eyes:      General: No scleral icterus.  Neck:      Vascular: No JVD.   Cardiovascular:      Rate and Rhythm: Normal rate.      Heart sounds: Normal heart sounds. No murmur heard.   No friction rub. No gallop.    Pulmonary:      Effort: No respiratory distress.      Breath sounds: No wheezing or rales.   Abdominal:      General: Bowel sounds are normal.      Palpations: Abdomen is soft.   Skin:     Findings: No rash.   Neurological:      Mental Status: He is alert.            We reviewed in person the most recent labs  Recent Results (from the past 5040 hour(s))   PROSTATE SPECIFIC AG SCREENING    Collection Time: 03/05/21 11:15 AM   Result Value Ref Range    Prostatic Specific Antigen Tot 7.34 (H) 0.00 - 4.00 ng/mL   Lipid Profile    Collection Time: 05/27/21  8:30 AM   Result Value Ref Range    Cholesterol,Tot 164 100 - 199 mg/dL    Triglycerides 158 (H) 0 - 149 mg/dL    HDL 41 >=40 mg/dL    LDL 91 <100 mg/dL   POCT A1C    Collection Time: 06/15/21  9:24 AM   Result Value Ref Range    Glycohemoglobin 6.4 (A) 0.0 - 5.6 %    Internal Control Negative Valid     " Internal Control Positive Valid    TSH    Collection Time: 07/20/21  8:50 AM   Result Value Ref Range    TSH 1.650 0.380 - 5.330 uIU/mL   MICROALBUMIN CREAT RATIO URINE    Collection Time: 07/20/21  8:50 AM   Result Value Ref Range    Creatinine, Urine 209.15 mg/dL    Microalbumin, Urine Random 2.0 mg/dL    Micro Alb Creat Ratio 10 0 - 30 mg/g   HEMOGLOBIN A1C    Collection Time: 07/20/21  8:50 AM   Result Value Ref Range    Glycohemoglobin 6.2 (H) 4.0 - 5.6 %    Est Avg Glucose 131 mg/dL   Comp Metabolic Panel    Collection Time: 07/20/21  8:50 AM   Result Value Ref Range    Sodium 142 135 - 145 mmol/L    Potassium 4.4 3.6 - 5.5 mmol/L    Chloride 106 96 - 112 mmol/L    Co2 23 20 - 33 mmol/L    Anion Gap 13.0 7.0 - 16.0    Glucose 142 (H) 65 - 99 mg/dL    Bun 26 (H) 8 - 22 mg/dL    Creatinine 1.02 0.50 - 1.40 mg/dL    Calcium 10.0 8.5 - 10.5 mg/dL    AST(SGOT) 25 12 - 45 U/L    ALT(SGPT) 38 2 - 50 U/L    Alkaline Phosphatase 67 30 - 99 U/L    Total Bilirubin 0.5 0.1 - 1.5 mg/dL    Albumin 4.3 3.2 - 4.9 g/dL    Total Protein 6.7 6.0 - 8.2 g/dL    Globulin 2.4 1.9 - 3.5 g/dL    A-G Ratio 1.8 g/dL   ESTIMATED GFR    Collection Time: 07/20/21  8:50 AM   Result Value Ref Range    GFR If African American >60 >60 mL/min/1.73 m 2    GFR If Non African American >60 >60 mL/min/1.73 m 2   Lipid Profile    Collection Time: 07/20/21  8:50 AM   Result Value Ref Range    Cholesterol,Tot 146 100 - 199 mg/dL    Triglycerides 228 (H) 0 - 149 mg/dL    HDL 43 >=40 mg/dL    LDL 57 <100 mg/dL         Assessment & Plan     1. Coronary arteriosclerosis due to lipid rich plaque     2. Dyslipidemia     3. Essential hypertension     4. Stented coronary artery         Medical Decision Making: Today's Assessment/Status/Plan:        It was my pleasure to meet with Mr. Steele.    We addressed the management of hypertension at today's visit. Blood pressure is well controlled.  We specifically assessed the labs on hypertension treatment    We  addressed the management of dyslipidemia at today's visit. He is on appropriate PCSK9    Stress test and echo next year prior to appt    We discussed the importance of meaningful weight loss.    I will see Mr. Steele back in 1 year time and encouraged him to follow up with us over the phone or electronically using my MyChart as issues arise.    It is my pleasure to participate in the care of Mr. Steele.  Please do not hesitate to contact me with questions or concerns.    Raj Olvera MD PhD [facility]  Cardiologist [facility]    Please note that this dictation was created using voice recognition software. There may be errors I did not discover before finalizing the note.

## 2021-09-09 ENCOUNTER — PATIENT MESSAGE (OUTPATIENT)
Dept: INTERNAL MEDICINE | Facility: IMAGING CENTER | Age: 76
End: 2021-09-09

## 2021-09-09 DIAGNOSIS — E11.9 TYPE 2 DIABETES MELLITUS WITHOUT COMPLICATION, WITHOUT LONG-TERM CURRENT USE OF INSULIN (HCC): ICD-10-CM

## 2021-09-10 RX ORDER — METFORMIN HYDROCHLORIDE 500 MG/1
1000 TABLET, EXTENDED RELEASE ORAL DAILY
Qty: 180 TABLET | Refills: 3 | Status: SHIPPED | OUTPATIENT
Start: 2021-09-10 | End: 2022-08-30 | Stop reason: SDUPTHER

## 2021-09-14 ENCOUNTER — HOSPITAL ENCOUNTER (OUTPATIENT)
Dept: RADIOLOGY | Facility: MEDICAL CENTER | Age: 76
End: 2021-09-14
Attending: ORTHOPAEDIC SURGERY
Payer: MEDICARE

## 2021-09-14 DIAGNOSIS — M25.511 RIGHT SHOULDER PAIN, UNSPECIFIED CHRONICITY: ICD-10-CM

## 2021-09-14 PROCEDURE — 73221 MRI JOINT UPR EXTREM W/O DYE: CPT | Mod: RT,MH

## 2021-09-20 ENCOUNTER — HOSPITAL ENCOUNTER (OUTPATIENT)
Dept: LAB | Facility: MEDICAL CENTER | Age: 76
End: 2021-09-20
Attending: INTERNAL MEDICINE
Payer: MEDICARE

## 2021-09-20 DIAGNOSIS — E78.5 DYSLIPIDEMIA: ICD-10-CM

## 2021-09-20 DIAGNOSIS — R74.8 ELEVATED CPK: ICD-10-CM

## 2021-09-20 LAB
ANION GAP SERPL CALC-SCNC: 9 MMOL/L (ref 7–16)
BUN SERPL-MCNC: 20 MG/DL (ref 8–22)
CALCIUM SERPL-MCNC: 10.6 MG/DL (ref 8.5–10.5)
CHLORIDE SERPL-SCNC: 104 MMOL/L (ref 96–112)
CHOLEST SERPL-MCNC: 149 MG/DL (ref 100–199)
CK SERPL-CCNC: 285 U/L (ref 0–154)
CO2 SERPL-SCNC: 27 MMOL/L (ref 20–33)
CREAT SERPL-MCNC: 0.9 MG/DL (ref 0.5–1.4)
FASTING STATUS PATIENT QL REPORTED: NORMAL
GLUCOSE SERPL-MCNC: 144 MG/DL (ref 65–99)
HDLC SERPL-MCNC: 48 MG/DL
LDLC SERPL CALC-MCNC: 52 MG/DL
POTASSIUM SERPL-SCNC: 4.4 MMOL/L (ref 3.6–5.5)
SODIUM SERPL-SCNC: 140 MMOL/L (ref 135–145)
TRIGL SERPL-MCNC: 247 MG/DL (ref 0–149)

## 2021-09-20 PROCEDURE — 80048 BASIC METABOLIC PNL TOTAL CA: CPT

## 2021-09-20 PROCEDURE — 36415 COLL VENOUS BLD VENIPUNCTURE: CPT

## 2021-09-20 PROCEDURE — 82550 ASSAY OF CK (CPK): CPT

## 2021-09-20 PROCEDURE — 80061 LIPID PANEL: CPT

## 2021-09-24 ENCOUNTER — ANTICOAGULATION VISIT (OUTPATIENT)
Dept: VASCULAR LAB | Facility: MEDICAL CENTER | Age: 76
End: 2021-09-24
Attending: INTERNAL MEDICINE
Payer: MEDICARE

## 2021-09-24 VITALS — DIASTOLIC BLOOD PRESSURE: 75 MMHG | SYSTOLIC BLOOD PRESSURE: 135 MMHG | HEART RATE: 68 BPM

## 2021-09-24 DIAGNOSIS — E78.5 DYSLIPIDEMIA: ICD-10-CM

## 2021-09-24 DIAGNOSIS — I10 ESSENTIAL HYPERTENSION: ICD-10-CM

## 2021-09-24 PROCEDURE — 99212 OFFICE O/P EST SF 10 MIN: CPT

## 2021-09-24 NOTE — PROGRESS NOTES
[facility] - FollowUp Visit  Date of Service: 09/24/21    Myke Steele is here for follow up of dyslipidemia.    HPI  Pertinent Interval History since last visit:   Pt continues 1200 mg alpha lipoic acid daily. Reports losing 4 lbs since last visit.   Current Prescription Lipid Lowering Medications - including dose:   Statin: None  Non-Statin: Praluent 75 mg q 14 days  Current Lipid Lowering and Related Supplements:   None  Any Current Side Effects Potentially Related to Lipid Lowering therapy?   No  Current Adherence to Lipid Lowering Therapies:  Complete  Any Previous History of Statin Intolerance?   Yes, Details: Patient trialed 3 different statins previously and had associated muscle pain and weakness with each one. Patient has increased CPK levels at baseline due to an unknown cause.  Baseline Lipids Prior to Treatment:  Shown here:  LDL-C: 122       nonHDL-C: 175           HDL-C: 47       Trigs: 266        Date: 9/9/2020    SOCIAL HISTORY  Social History     Tobacco Use   Smoking Status Never Smoker   Smokeless Tobacco Never Used      Change in weight: 4 lb weight loss  Exercise habits: moderate regular exercise program   Diet: Portion control    Vitals:    09/24/21 0834   BP: 144/80   Pulse: 69      Physical Exam    DATA REVIEW  Most Recent Lipid Panel:   Lab Results   Component Value Date    CHOLSTRLTOT 149 09/20/2021    TRIGLYCERIDE 247 (H) 09/20/2021    HDL 48 09/20/2021    LDL 52 09/20/2021       Other Pertinent Blood Work:   Lab Results   Component Value Date    SODIUM 140 09/20/2021    POTASSIUM 4.4 09/20/2021    CHLORIDE 104 09/20/2021    CO2 27 09/20/2021    ANION 9.0 09/20/2021    GLUCOSE 144 (H) 09/20/2021    BUN 20 09/20/2021    CREATININE 0.90 09/20/2021    CALCIUM 10.6 (H) 09/20/2021    ASTSGOT 25 07/20/2021    ALTSGPT 38 07/20/2021    ALKPHOSPHAT 67 07/20/2021    TBILIRUBIN 0.5 07/20/2021    ALBUMIN 4.3 07/20/2021    AGRATIO 1.8 07/20/2021    TSHULTRASEN 1.650 07/20/2021     CPKTOTAL 285 (H) 09/20/2021       Other:  NA    Recent Imaging Studies:    None since last visit    ASSESSMENT AND PLAN  Patient Type, check all that apply:   Secondary Prevention  Established Atherosclerotic Cardiovascular Disease (ASCVD)  Yes, Details: CAD s/p stent in 2012  Other Established (non-atherosclerotic) Vascular Disease, if Present:    Hypertension, Type 2 Diabetes  Evidence of Heterozygous Familial Hypercholesterolemia (FH): Unknown (If yes, how was diagnosis made)  ACC/AHA Indication for Statin Therapy, crystal all that apply:   Established ASCVD: Indication for High intensity statin    Calculated Risk for ASCVD, if applicable:  N/A  Other Significant Risk Markers, if any, crystal all that apply:  None  National Lipid Association (NLA) Goal (if applicable):  LDL-C:   <70 mg/dL   n-HDL < 100 mg/dL  Lifestyle Recommendations From Today’s Visit:   Reports 6,000 steps every day  Continue using stationary bike every day for 20-30 min as he has been doing.  Continue using light weight lifts as tolerated.  Pt to continue working with PT for his exercise.    Statin Recommendations from Today's Visit  none  Non-Statin Medications Recommendations from Today’s Visit:   Continue praluent 150 mg every 2 weeks  Indication for PCSK9 Inhibitor, if applicable:  ASCVD with suboptimal control of LDL-C despite maximally tolerated statin  Supplements Recommended at this visit:  None  Recommendations for Other Cardiovascular Risk Factors, crystal all that apply:     Discussed triglycerides at length.  Discussed residual risk, however pt would like to continue with weight loss to determine if that brings triglycerides <150.  Pt will implement weight loss and exercise over the next 6 months     If Trigs are not controlled better by next visit, may be a candidate for fenofibrate or Vascepa    Continue with weight loss.  Pt reports he is losing weight through portion control.  He does report he eats more meat than he would like to, but  is moving to white meats.     Reports he does not add any salt to his foods.    Declines influenza vaccine today.     Other Issues:  Reports home BP is 130's/70 or 80's, today clinic readings support BP >130 systolic.  Will reach out to cardiology to see if pt can be seen for HTN.  He may be a good candidate for Zestoretic.    Studies Ordered at Todays Visit:  None   Blood Work Ordered At Today’s visit:   CMP, Lipid panel  Follow-Up:   6 months (earliest pt can agree to return)    Cayetano Vazquez, PharmD    CC:  Cecilia G O'Dowd, M.D. Dr Christopher Wilson Dr Bloch      Addendum:  Received authorization for PCP to refer to HTN pharmacotherapy clinic.     Cayetano Vazquez, PharmD

## 2021-09-24 NOTE — Clinical Note
Hi Dr Olvera, this mutual pt is seen for lipids by the pharmacy team.  BP has consistently been >130 systolic, pt confirms with home monitor.  Would you like the pharmacists to see the pt for HTN?  Pt might be a good candidate for zestoretic.    Thanks, Jason Dr Bloch- would this pt be a good candidate for Radiance?

## 2021-10-06 ENCOUNTER — DOCUMENTATION (OUTPATIENT)
Dept: VASCULAR LAB | Facility: MEDICAL CENTER | Age: 76
End: 2021-10-06

## 2021-10-06 NOTE — PROGRESS NOTES
[facility] and Pharmacotherapy Programs    Received HTN pharmacotheray referral  from Dr. Keira Slade M.D. on 10/5/21    We are already seeing pt for lipid pharmacotherapy, but next visit is scheduled for March 2022.    [facility] for pt to establish care with HTN clinic    Insurance: Medicare  PCP: [facility]  Locations to be seen: Any    [facility]armacotherapy [facility] at [phone], fax [phone]    Ratna Landrum, PoojaD

## 2021-10-11 ENCOUNTER — NON-PROVIDER VISIT (OUTPATIENT)
Dept: INTERNAL MEDICINE | Facility: IMAGING CENTER | Age: 76
End: 2021-10-11
Payer: MEDICARE

## 2021-10-11 DIAGNOSIS — Z23 NEED FOR VACCINATION: ICD-10-CM

## 2021-10-11 PROCEDURE — G0008 ADMIN INFLUENZA VIRUS VAC: HCPCS | Performed by: FAMILY MEDICINE

## 2021-10-11 PROCEDURE — 90662 IIV NO PRSV INCREASED AG IM: CPT | Performed by: FAMILY MEDICINE

## 2021-10-13 DIAGNOSIS — E11.65 TYPE 2 DIABETES MELLITUS WITH HYPERGLYCEMIA, UNSPECIFIED WHETHER LONG TERM INSULIN USE (HCC): ICD-10-CM

## 2021-10-18 ENCOUNTER — APPOINTMENT (OUTPATIENT)
Dept: INTERNAL MEDICINE | Facility: IMAGING CENTER | Age: 76
End: 2021-10-18
Payer: MEDICARE

## 2021-10-21 ENCOUNTER — HOSPITAL ENCOUNTER (OUTPATIENT)
Facility: MEDICAL CENTER | Age: 76
End: 2021-10-21
Attending: PHYSICIAN ASSISTANT
Payer: MEDICARE

## 2021-10-21 ENCOUNTER — NON-PROVIDER VISIT (OUTPATIENT)
Dept: INTERNAL MEDICINE | Facility: IMAGING CENTER | Age: 76
End: 2021-10-21
Payer: MEDICARE

## 2021-10-21 PROCEDURE — 84153 ASSAY OF PSA TOTAL: CPT

## 2021-10-22 LAB — PSA SERPL-MCNC: 7.15 NG/ML (ref 0–4)

## 2021-10-28 ENCOUNTER — PRE-ADMISSION TESTING (OUTPATIENT)
Dept: ADMISSIONS | Facility: MEDICAL CENTER | Age: 76
End: 2021-10-28
Attending: ORTHOPAEDIC SURGERY
Payer: MEDICARE

## 2021-10-28 RX ORDER — MIRABEGRON 50 MG/1
TABLET, FILM COATED, EXTENDED RELEASE ORAL DAILY
COMMUNITY
End: 2022-10-20

## 2021-10-29 NOTE — PREPROCEDURE INSTRUCTIONS
Pre admit apt: Pt. Instructed to continue regularly prescribed medications through day before surgery.  Instructed to take the following medications, the day of surgery, with a sip of water per anesthesia protocol: amlodipine, carvedilol, flexeril, levothyroxine, prilosec    Covid vaccinated, instructed pt to avoid large crowds and to wear a mask in public and to report any new sxs of covid/illness to surgeon.

## 2021-11-05 ENCOUNTER — PRE-ADMISSION TESTING (OUTPATIENT)
Dept: ADMISSIONS | Facility: MEDICAL CENTER | Age: 76
End: 2021-11-05
Attending: ORTHOPAEDIC SURGERY
Payer: MEDICARE

## 2021-11-05 DIAGNOSIS — Z01.812 PRE-OPERATIVE LABORATORY EXAMINATION: ICD-10-CM

## 2021-11-05 DIAGNOSIS — Z01.810 PRE-OPERATIVE CARDIOVASCULAR EXAMINATION: ICD-10-CM

## 2021-11-05 LAB
ANION GAP SERPL CALC-SCNC: 14 MMOL/L (ref 7–16)
BUN SERPL-MCNC: 21 MG/DL (ref 8–22)
CALCIUM SERPL-MCNC: 9.8 MG/DL (ref 8.4–10.2)
CHLORIDE SERPL-SCNC: 102 MMOL/L (ref 96–112)
CO2 SERPL-SCNC: 23 MMOL/L (ref 20–33)
CREAT SERPL-MCNC: 1.15 MG/DL (ref 0.5–1.4)
EKG IMPRESSION: NORMAL
ERYTHROCYTE [DISTWIDTH] IN BLOOD BY AUTOMATED COUNT: 44.4 FL (ref 35.9–50)
EST. AVERAGE GLUCOSE BLD GHB EST-MCNC: 123 MG/DL
GLUCOSE SERPL-MCNC: 113 MG/DL (ref 65–99)
HBA1C MFR BLD: 5.9 % (ref 4–5.6)
HCT VFR BLD AUTO: 44.4 % (ref 42–52)
HGB BLD-MCNC: 15 G/DL (ref 14–18)
MCH RBC QN AUTO: 31.6 PG (ref 27–33)
MCHC RBC AUTO-ENTMCNC: 33.8 G/DL (ref 33.7–35.3)
MCV RBC AUTO: 93.7 FL (ref 81.4–97.8)
PLATELET # BLD AUTO: 184 K/UL (ref 164–446)
PMV BLD AUTO: 11.3 FL (ref 9–12.9)
POTASSIUM SERPL-SCNC: 4.4 MMOL/L (ref 3.6–5.5)
RBC # BLD AUTO: 4.74 M/UL (ref 4.7–6.1)
SODIUM SERPL-SCNC: 139 MMOL/L (ref 135–145)
WBC # BLD AUTO: 11.2 K/UL (ref 4.8–10.8)

## 2021-11-05 PROCEDURE — 85027 COMPLETE CBC AUTOMATED: CPT

## 2021-11-05 PROCEDURE — U0003 INFECTIOUS AGENT DETECTION BY NUCLEIC ACID (DNA OR RNA); SEVERE ACUTE RESPIRATORY SYNDROME CORONAVIRUS 2 (SARS-COV-2) (CORONAVIRUS DISEASE [COVID-19]), AMPLIFIED PROBE TECHNIQUE, MAKING USE OF HIGH THROUGHPUT TECHNOLOGIES AS DESCRIBED BY CMS-2020-01-R: HCPCS

## 2021-11-05 PROCEDURE — 36415 COLL VENOUS BLD VENIPUNCTURE: CPT

## 2021-11-05 PROCEDURE — 80048 BASIC METABOLIC PNL TOTAL CA: CPT

## 2021-11-05 PROCEDURE — C9803 HOPD COVID-19 SPEC COLLECT: HCPCS

## 2021-11-05 PROCEDURE — U0005 INFEC AGEN DETEC AMPLI PROBE: HCPCS

## 2021-11-05 PROCEDURE — 93005 ELECTROCARDIOGRAM TRACING: CPT

## 2021-11-05 PROCEDURE — 83036 HEMOGLOBIN GLYCOSYLATED A1C: CPT

## 2021-11-05 PROCEDURE — 93010 ELECTROCARDIOGRAM REPORT: CPT | Performed by: INTERNAL MEDICINE

## 2021-11-06 LAB
SARS-COV-2 RNA RESP QL NAA+PROBE: NOTDETECTED
SPECIMEN SOURCE: NORMAL

## 2021-11-08 ENCOUNTER — APPOINTMENT (RX ONLY)
Dept: URBAN - METROPOLITAN AREA CLINIC 4 | Facility: CLINIC | Age: 76
Setting detail: DERMATOLOGY
End: 2021-11-08

## 2021-11-08 DIAGNOSIS — L81.4 OTHER MELANIN HYPERPIGMENTATION: ICD-10-CM

## 2021-11-08 DIAGNOSIS — Z85.828 PERSONAL HISTORY OF OTHER MALIGNANT NEOPLASM OF SKIN: ICD-10-CM | Status: STABLE

## 2021-11-08 DIAGNOSIS — Z71.89 OTHER SPECIFIED COUNSELING: ICD-10-CM

## 2021-11-08 DIAGNOSIS — L57.0 ACTINIC KERATOSIS: ICD-10-CM

## 2021-11-08 DIAGNOSIS — D485 NEOPLASM OF UNCERTAIN BEHAVIOR OF SKIN: ICD-10-CM

## 2021-11-08 DIAGNOSIS — L82.1 OTHER SEBORRHEIC KERATOSIS: ICD-10-CM

## 2021-11-08 DIAGNOSIS — D22 MELANOCYTIC NEVI: ICD-10-CM

## 2021-11-08 DIAGNOSIS — D18.0 HEMANGIOMA: ICD-10-CM

## 2021-11-08 PROBLEM — D22.62 MELANOCYTIC NEVI OF LEFT UPPER LIMB, INCLUDING SHOULDER: Status: ACTIVE | Noted: 2021-11-08

## 2021-11-08 PROBLEM — D18.01 HEMANGIOMA OF SKIN AND SUBCUTANEOUS TISSUE: Status: ACTIVE | Noted: 2021-11-08

## 2021-11-08 PROBLEM — D48.5 NEOPLASM OF UNCERTAIN BEHAVIOR OF SKIN: Status: ACTIVE | Noted: 2021-11-08

## 2021-11-08 PROBLEM — D22.5 MELANOCYTIC NEVI OF TRUNK: Status: ACTIVE | Noted: 2021-11-08

## 2021-11-08 PROCEDURE — ? OBSERVATION AND MEASURE

## 2021-11-08 PROCEDURE — 99213 OFFICE O/P EST LOW 20 MIN: CPT | Mod: 25

## 2021-11-08 PROCEDURE — ? BIOPSY BY SHAVE METHOD

## 2021-11-08 PROCEDURE — ? SUNSCREEN TREATMENT REGIMEN

## 2021-11-08 PROCEDURE — 11102 TANGNTL BX SKIN SINGLE LES: CPT

## 2021-11-08 PROCEDURE — ? LIQUID NITROGEN

## 2021-11-08 PROCEDURE — 17000 DESTRUCT PREMALG LESION: CPT | Mod: 59

## 2021-11-08 PROCEDURE — 11103 TANGNTL BX SKIN EA SEP/ADDL: CPT

## 2021-11-08 PROCEDURE — ? COUNSELING

## 2021-11-08 PROCEDURE — ? SUNSCREEN RECOMMENDATIONS

## 2021-11-08 ASSESSMENT — LOCATION SIMPLE DESCRIPTION DERM
LOCATION SIMPLE: LEFT FOREARM
LOCATION SIMPLE: LEFT PRETIBIAL REGION
LOCATION SIMPLE: LEFT CHEEK
LOCATION SIMPLE: ABDOMEN
LOCATION SIMPLE: LEFT UPPER ARM
LOCATION SIMPLE: CHEST
LOCATION SIMPLE: RIGHT HAND
LOCATION SIMPLE: RIGHT UPPER BACK
LOCATION SIMPLE: LEFT FOREHEAD
LOCATION SIMPLE: LEFT HAND

## 2021-11-08 ASSESSMENT — LOCATION ZONE DERM
LOCATION ZONE: LEG
LOCATION ZONE: TRUNK
LOCATION ZONE: FACE
LOCATION ZONE: HAND
LOCATION ZONE: ARM

## 2021-11-08 ASSESSMENT — LOCATION DETAILED DESCRIPTION DERM
LOCATION DETAILED: LEFT INFERIOR CENTRAL MALAR CHEEK
LOCATION DETAILED: LEFT PROXIMAL DORSAL FOREARM
LOCATION DETAILED: LEFT DISTAL RADIAL DORSAL FOREARM
LOCATION DETAILED: STERNUM
LOCATION DETAILED: LEFT DISTAL PRETIBIAL REGION
LOCATION DETAILED: EPIGASTRIC SKIN
LOCATION DETAILED: LEFT LATERAL PROXIMAL UPPER ARM
LOCATION DETAILED: PERIUMBILICAL SKIN
LOCATION DETAILED: LEFT FOREHEAD
LOCATION DETAILED: LEFT DISTAL DORSAL FOREARM
LOCATION DETAILED: RIGHT RADIAL DORSAL HAND
LOCATION DETAILED: RIGHT SUPERIOR MEDIAL UPPER BACK
LOCATION DETAILED: LEFT ULNAR DORSAL HAND

## 2021-11-08 NOTE — PROCEDURE: LIQUID NITROGEN
Render Note In Bullet Format When Appropriate: No
Post-Care Instructions: I reviewed with the patient in detail post-care instructions. Patient is to wear sunprotection, and avoid picking at any of the treated lesions. Pt may apply Vaseline to crusted or scabbing areas.
Duration Of Freeze Thaw-Cycle (Seconds): 5
Show Applicator Variable?: Yes
Detail Level: Detailed
Consent: The patient's consent was obtained including but not limited to risks of crusting, scabbing, blistering, scarring, darker or lighter pigmentary change, recurrence, incomplete removal and infection.
Number Of Freeze-Thaw Cycles: 2 freeze-thaw cycles

## 2021-11-12 ENCOUNTER — HOSPITAL ENCOUNTER (OUTPATIENT)
Facility: MEDICAL CENTER | Age: 76
End: 2021-11-12
Attending: ORTHOPAEDIC SURGERY | Admitting: ORTHOPAEDIC SURGERY
Payer: MEDICARE

## 2021-11-12 ENCOUNTER — ANESTHESIA EVENT (OUTPATIENT)
Dept: SURGERY | Facility: MEDICAL CENTER | Age: 76
End: 2021-11-12
Payer: MEDICARE

## 2021-11-12 ENCOUNTER — ANESTHESIA (OUTPATIENT)
Dept: SURGERY | Facility: MEDICAL CENTER | Age: 76
End: 2021-11-12
Payer: MEDICARE

## 2021-11-12 VITALS
BODY MASS INDEX: 36.76 KG/M2 | TEMPERATURE: 97.2 F | OXYGEN SATURATION: 92 % | HEIGHT: 71 IN | SYSTOLIC BLOOD PRESSURE: 140 MMHG | RESPIRATION RATE: 16 BRPM | HEART RATE: 76 BPM | WEIGHT: 262.57 LBS | DIASTOLIC BLOOD PRESSURE: 80 MMHG

## 2021-11-12 LAB — GLUCOSE BLD-MCNC: 132 MG/DL (ref 65–99)

## 2021-11-12 PROCEDURE — 64415 NJX AA&/STRD BRCH PLXS IMG: CPT | Performed by: ORTHOPAEDIC SURGERY

## 2021-11-12 PROCEDURE — 700111 HCHG RX REV CODE 636 W/ 250 OVERRIDE (IP): Performed by: ANESTHESIOLOGY

## 2021-11-12 PROCEDURE — 502581 HCHG PACK, SHOULDER ARTHROSCOPY: Performed by: ORTHOPAEDIC SURGERY

## 2021-11-12 PROCEDURE — 160048 HCHG OR STATISTICAL LEVEL 1-5: Performed by: ORTHOPAEDIC SURGERY

## 2021-11-12 PROCEDURE — 700105 HCHG RX REV CODE 258: Performed by: ANESTHESIOLOGY

## 2021-11-12 PROCEDURE — 700111 HCHG RX REV CODE 636 W/ 250 OVERRIDE (IP): Performed by: ORTHOPAEDIC SURGERY

## 2021-11-12 PROCEDURE — 700101 HCHG RX REV CODE 250: Performed by: ANESTHESIOLOGY

## 2021-11-12 PROCEDURE — 160036 HCHG PACU - EA ADDL 30 MINS PHASE I: Performed by: ORTHOPAEDIC SURGERY

## 2021-11-12 PROCEDURE — 160041 HCHG SURGERY MINUTES - EA ADDL 1 MIN LEVEL 4: Performed by: ORTHOPAEDIC SURGERY

## 2021-11-12 PROCEDURE — 82962 GLUCOSE BLOOD TEST: CPT

## 2021-11-12 PROCEDURE — 501838 HCHG SUTURE GENERAL: Performed by: ORTHOPAEDIC SURGERY

## 2021-11-12 PROCEDURE — 160029 HCHG SURGERY MINUTES - 1ST 30 MINS LEVEL 4: Performed by: ORTHOPAEDIC SURGERY

## 2021-11-12 PROCEDURE — 160002 HCHG RECOVERY MINUTES (STAT): Performed by: ORTHOPAEDIC SURGERY

## 2021-11-12 PROCEDURE — 160009 HCHG ANES TIME/MIN: Performed by: ORTHOPAEDIC SURGERY

## 2021-11-12 PROCEDURE — C1713 ANCHOR/SCREW BN/BN,TIS/BN: HCPCS | Performed by: ORTHOPAEDIC SURGERY

## 2021-11-12 PROCEDURE — 502000 HCHG MISC OR IMPLANTS RC 0278: Performed by: ORTHOPAEDIC SURGERY

## 2021-11-12 PROCEDURE — 160035 HCHG PACU - 1ST 60 MINS PHASE I: Performed by: ORTHOPAEDIC SURGERY

## 2021-11-12 PROCEDURE — 160025 RECOVERY II MINUTES (STATS): Performed by: ORTHOPAEDIC SURGERY

## 2021-11-12 PROCEDURE — 160046 HCHG PACU - 1ST 60 MINS PHASE II: Performed by: ORTHOPAEDIC SURGERY

## 2021-11-12 DEVICE — GRAFT SOFT TISSUE DERMAL MATRIX ALLOMEND 4X8CM: Type: IMPLANTABLE DEVICE | Site: SHOULDER | Status: FUNCTIONAL

## 2021-11-12 DEVICE — ANCHOR KNOTLESS REELX STT 4.5MM (5EA/BX): Type: IMPLANTABLE DEVICE | Site: SHOULDER | Status: FUNCTIONAL

## 2021-11-12 DEVICE — ANCHOR SUTURE OMEGA PEEK OD4.75 MM 2 KNOTLESS STERILE LATEX FREE: Type: IMPLANTABLE DEVICE | Site: SHOULDER | Status: FUNCTIONAL

## 2021-11-12 RX ORDER — ROPIVACAINE HYDROCHLORIDE 5 MG/ML
INJECTION, SOLUTION EPIDURAL; INFILTRATION; PERINEURAL
Status: COMPLETED | OUTPATIENT
Start: 2021-11-12 | End: 2021-11-12

## 2021-11-12 RX ORDER — SODIUM CHLORIDE, SODIUM LACTATE, POTASSIUM CHLORIDE, CALCIUM CHLORIDE 600; 310; 30; 20 MG/100ML; MG/100ML; MG/100ML; MG/100ML
INJECTION, SOLUTION INTRAVENOUS CONTINUOUS
Status: DISCONTINUED | OUTPATIENT
Start: 2021-11-12 | End: 2021-11-12 | Stop reason: HOSPADM

## 2021-11-12 RX ORDER — SODIUM CHLORIDE, SODIUM LACTATE, POTASSIUM CHLORIDE, CALCIUM CHLORIDE 600; 310; 30; 20 MG/100ML; MG/100ML; MG/100ML; MG/100ML
INJECTION, SOLUTION INTRAVENOUS CONTINUOUS
Status: ACTIVE | OUTPATIENT
Start: 2021-11-12 | End: 2021-11-12

## 2021-11-12 RX ORDER — ROCURONIUM BROMIDE 10 MG/ML
INJECTION, SOLUTION INTRAVENOUS PRN
Status: DISCONTINUED | OUTPATIENT
Start: 2021-11-12 | End: 2021-11-12 | Stop reason: SURG

## 2021-11-12 RX ORDER — DEXAMETHASONE SODIUM PHOSPHATE 4 MG/ML
INJECTION, SOLUTION INTRA-ARTICULAR; INTRALESIONAL; INTRAMUSCULAR; INTRAVENOUS; SOFT TISSUE PRN
Status: DISCONTINUED | OUTPATIENT
Start: 2021-11-12 | End: 2021-11-12 | Stop reason: SURG

## 2021-11-12 RX ORDER — OXYCODONE HCL 5 MG/5 ML
5 SOLUTION, ORAL ORAL
Status: DISCONTINUED | OUTPATIENT
Start: 2021-11-12 | End: 2021-11-12 | Stop reason: HOSPADM

## 2021-11-12 RX ORDER — HALOPERIDOL 5 MG/ML
1 INJECTION INTRAMUSCULAR
Status: DISCONTINUED | OUTPATIENT
Start: 2021-11-12 | End: 2021-11-12 | Stop reason: HOSPADM

## 2021-11-12 RX ORDER — EPINEPHRINE 1 MG/ML(1)
AMPUL (ML) INJECTION
Status: DISCONTINUED | OUTPATIENT
Start: 2021-11-12 | End: 2021-11-12 | Stop reason: HOSPADM

## 2021-11-12 RX ORDER — HYDROMORPHONE HYDROCHLORIDE 1 MG/ML
0.2 INJECTION, SOLUTION INTRAMUSCULAR; INTRAVENOUS; SUBCUTANEOUS
Status: DISCONTINUED | OUTPATIENT
Start: 2021-11-12 | End: 2021-11-12 | Stop reason: HOSPADM

## 2021-11-12 RX ORDER — MEPERIDINE HYDROCHLORIDE 25 MG/ML
6.25 INJECTION INTRAMUSCULAR; INTRAVENOUS; SUBCUTANEOUS
Status: DISCONTINUED | OUTPATIENT
Start: 2021-11-12 | End: 2021-11-12 | Stop reason: HOSPADM

## 2021-11-12 RX ORDER — CEFAZOLIN SODIUM 1 G/3ML
INJECTION, POWDER, FOR SOLUTION INTRAMUSCULAR; INTRAVENOUS PRN
Status: DISCONTINUED | OUTPATIENT
Start: 2021-11-12 | End: 2021-11-12 | Stop reason: SURG

## 2021-11-12 RX ORDER — DIPHENHYDRAMINE HYDROCHLORIDE 50 MG/ML
12.5 INJECTION INTRAMUSCULAR; INTRAVENOUS
Status: DISCONTINUED | OUTPATIENT
Start: 2021-11-12 | End: 2021-11-12 | Stop reason: HOSPADM

## 2021-11-12 RX ORDER — MIDAZOLAM HYDROCHLORIDE 1 MG/ML
INJECTION INTRAMUSCULAR; INTRAVENOUS PRN
Status: DISCONTINUED | OUTPATIENT
Start: 2021-11-12 | End: 2021-11-12 | Stop reason: SURG

## 2021-11-12 RX ORDER — SODIUM CHLORIDE, SODIUM LACTATE, POTASSIUM CHLORIDE, CALCIUM CHLORIDE 600; 310; 30; 20 MG/100ML; MG/100ML; MG/100ML; MG/100ML
INJECTION, SOLUTION INTRAVENOUS
Status: DISCONTINUED | OUTPATIENT
Start: 2021-11-12 | End: 2021-11-12 | Stop reason: SURG

## 2021-11-12 RX ORDER — OXYCODONE HCL 5 MG/5 ML
10 SOLUTION, ORAL ORAL
Status: DISCONTINUED | OUTPATIENT
Start: 2021-11-12 | End: 2021-11-12 | Stop reason: HOSPADM

## 2021-11-12 RX ORDER — HYDROMORPHONE HYDROCHLORIDE 1 MG/ML
0.6 INJECTION, SOLUTION INTRAMUSCULAR; INTRAVENOUS; SUBCUTANEOUS
Status: DISCONTINUED | OUTPATIENT
Start: 2021-11-12 | End: 2021-11-12 | Stop reason: HOSPADM

## 2021-11-12 RX ORDER — LIDOCAINE HYDROCHLORIDE 40 MG/ML
SOLUTION TOPICAL PRN
Status: DISCONTINUED | OUTPATIENT
Start: 2021-11-12 | End: 2021-11-12 | Stop reason: SURG

## 2021-11-12 RX ORDER — HYDROMORPHONE HYDROCHLORIDE 1 MG/ML
0.4 INJECTION, SOLUTION INTRAMUSCULAR; INTRAVENOUS; SUBCUTANEOUS
Status: DISCONTINUED | OUTPATIENT
Start: 2021-11-12 | End: 2021-11-12 | Stop reason: HOSPADM

## 2021-11-12 RX ORDER — ONDANSETRON 2 MG/ML
4 INJECTION INTRAMUSCULAR; INTRAVENOUS
Status: DISCONTINUED | OUTPATIENT
Start: 2021-11-12 | End: 2021-11-12 | Stop reason: HOSPADM

## 2021-11-12 RX ORDER — ONDANSETRON 2 MG/ML
INJECTION INTRAMUSCULAR; INTRAVENOUS PRN
Status: DISCONTINUED | OUTPATIENT
Start: 2021-11-12 | End: 2021-11-12 | Stop reason: SURG

## 2021-11-12 RX ADMIN — SUGAMMADEX 200 MG: 100 INJECTION, SOLUTION INTRAVENOUS at 09:07

## 2021-11-12 RX ADMIN — LIDOCAINE HYDROCHLORIDE 4 ML: 40 SOLUTION TOPICAL at 07:03

## 2021-11-12 RX ADMIN — PROPOFOL 300 MG: 10 INJECTION, EMULSION INTRAVENOUS at 07:02

## 2021-11-12 RX ADMIN — FENTANYL CITRATE 50 MCG: 50 INJECTION, SOLUTION INTRAMUSCULAR; INTRAVENOUS at 07:02

## 2021-11-12 RX ADMIN — ROCURONIUM BROMIDE 50 MG: 10 INJECTION, SOLUTION INTRAVENOUS at 07:02

## 2021-11-12 RX ADMIN — EPHEDRINE SULFATE 10 MG: 50 INJECTION INTRAMUSCULAR; INTRAVENOUS; SUBCUTANEOUS at 07:33

## 2021-11-12 RX ADMIN — ROPIVACAINE HYDROCHLORIDE 30 ML: 5 INJECTION, SOLUTION EPIDURAL; INFILTRATION; PERINEURAL at 06:35

## 2021-11-12 RX ADMIN — FENTANYL CITRATE 50 MCG: 50 INJECTION, SOLUTION INTRAMUSCULAR; INTRAVENOUS at 08:05

## 2021-11-12 RX ADMIN — DEXAMETHASONE SODIUM PHOSPHATE 4 MG: 4 INJECTION, SOLUTION INTRAMUSCULAR; INTRAVENOUS at 09:03

## 2021-11-12 RX ADMIN — CEFAZOLIN 3 G: 330 INJECTION, POWDER, FOR SOLUTION INTRAMUSCULAR; INTRAVENOUS at 07:03

## 2021-11-12 RX ADMIN — SODIUM CHLORIDE, POTASSIUM CHLORIDE, SODIUM LACTATE AND CALCIUM CHLORIDE: 600; 310; 30; 20 INJECTION, SOLUTION INTRAVENOUS at 06:57

## 2021-11-12 RX ADMIN — ONDANSETRON 4 MG: 2 INJECTION INTRAMUSCULAR; INTRAVENOUS at 09:03

## 2021-11-12 RX ADMIN — MIDAZOLAM HYDROCHLORIDE 2 MG: 1 INJECTION, SOLUTION INTRAMUSCULAR; INTRAVENOUS at 07:01

## 2021-11-12 ASSESSMENT — PAIN SCALES - GENERAL: PAIN_LEVEL: 0

## 2021-11-12 ASSESSMENT — FIBROSIS 4 INDEX: FIB4 SCORE: 1.63

## 2021-11-12 NOTE — ANESTHESIA POSTPROCEDURE EVALUATION
Patient: Myke Steele    Procedure Summary     Date: 11/12/21 Room / Location:  OR  / SURGERY [facility]    Anesthesia Start: 0659 Anesthesia Stop: 0923    Procedures:       DECOMPRESSION, SHOULDER, ARTHROSCOPIC - SUBACROMIAL (Right Shoulder)      ARTHROSCOPIC LABRAL DEBRIDEMENT (Right Shoulder)      ARTHROSCOPY, SHOULDER, WITH ROTATOR CUFF REPAIR - SUPERIOR CAPSULAR RECONSTRUCTION, REPAIRS AS INDICATED (Right Shoulder) Diagnosis: (COMPLETE ROTATOR CUFF TEAR OR RUPTURE OF RIGHT SHOULDER)    Surgeons: Radha Brothers M.D. Responsible Provider: Miguel Hussein M.D.    Anesthesia Type: general, peripheral nerve block ASA Status: 3          Final Anesthesia Type: general, peripheral nerve block  Last vitals  BP   Blood Pressure : (!) 171/82    Temp   36.5 °C (97.7 °F)    Pulse   80   Resp   16    SpO2   93 %      Anesthesia Post Evaluation    Patient location during evaluation: PACU  Patient participation: complete - patient participated  Level of consciousness: awake and alert  Pain score: 0    Airway patency: patent  Anesthetic complications: no  Cardiovascular status: hemodynamically stable  Respiratory status: acceptable  Hydration status: euvolemic    PONV: none          No complications documented.     Nurse Pain Score: 1 (NPRS)

## 2021-11-12 NOTE — OR SURGEON
Immediate Post OP Note    PreOp Diagnosis: Right shoulder impingement, OA, labral tear, rotator cuff tear      PostOp Diagnosis: same      Procedure(s): Right  DECOMPRESSION, SHOULDER, ARTHROSCOPIC - SUBACROMIAL - Wound Class: Clean  ARTHROSCOPIC LABRAL DEBRIDEMENT - Wound Class: Clean  ARTHROSCOPY, SHOULDER, WITH ROTATOR CUFF REPAIR - SUPERIOR CAPSULAR RECONSTRUCTION - Wound Class: Clean    Surgeon(s):  Radha Brothers M.D.    Anesthesiologist/Type of Anesthesia:  Anesthesiologist: Miguel Hussein M.D./General    Surgical Staff:  Circulator: Brenda Chand R.N.  Relief Circulator: Monica Haley R.N.  Scrub Person: Najma Deleon Assist: Tato Terry C.N.APayam    Specimens removed if any:  * No specimens in log *    Estimated Blood Loss: minimal    Findings: as above    Complications: none        11/12/2021 9:31 AM Yumiko Hernández P.A.-C.

## 2021-11-12 NOTE — ANESTHESIA PROCEDURE NOTES
Airway    Date/Time: 11/12/2021 7:02 AM  Performed by: Miguel Hussein M.D.  Authorized by: Miguel Hussein M.D.     Location:  OR  Urgency:  Elective  Indications for Airway Management:  Anesthesia      Spontaneous Ventilation: absent    Sedation Level:  Deep  Preoxygenated: Yes    Patient Position:  Sniffing  Final Airway Type:  Endotracheal airway  Final Endotracheal Airway:  ETT  Cuffed: Yes    Technique Used for Successful ETT Placement:  Direct laryngoscopy    Insertion Site:  Oral  Blade Type:  Taylor  Laryngoscope Blade/Videolaryngoscope Blade Size:  3  ETT Size (mm):  7.5  Measured from:  Teeth  ETT to Teeth (cm):  23  Placement Verified by: auscultation and capnometry    Cormack-Lehane Classification:  Grade I - full view of glottis  Number of Attempts at Approach:  1  Ventilation Between Attempts:  BVM

## 2021-11-12 NOTE — OR NURSING
0924- Pt to PACU from OR s/p R shoulder decompression. Report received. Respirations even and unlabored. Pt mostly awake, follows commands. VSS, refer to flowsheets. Radial pulses equal bilaterally and CMS intact. Surgical dressing CDI. R arm in immobilizer, cold pack applied.  9353- Report to Gosia STORM.

## 2021-11-12 NOTE — ANESTHESIA TIME REPORT
Anesthesia Start and Stop Event Times     Date Time Event    11/12/2021 0623 Ready for Procedure     0659 Anesthesia Start     0923 Anesthesia Stop        Responsible Staff  11/12/21    Name Role Begin End    Miguel Hussein M.D. Anesth 0659 0923        Preop Diagnosis (Free Text):  Pre-op Diagnosis     COMPLETE ROTATOR CUFF TEAR OR RUPTURE OF RIGHT SHOULDER        Preop Diagnosis (Codes):    Premium Reason  A. 3PM - 7AM    Comments: pre 7AM block marcio premium

## 2021-11-12 NOTE — DISCHARGE INSTRUCTIONS
ACTIVITY: Rest and take it easy for the first 24 hours.  A responsible adult is recommended to remain with you during that time.  It is normal to feel sleepy.  We encourage you to not do anything that requires balance, judgment or coordination.    MILD FLU-LIKE SYMPTOMS ARE NORMAL. YOU MAY EXPERIENCE GENERALIZED MUSCLE ACHES, THROAT IRRITATION, HEADACHE AND/OR SOME NAUSEA.    FOR 24 HOURS DO NOT:  Drive, operate machinery or run household appliances.  Drink beer or alcoholic beverages.   Make important decisions or sign legal documents.    SPECIAL INSTRUCTIONS:   Post-Operative Instructions - Shoulder Arthroscopy       Dressing and wound care: Keep your shoulder dressing clean and dry after surgery.  Be aware that some leaking of blood or fluid from your dressing can occur and is normal. You may remove your dressing 3 days after the operation.  Notice that you have a single incision and/or several small incisions that have been closed with sutures. Cover each of these incisions with a light dressing or band-aids.  This keeps the surgical incisions clean, as well as preventing your clothes from spotting with blood or fluid.  Change band-aids or light dressing daily.     Shower / bathing: Keep the shoulder dry for 3 days after your surgery.  Then, you may shower. You may let soap and water run over skin incisions, but do not immerse your shoulder in water.  No swimming pools, hot tubs, or baths are recommended until at least 6 weeks after surgery.     Ice: Apply an ice pack to your shoulder (15 minutes on the shoulder, 15 minutes off the shoulder), as you feel necessary to help with the pain and swelling.   If you have a cold therapy, use liberally as directed.     Sling / Shoulder Immobilizer: The sling should be on except when bathing or doing your exercises.  You may also carefully remove the sling when awake and seated.  Be sure to support your forearm with a pillow so your shoulder remains in a relaxed and  comfortable position.  Please wear the sling while sleeping.     Activity: It is important to move your shoulder, as well as your elbow, wrist, and hand several times daily, starting the day after surgery.  You may do pendulum exercises with your operative arm starting the day after surgery.  Pendulum (dangling [place]) exercises are encouraged 3-5 times daily.  The sling will need to be removed for pendulum exercises.  You may loosen the sling to use your operative arm for light simple activities such as eating, brushing teeth, or writing/typing.  Nothing heavier than a cup of coffee in your hand on the operative side until otherwise recommended.     Pain medication: Take your pain medicine, as needed and prescribed.  Do not drive or operate machinery while taking narcotic pain medication.   You may start or resume anti-inflammatory medication (i.e. ibuprofen, naproxen) anytime after surgery, which should be taken with food to avoid stomach irritation.     Driving: You may drive as soon as you are off narcotic pain medication and feel comfortable behind the wheel.  Loosen or remove your sling, supporting your forearm on a pillow when you drive.  It is important for both hands to have access to the steering wheel for safety.     Aspirin: Take one low dose 81 mg aspirin starting the morning after surgery twice daily - one in the morning and one in the evening - for two weeks following surgery.       Problems:     If you are having problems with your shoulder (unexpected pain, excessive bleeding or discharge from your incisions, fevers/chills) do not hesitate to call the office or visit the nearest emergency room for evaluation.     Follow-up:     Make sure that you have an appointment 7-14 days following surgery.  Your stitches will be removed, and your procedure/rehabilitation will be discussed at that time.   Physical therapy may be prescribed at that time.         Radha Brothers MD   [facility]    [phone]    DIET: To avoid nausea, slowly advance diet as tolerated, avoiding spicy or greasy foods for the first day.  Add more substantial food to your diet according to your physician's instructions.   INCREASE FLUIDS AND FIBER TO AVOID CONSTIPATION.    SURGICAL DRESSING/BATHING: Refer to instructions above.    FOLLOW-UP APPOINTMENT:  A follow-up appointment should be arranged with your doctor in 7-14 days; call to schedule.    You should CALL YOUR PHYSICIAN if you develop:  Fever greater than 101 degrees F.  Pain not relieved by medication, or persistent nausea or vomiting.  Excessive bleeding (blood soaking through dressing) or unexpected drainage from the wound.  Extreme redness or swelling around the incision site, drainage of pus or foul smelling drainage.  Inability to urinate or empty your bladder within 8 hours.  Problems with breathing or chest pain.    You should call [phone] if you develop problems with breathing or chest pain.  If you are unable to contact your doctor or surgical center, you should go to the nearest emergency room or urgent care center.  Physician's telephone #: [phone]    If any questions arise, call your doctor.  If your doctor is not available, please feel free to call the Surgical Center at [phone]. The Contact Center is open Monday through Friday 7AM to 5PM and may speak to a nurse at [phone], or toll free at [phone].     A registered nurse may call you a few days after your surgery to see how you are doing after your procedure.    MEDICATIONS: Resume taking daily medication.  Take prescribed pain medication with food.  If no medication is prescribed, you may take non-aspirin pain medication if needed.  PAIN MEDICATION CAN BE VERY CONSTIPATING.  Take a stool softener or laxative such as senokot, pericolace, or milk of magnesia if needed.    Prescription given and filled pre-operatively.  Last pain medication given at ___________.    If your physician has  prescribed pain medication that includes Acetaminophen (Tylenol), do not take additional Acetaminophen (Tylenol) while taking the prescribed medication.    Depression / Suicide Risk    As you are discharged from this [facility] facility, it is important to learn how to keep safe from harming yourself.    Recognize the warning signs:  · Abrupt changes in personality, positive or negative- including increase in energy   · Giving away possessions  · Change in eating patterns- significant weight changes-  positive or negative  · Change in sleeping patterns- unable to sleep or sleeping all the time   · Unwillingness or inability to communicate  · Depression  · Unusual sadness, discouragement and loneliness  · Talk of wanting to die  · Neglect of personal appearance   · Rebelliousness- reckless behavior  · Withdrawal from people/activities they love  · Confusion- inability to concentrate     If you or a loved one observes any of these behaviors or has concerns about self-harm, here's what you can do:  · Talk about it- your feelings and reasons for harming yourself  · Remove any means that you might use to hurt yourself (examples: pills, rope, extension cords, firearm)  · Get professional help from the community (Mental Health, Substance Abuse, psychological counseling)  · Do not be alone:Call your Safe Contact- someone whom you trust who will be there for you.  · Call your local CRISIS HOTLINE 941-3095 or 485-088-3838  · Call your local Children's Mobile Crisis Response Team [place] (285) 119-8380 or www.[employer]  · Call the toll free National Suicide Prevention Hotlines   · National Suicide Prevention Lifeline 487-839-YFBC (2611)  National Hope Line Network 800-SUICIDE (597-0218)    Peripheral Nerve Block Discharge Instructions from Same Day Surgery and Inpatient :    What to Expect - Lower Extremity  · The block may cause you to experience numbness and weakness in your {LEG LOCATION PNB:816215} on the  same side as your surgery  · Numbness, tingling and / or weakness are all normal. For some people, this may be an unpleasant sensation  · These issues will be resolved when the local anesthetic wears off   · You may experience numbness and tingling in your thigh on the same side as your surgery if the block medicine was injected at your groin area  · Numbness will make it difficult to walk  · You may have problems with balance and walking so be very careful   · Follow your surgeon's direction regarding weight bearing on your surgical limb  · Be very careful with your numb limb  Precautions  · The numbness may affect your balance  · Be careful when walking or moving around  · Your leg may be weak: be very careful putting weight on it  · If your surgeon did not specify a time, you should not bear weight for 24 hours  · Be sure to ask for help when you need it  · It is better to have help than to fall and hurt yourself  What to Expect - Upper Extremity  · You may experience numbness and weakness in {ARM LOCATION PNB:160841}  on the same side as your surgery  · This is normal. For some people, this may be an unpleasant sensation. Be very careful with your numb limb  · Ask for help when you need it  Shoulder Surgery Side Effects  · In addition to numbness and weakness you may experience other symptoms  · Other nerves that are close to those nerves injected can also be affected by local anesthesia  · You may experience a hoarseness in your voice  · Your breathing may feel different  · You may also notice drooping of your eyelid, pupil constriction, and decreased sweating, on the side of your surgery  · All of these side effects are normal and will resolve when the local anesthetic wears off   Prevent Injury  · Protect the limb like a baby  · Beware of exposing your limb to extreme heat or cold or trauma  · The limb may be injured without you noticing because it is numb  · Keep the limb elevated whenever possible  · Do not  "sleep on the limb  · Change the position of the limb regularly  · Avoid putting pressure on your surgical limb  Pain Control  · The initial block on the day of surgery will make your extremity feel \"numb\"  · Any consecutive injection including prior to discharge from the hospital will make your extremity feel \"numb\"  · You may feel an aching or burning when the local anesthesia starts to wear off  · Take pain pills as prescribed by your surgeon  · Call your surgeon or anesthesiologist if you do not have adequate pain control  ·   "

## 2021-11-12 NOTE — OR NURSING
0935 assumed care of pt, awake vss, denies pain    0950 awake, resting comfortably, freq cough-states has tickle in his throat, tolerating sips po fluids    1020 awake, denies pain, states irritation in throat is improving, moving right hand/fingers-states fingers numb/tingly    1032 called report to Em rn, pt transferred to phase 2 in stable condition via chacorta garduno/cna

## 2021-11-12 NOTE — ANESTHESIA PREPROCEDURE EVALUATION
Case: [id number] Date/Time: 11/12/21 0645    Procedures:       DECOMPRESSION, SHOULDER, ARTHROSCOPIC - SUBACROMIAL (Right )      ARTHROSCOPIC LABRAL DEBRIDEMENT      ARTHROSCOPY, SHOULDER, WITH ROTATOR CUFF REPAIR - POSSIBLE SUPERIOR CAPSULAR RECONSTRUCTION, REPAIRS AS INDICATED    Pre-op diagnosis: COMPLETE ROTATOR CUFF TEAR OR RUPTURE OF RIGHT SHOULDER    Location: [facility] OR 05 / SURGERY [facility]    Surgeons: Radha Brothers M.D.          Relevant Problems   CARDIAC   (positive) Coronary arteriosclerosis due to lipid rich plaque   (positive) Essential hypertension   (positive) Stented coronary artery      ENDO   (positive) Hypothyroid   (positive) Thyroid activity decreased   (positive) Type 2 diabetes mellitus without complication, without long-term current use of insulin (HCC)       Physical Exam    Airway   Mallampati: II  TM distance: >3 FB  Neck ROM: full       Cardiovascular - normal exam  Rhythm: regular  Rate: normal  (-) murmur     Dental - normal exam           Pulmonary - normal exam  Breath sounds clear to auscultation     Abdominal    Neurological - normal exam                 Anesthesia Plan    ASA 3   ASA physical status 3 criteria: morbid obesity - BMI greater than or equal to 40    Plan - general and peripheral nerve block     Peripheral nerve block will be post-op pain control  Airway plan will be ETT          Induction: intravenous    Postoperative Plan: Postoperative administration of opioids is intended.    Pertinent diagnostic labs and testing reviewed    Informed Consent:    Anesthetic plan and risks discussed with patient.    Use of blood products discussed with: patient whom consented to blood products.

## 2021-11-12 NOTE — OR NURSING
1032: To Stage 2 from PACU.  denies nausea and pain at this time. Surgical dressing  CDI  Affected extremity capillary refill brisk less than 3 seconds.   Patient settled in recliner chair. Pt dressed with assist by CNA.   1140: Pt given an incentive spirometer. instructed to use at home 10x every hour while awake.  Able to demonstrated proper use. States he had used it before. Pt able to pull max   1055: Dr Brothers at chairside   1115: Discharge instructions and medications gone over with Pt and ride. Pt meets discharge criteria. PIV DC'd. Pt transported to [facility] via wheelchair in stable condition.

## 2021-11-12 NOTE — OP REPORT
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     PROCEDURES:  Right shoulder arthroscopy, subacromial decompression, labral   debridement, chondroplasty, arthroscopic rotator cuff repair including   subscapularis tendon, superior capsular reconstruction.     SURGEON:  Radha Brothers MD     ASSISTANT:  Tato Terry CFA     SECOND ASSISTANT:  Kartik Delgado MD (Sports Medicine Fellow).     ANESTHESIOLOGIST:  Miguel Hussein MD     TYPE OF ANESTHESIA:  General, with preoperative interscalene nerve block.     INTRAVENOUS FLUID:  1 liter crystalloid.     ESTIMATED BLOOD LOSS:  Minimal.     DRAINS:  None.     SPECIMENS:  None.     COMPLICATIONS:  None.     IMPLANTS:  [employer]oSource AlloMend acellular dermal matrix allograft, [place]   ReelX anchors x3, Omega anchors x2, Iconix anchors x2.     REASON FOR PROCEDURE:  The patient is a 76-year-old male who sustained an   injury to his right shoulder.  He tore his rotator cuff.  He failed   nonoperative measures.  We evaluated both plain x-ray as well as MRI findings   demonstrating minimal glenohumeral joint arthritis, as well as a large   retracted rotator cuff tear.  We decided to proceed with arthroscopy.     OPERATION:  The patient was given a right interscalene nerve block by the   anesthesiologist before surgery.  Once back in the operating room, a breathing   tube was placed.  He was given 3 grams of IV Ancef.  He was then placed in   the lateral decubitus position.  Care was taken to pad his axilla as well as   all bony prominences.  He was noted to have good range of motion, without   instability.  The right upper extremity  was then prepped with ChloraPrep.  It   was then placed in the Arthrex traction device.  Bony landmarks were drawn and   a standard posterior portal was established.  The arthroscope was then   inserted into the glenohumeral joint.  An anterosuperior cannula was placed in   the rotator interval, just beneath the biceps tendon.  There were some mild   areas of humeral head chondromalacia.  A smoothing chondroplasty was   performed.  There was a long head of biceps tendon tear.  The tear was noted   to be completely retracted from the joint and was thus left alone.  There was   a high-grade partial thickness upper rolled edge subscapularis tendon tear.    This was repaired by roughening the lesser tuberosity.  An 8 mm cannula was   placed anteriorly.  The [employer] device was used to take a luggage tag 2 mm   tape pass through the upper rolled edge of the subscapularis tendon, which was   torn.  The tails were placed into a PicLyf ReelX anchor, which was tapped,   then expanded, nicely repairing the upper rolled edge down to the lesser   tuberosity.  There was global labral fraying.  This was debrided.  There was a   large superior rotator cuff tear noted.  The arthroscope was then inserted   into the subacromial space.  A lateral portal was established.  There was a   copious amount of thickened, inflamed bursal tissue.  This was removed with   the 4.0 aggressive shaver.  Borders of the acromion were defined.  A 5.5 mm   resector was used to remove the anterior curve as well as lateral edge.    Portals were switched.  Using a standard cutting block technique from   posterior to anterior, a subacromial decompression was completed.  Care was   taken to maintain the integrity of the coracoacromial ligament.  There was a   substantial amount of thickened, inflamed bursal tissue.  Once this was mostly   removed at least in the lateral aspect to gain access and visualization of   the rotator cuff, there was a large  retracted supraspinatus and anterior   infraspinatus tendon tear, which was quite stiff, without enough excursion to   deem it primarily repairable to the greater tuberosity.  The infraspinatus was   repairable and was done following the superior capsular reconstruction.  A   superior capsular reconstruction was performed with acellular dermal   allograft.  First, the greater tuberosity as well as the superior glenoid rim   were both roughened.  Two percutaneous portals were created and a 2.3 mm   Iconix anchor was drilled and tapped into place in 2 different locations   medial to the glenoid rim.  The arthroscope was placed back through the   posterior portal.  Two lateral row anchors were placed just off the articular   margin.  These were both Omega anchors, the first was loaded with a 2 mm tape   and the second was loaded with 2 mm tape as well as two 1.4 mm tapes to allow   for infraspinatus repair.  The sides of this quadrant quadrilateral structure   were measured.  The arm was placed in increased abduction to allow for better   tensioning of the graft prior to measurement.  Next, a bullfrog cannula was   placed laterally.  This graft was then cut to size, leaving a 5 mm rim   peripherally other than at the lateral aspect where a 10 mm rim was left.  All   sutures from all four anchors were then pulled out of the lateral bullfrog   cannula.  The sutures were then pulled through the graft at the predetermined   location.  Using an anchor length double pulley technique, the medial aspect   of the graft was tied down to the prepared superior glenoid rim.  Otherwise,   the 2 mm tapes were crossed and placed into an anterolateral followed by a   posterolateral ReelX anchor to allow for compression of the graft and proper   tensioning on to the greater tuberosity.  Next, a side-to-side suture was then   taken with a 1.2 mm tape.  This allowed for repair of the infraspinatus to   the posterior rim of the graft.  A  modified Carlo-[place] configuration was then   used to repair the infraspinatus to the posterior aspect of the greater   tuberosity.  A final side-to-side suture was taken with a 1.2 mm tape from the   interval tissue to the anterior rim of the graft.  This allowed for complete   humeral head coverage, with the acellular dermal matrix, as well as anterior   and posterior rotator cuff repair.  The lateral aspect of the acromioplasty   was completed.  All instruments were then removed.  Portals were closed with   3-0 nylon suture.  A sterile dressing was applied.  All drapes were removed   and the arm was carefully placed into a shoulder abduction sling.  The patient   was placed supine on a stretcher and taken to recovery room, in stable   condition.        ______________________________  MD BRIANA YOUNG/SERGIO    DD:  11/12/2021 09:28  DT:  11/12/2021 09:52    Job#:  [id number]

## 2021-11-12 NOTE — ANESTHESIA PROCEDURE NOTES
Peripheral Block    Date/Time: 11/12/2021 6:35 AM  Performed by: Miguel Hussein M.D.  Authorized by: Miguel Hussein M.D.     Patient Location:  Pre-op  Start Time:  11/12/2021 6:35 AM  End Time:  11/12/2021 6:37 AM  Reason for Block: at surgeon's request and post-op pain management ONLY    patient identified, IV checked, site marked, risks and benefits discussed, surgical consent, monitors and equipment checked, pre-op evaluation and timeout performed    Patient Position:  Supine  Prep: ChloraPrep    Monitoring:  Heart rate, continuous pulse ox and cardiac monitor  Block Region:  Upper Extremity  Upper Extremity - Block Type:  BRACHIAL PLEXUS block, Interscalene approach    Laterality:  Right  Procedures: ultrasound guided  Image captured, interpreted and electronically stored.  Local Infiltration:  Lidocaine  Strength:  1 %  Dose:  3 ml  Block Type:  Single-shot  Needle Length:  100mm  Needle Gauge:  21 G  Needle Localization:  Ultrasound guidance  Injection Assessment:  Negative aspiration for heme, no paresthesia on injection, incremental injection and local visualized surrounding nerve on ultrasound  Evidence of intravascular injection: No     Ultrasound images saved in chart

## 2022-02-02 ENCOUNTER — HOSPITAL ENCOUNTER (OUTPATIENT)
Facility: MEDICAL CENTER | Age: 77
End: 2022-02-02
Attending: PHYSICIAN ASSISTANT
Payer: MEDICARE

## 2022-02-02 PROCEDURE — 84153 ASSAY OF PSA TOTAL: CPT

## 2022-02-02 PROCEDURE — 84154 ASSAY OF PSA FREE: CPT

## 2022-02-05 LAB
PSA FREE MFR SERPL: 31 %
PSA FREE SERPL-MCNC: 2.3 NG/ML
PSA SERPL-MCNC: 7.4 NG/ML (ref 0–4)

## 2022-02-14 DIAGNOSIS — N13.8 BENIGN PROSTATIC HYPERPLASIA WITH URINARY OBSTRUCTION: Chronic | ICD-10-CM

## 2022-02-14 DIAGNOSIS — N40.1 BENIGN PROSTATIC HYPERPLASIA WITH URINARY OBSTRUCTION: Chronic | ICD-10-CM

## 2022-02-14 RX ORDER — TADALAFIL 5 MG/1
5 TABLET ORAL DAILY
Qty: 90 TABLET | Refills: 1 | Status: SHIPPED | OUTPATIENT
Start: 2022-02-14 | End: 2022-08-22 | Stop reason: SDUPTHER

## 2022-02-23 DIAGNOSIS — I10 ESSENTIAL HYPERTENSION: ICD-10-CM

## 2022-02-23 RX ORDER — AMLODIPINE BESYLATE 5 MG/1
5 TABLET ORAL DAILY
Qty: 90 TABLET | Refills: 3 | Status: SHIPPED | OUTPATIENT
Start: 2022-02-23 | End: 2022-03-25 | Stop reason: DRUGHIGH

## 2022-03-22 ENCOUNTER — NON-PROVIDER VISIT (OUTPATIENT)
Dept: INTERNAL MEDICINE | Facility: IMAGING CENTER | Age: 77
End: 2022-03-22
Payer: MEDICARE

## 2022-03-22 ENCOUNTER — HOSPITAL ENCOUNTER (OUTPATIENT)
Facility: MEDICAL CENTER | Age: 77
End: 2022-03-22
Attending: INTERNAL MEDICINE
Payer: MEDICARE

## 2022-03-22 DIAGNOSIS — E78.5 DYSLIPIDEMIA: ICD-10-CM

## 2022-03-22 LAB
ALBUMIN SERPL BCP-MCNC: 4.7 G/DL (ref 3.2–4.9)
ALBUMIN/GLOB SERPL: 2 G/DL
ALP SERPL-CCNC: 84 U/L (ref 30–99)
ALT SERPL-CCNC: 40 U/L (ref 2–50)
ANION GAP SERPL CALC-SCNC: 12 MMOL/L (ref 7–16)
AST SERPL-CCNC: 26 U/L (ref 12–45)
BILIRUB SERPL-MCNC: 0.5 MG/DL (ref 0.1–1.5)
BUN SERPL-MCNC: 23 MG/DL (ref 8–22)
CALCIUM SERPL-MCNC: 10.6 MG/DL (ref 8.5–10.5)
CHLORIDE SERPL-SCNC: 102 MMOL/L (ref 96–112)
CHOLEST SERPL-MCNC: 138 MG/DL (ref 100–199)
CO2 SERPL-SCNC: 28 MMOL/L (ref 20–33)
CREAT SERPL-MCNC: 0.98 MG/DL (ref 0.5–1.4)
GFR SERPLBLD CREATININE-BSD FMLA CKD-EPI: 81 ML/MIN/1.73 M 2
GLOBULIN SER CALC-MCNC: 2.4 G/DL (ref 1.9–3.5)
GLUCOSE SERPL-MCNC: 145 MG/DL (ref 65–99)
HDLC SERPL-MCNC: 53 MG/DL
LDLC SERPL CALC-MCNC: 43 MG/DL
POTASSIUM SERPL-SCNC: 4.4 MMOL/L (ref 3.6–5.5)
PROT SERPL-MCNC: 7.1 G/DL (ref 6–8.2)
SODIUM SERPL-SCNC: 142 MMOL/L (ref 135–145)
TRIGL SERPL-MCNC: 208 MG/DL (ref 0–149)

## 2022-03-22 PROCEDURE — 80061 LIPID PANEL: CPT

## 2022-03-22 PROCEDURE — 80053 COMPREHEN METABOLIC PANEL: CPT

## 2022-03-25 ENCOUNTER — NON-PROVIDER VISIT (OUTPATIENT)
Dept: VASCULAR LAB | Facility: MEDICAL CENTER | Age: 77
End: 2022-03-25
Attending: INTERNAL MEDICINE
Payer: MEDICARE

## 2022-03-25 VITALS — SYSTOLIC BLOOD PRESSURE: 156 MMHG | DIASTOLIC BLOOD PRESSURE: 88 MMHG | HEART RATE: 79 BPM

## 2022-03-25 DIAGNOSIS — E78.1 HYPERTRIGLYCERIDEMIA: ICD-10-CM

## 2022-03-25 DIAGNOSIS — I10 ESSENTIAL HYPERTENSION: ICD-10-CM

## 2022-03-25 PROCEDURE — 99212 OFFICE O/P EST SF 10 MIN: CPT

## 2022-03-25 RX ORDER — VIBEGRON 75 MG/1
1 TABLET, FILM COATED ORAL
COMMUNITY
Start: 2022-03-21

## 2022-03-25 RX ORDER — AMLODIPINE BESYLATE 10 MG/1
10 TABLET ORAL DAILY
Qty: 90 TABLET | Refills: 1 | Status: SHIPPED | OUTPATIENT
Start: 2022-03-25 | End: 2022-09-30

## 2022-03-25 NOTE — NON-PROVIDER
[facility] - FollowUp Visit  Date of Service: 03/25/22    Myke Steele is here for follow up of dyslipidemia.and HTN    Subjective    HPI  Pertinent Interval History since last visit:   none  Current Prescription Lipid Lowering Medications - including dose:   Statin: None  Non-Statin: Praluent 75mg q14 days  Current Lipid Lowering and Related Supplements:   CoQ10 200mg po daily  Any Current Side Effects Potentially Related to Lipid Lowering therapy?   Yes, Details: pt repeats runny nose a few days after injection  Current Adherence to Lipid Lowering Therapies:  Complete  Any Previous History of Statin Intolerance?   Yes, Details:  Patient trialed 3 different statins previously and had associated muscle pain and weakness with each one. Patient has increased CPK levels at baseline due to an unknown cause.  Baseline Lipids Prior to Treatment:  Shown here:  LDL-C: 122       nonHDL-C: 175           HDL-C: 47       Trigs: 266        Date: 9/9/2020  SOCIAL HISTORY  Social History     Tobacco Use   Smoking Status Never Smoker   Smokeless Tobacco Never Used      Change in weight: Stable  Exercise habits: no regular exercise program   Diet: low protein      Objective    There were no vitals filed for this visit.   Physical Exam    DATA REVIEW  Most Recent Lipid Panel:   Lab Results   Component Value Date    CHOLSTRLTOT 138 03/22/2022    TRIGLYCERIDE 208 (H) 03/22/2022    HDL 53 03/22/2022    LDL 43 03/22/2022       Other Pertinent Blood Work:   Lab Results   Component Value Date    SODIUM 142 03/22/2022    POTASSIUM 4.4 03/22/2022    CHLORIDE 102 03/22/2022    CO2 28 03/22/2022    ANION 12.0 03/22/2022    GLUCOSE 145 (H) 03/22/2022    BUN 23 (H) 03/22/2022    CREATININE 0.98 03/22/2022    CALCIUM 10.6 (H) 03/22/2022    ASTSGOT 26 03/22/2022    ALTSGPT 40 03/22/2022    ALKPHOSPHAT 84 03/22/2022    TBILIRUBIN 0.5 03/22/2022    ALBUMIN 4.7 03/22/2022    AGRATIO 2.0 03/22/2022    TSHULTRASEN 1.650 07/20/2021     CPKTOTAL 285 (H) 09/20/2021       Other:  NA    Recent Imaging Studies:    None since last visit          ASSESSMENT AND PLAN  Patient Type, check all that apply:   Secondary Prevention  Established Atherosclerotic Cardiovascular Disease (ASCVD)  Yes, Details: CAD stent 2012  Other Established (non-atherosclerotic) Vascular Disease, if Present:    HTN, DM  Evidence of Heterozygous Familial Hypercholesterolemia (FH): Unknown (If yes, how was diagnosis made)  ACC/AHA Indication for Statin Therapy, crystal all that apply:   Established ASCVD: Indication for High intensity statin    Calculated Risk for ASCVD, if applicable:  N/A  Other Significant Risk Markers, if any, crystal all that apply:  None  National Lipid Association (NLA) Goal (if applicable):  LDL-C:   <70 mg/dL  Lifestyle Recommendations From Today’s Visit:   Eating Plan: Concentrate on  DASH/Med Style diet  Exercise: pt would benefit from daily dedicated walking 60 minutes daily  Statin Recommendations from Today's Visit  none  Non-Statin Medications Recommendations from Today’s Visit:   Continue praluent 150mg sub q 14 days  Indication for PCSK9 Inhibitor, if applicable:  ASCVD with suboptimal control of LDL-C despite maximally tolerated statin  Supplements Recommended at this visit:  Continue CoQ10 200mg op daily  Recommendations for Other Cardiovascular Risk Factors, crystal all that apply:   Diabetes/Impaired Fasting Glucose- <100  Other Issues:  BP remains elevated at this visit.  Will increase amlodipine to 10mg po qhs    Studies Ordered at Todays Visit:  None   Blood Work Ordered At Today’s visit:   Cmp/lipid panel  Follow-Up:   6 months for lipids  Pt will f/u in 3-4 weeks for HTN    Yeny M Filter    CC:  ROXIE Olsen Cecilia G, M.D.

## 2022-03-28 DIAGNOSIS — I10 ESSENTIAL HYPERTENSION: ICD-10-CM

## 2022-03-28 RX ORDER — LISINOPRIL 40 MG/1
40 TABLET ORAL DAILY
Qty: 90 TABLET | Refills: 3 | Status: SHIPPED | OUTPATIENT
Start: 2022-03-28 | End: 2023-07-01 | Stop reason: SDUPTHER

## 2022-04-07 ENCOUNTER — TELEPHONE (OUTPATIENT)
Dept: CARDIOLOGY | Facility: MEDICAL CENTER | Age: 77
End: 2022-04-07
Payer: MEDICARE

## 2022-04-07 DIAGNOSIS — Z91.89 OTHER SPECIFIED PERSONAL RISK FACTORS, NOT ELSEWHERE CLASSIFIED: ICD-10-CM

## 2022-04-07 DIAGNOSIS — I25.83 CORONARY ATHEROSCLEROSIS DUE TO LIPID RICH PLAQUE: ICD-10-CM

## 2022-04-07 DIAGNOSIS — I25.10 CORONARY ATHEROSCLEROSIS DUE TO LIPID RICH PLAQUE: ICD-10-CM

## 2022-04-07 DIAGNOSIS — I25.83 CORONARY ATHEROSCLEROSIS DUE TO LIPID RICH PLAQUE (CODE): ICD-10-CM

## 2022-04-07 NOTE — TELEPHONE ENCOUNTER
CW    Pt called to see if CW wants him to get an echo and stress test done before his next appt. Pts appt is scheduled for 10/20/2022.    Thank you

## 2022-04-07 NOTE — TELEPHONE ENCOUNTER
"Upon chart review per MD last OV note, \"Stress test and echo next year prior to appt\"    Clarification relayed to MD for advise.    "

## 2022-04-08 ENCOUNTER — PATIENT MESSAGE (OUTPATIENT)
Dept: CARDIOLOGY | Facility: MEDICAL CENTER | Age: 77
End: 2022-04-08
Payer: MEDICARE

## 2022-04-08 NOTE — TELEPHONE ENCOUNTER
Attempted to call pt, unable to reach.  Left voicemail to return this call at their earliest convenience.    [employer] message sent to pt regarding MD recommendations.

## 2022-05-10 ENCOUNTER — APPOINTMENT (RX ONLY)
Dept: URBAN - METROPOLITAN AREA CLINIC 6 | Facility: CLINIC | Age: 77
Setting detail: DERMATOLOGY
End: 2022-05-10

## 2022-05-10 DIAGNOSIS — B35.3 TINEA PEDIS: ICD-10-CM

## 2022-05-10 DIAGNOSIS — Z85.828 PERSONAL HISTORY OF OTHER MALIGNANT NEOPLASM OF SKIN: ICD-10-CM

## 2022-05-10 DIAGNOSIS — D18.0 HEMANGIOMA: ICD-10-CM

## 2022-05-10 DIAGNOSIS — Z71.89 OTHER SPECIFIED COUNSELING: ICD-10-CM

## 2022-05-10 DIAGNOSIS — D22 MELANOCYTIC NEVI: ICD-10-CM

## 2022-05-10 DIAGNOSIS — L82.1 OTHER SEBORRHEIC KERATOSIS: ICD-10-CM

## 2022-05-10 DIAGNOSIS — L82.0 INFLAMED SEBORRHEIC KERATOSIS: ICD-10-CM

## 2022-05-10 DIAGNOSIS — L81.4 OTHER MELANIN HYPERPIGMENTATION: ICD-10-CM

## 2022-05-10 PROBLEM — D22.5 MELANOCYTIC NEVI OF TRUNK: Status: ACTIVE | Noted: 2022-05-10

## 2022-05-10 PROBLEM — D18.01 HEMANGIOMA OF SKIN AND SUBCUTANEOUS TISSUE: Status: ACTIVE | Noted: 2022-05-10

## 2022-05-10 PROCEDURE — ? LIQUID NITROGEN

## 2022-05-10 PROCEDURE — ? SUNSCREEN TREATMENT REGIMEN

## 2022-05-10 PROCEDURE — 17110 DESTRUCTION B9 LES UP TO 14: CPT

## 2022-05-10 PROCEDURE — ? PRESCRIPTION

## 2022-05-10 PROCEDURE — ? COUNSELING

## 2022-05-10 PROCEDURE — ? SUNSCREEN RECOMMENDATIONS

## 2022-05-10 PROCEDURE — 99213 OFFICE O/P EST LOW 20 MIN: CPT | Mod: 25

## 2022-05-10 RX ORDER — CICLOPIROX OLAMINE 7.7 MG/G
1 CREAM TOPICAL BID
Qty: 90 | Refills: 3 | Status: ERX | COMMUNITY
Start: 2022-05-10

## 2022-05-10 RX ADMIN — CICLOPIROX OLAMINE 1: 7.7 CREAM TOPICAL at 00:00

## 2022-05-10 ASSESSMENT — LOCATION ZONE DERM
LOCATION ZONE: TRUNK
LOCATION ZONE: ARM
LOCATION ZONE: LEG
LOCATION ZONE: HAND
LOCATION ZONE: FACE

## 2022-05-10 ASSESSMENT — LOCATION DETAILED DESCRIPTION DERM
LOCATION DETAILED: PERIUMBILICAL SKIN
LOCATION DETAILED: EPIGASTRIC SKIN
LOCATION DETAILED: RIGHT SUPERIOR MEDIAL MALAR CHEEK
LOCATION DETAILED: RIGHT ANKLE
LOCATION DETAILED: RIGHT ULNAR DORSAL HAND
LOCATION DETAILED: LEFT PROXIMAL DORSAL FOREARM
LOCATION DETAILED: LEFT DISTAL POSTERIOR UPPER ARM
LOCATION DETAILED: LEFT MEDIAL INFERIOR CHEST
LOCATION DETAILED: LEFT RADIAL DORSAL HAND

## 2022-05-10 ASSESSMENT — LOCATION SIMPLE DESCRIPTION DERM
LOCATION SIMPLE: RIGHT HAND
LOCATION SIMPLE: LEFT HAND
LOCATION SIMPLE: RIGHT CHEEK
LOCATION SIMPLE: RIGHT ANKLE
LOCATION SIMPLE: CHEST
LOCATION SIMPLE: ABDOMEN
LOCATION SIMPLE: LEFT UPPER ARM
LOCATION SIMPLE: LEFT FOREARM

## 2022-06-21 ENCOUNTER — PATIENT MESSAGE (OUTPATIENT)
Dept: CARDIOLOGY | Facility: MEDICAL CENTER | Age: 77
End: 2022-06-21
Payer: MEDICARE

## 2022-06-24 ENCOUNTER — HOSPITAL ENCOUNTER (OUTPATIENT)
Dept: RADIOLOGY | Facility: MEDICAL CENTER | Age: 77
End: 2022-06-24
Attending: FAMILY MEDICINE
Payer: MEDICARE

## 2022-06-24 ENCOUNTER — OFFICE VISIT (OUTPATIENT)
Dept: INTERNAL MEDICINE | Facility: IMAGING CENTER | Age: 77
End: 2022-06-24
Payer: MEDICARE

## 2022-06-24 VITALS
TEMPERATURE: 98.2 F | HEART RATE: 82 BPM | RESPIRATION RATE: 14 BRPM | DIASTOLIC BLOOD PRESSURE: 65 MMHG | SYSTOLIC BLOOD PRESSURE: 142 MMHG | OXYGEN SATURATION: 96 %

## 2022-06-24 DIAGNOSIS — M53.3 SACROILIAC DYSFUNCTION: ICD-10-CM

## 2022-06-24 DIAGNOSIS — E11.9 TYPE 2 DIABETES MELLITUS WITHOUT COMPLICATION, WITHOUT LONG-TERM CURRENT USE OF INSULIN (HCC): ICD-10-CM

## 2022-06-24 DIAGNOSIS — E03.9 HYPOTHYROIDISM, UNSPECIFIED TYPE: ICD-10-CM

## 2022-06-24 DIAGNOSIS — E78.5 DYSLIPIDEMIA: ICD-10-CM

## 2022-06-24 PROCEDURE — 72202 X-RAY EXAM SI JOINTS 3/> VWS: CPT

## 2022-06-24 PROCEDURE — 99214 OFFICE O/P EST MOD 30 MIN: CPT | Performed by: FAMILY MEDICINE

## 2022-06-24 RX ORDER — AMLODIPINE BESYLATE 5 MG/1
TABLET ORAL
COMMUNITY
End: 2022-06-24

## 2022-06-24 ASSESSMENT — PATIENT HEALTH QUESTIONNAIRE - PHQ9: CLINICAL INTERPRETATION OF PHQ2 SCORE: 0

## 2022-06-29 NOTE — PROGRESS NOTES
Chief Complaint   Patient presents with   • Low Back Pain     Left x 6 months, worsening, constant       Subjective:     HPI:   Myke Steele is a 76 y.o. male here to discuss the evaluation and management of:     Back Pain  Patient presents for presents evaluation of low back problems.  Symptoms have been present for 6 months and include pain in left lower back (aching in character; moderate/  severity). Initial inciting event: none. Symptoms are worst: When walking. Alleviating factors identifiable by patient are recumbency. Exacerbating factors identifiable by patient are walking. Treatments so far initiated by patient: Stretching Previous lower back problems: History of laminectomy 5 years ago. Previous workup: none. Previous treatments: none.  Stretching makes it worse  Denies radiation to his legs  Overall worse over time, can no longer stretch out of it  Sleeping, bending and getting up are all okay to do    No problems updated.     Objective:     BP (!) 142/65   Pulse 82   Temp 36.8 °C (98.2 °F)   Resp 14   SpO2 96%  There is no height or weight on file to calculate BMI.    Physical Exam:  Physical Exam  Constitutional: Well-developed and well-nourished. Not diaphoretic. No distress.   Skin: Skin is warm and dry. No rash noted.  Head: Atraumatic without lesions.  Eyes: Conjunctivae and extraocular motions are normal.   Ears:  External ears unremarkable.          Neck: Supple, No thyromegaly present. No JVD  Back+ tender to palpation in the left.  Sacral area  Negative leg  raise  Full range of motion of hips.   Chest: Effort normal.  .   Abdomen:  without distention.  .  Extremities: No cyanosis, clubbing, erythema, nor edema.   Neurological: Alert and oriented x 3.    Psychiatric:  Behavior, mood, and affect are appropriate       Lab Results   Component Value Date/Time    CHOLSTRLTOT 138 03/22/2022 0900    TRIGLYCERIDE 208 (H) 03/22/2022 0900    HDL 53 03/22/2022 0900    LDL 43 03/22/2022 0900      Assessment and Plan:     The following treatment plan was discussed:     No problem-specific Assessment & Plan notes found for this encounter.    1. Sacroiliac dysfunction  - DX-SACROILIAC JOINTS 3+; Future  - Referral to Orthopedics    2. Dyslipidemia-labs are up-to-date    3. Hypothyroidism, unspecified type  - TSH; Future    4. Type 2 diabetes mellitus without complication, without long-term current use of insulin (HCC)  - MICROALBUMIN CREAT RATIO URINE; Future  - HEMOGLOBIN A1C; Future  - Comp Metabolic Panel; Future    Other orders  - ciclopirox (LOPROX) 0.77 % cream; Apply to feet twice a day for 2-3 weeks  -         Any change or worsening of signs or symptoms, patient encouraged to follow-up or report to emergency room for further evaluation. Patient verbalizes understanding and agrees.    Follow-Up: No follow-ups on file.      PLEASE NOTE: This dictation was created using voice recognition software. I have made every reasonable attempt to correct obvious errors, but I expect that there are errors of grammar and possibly content that I did not discover before finalizing the note.    My total time spent caring for the patient on the day of the encounter was  greater than 30 minutes.   This includes obtaining history, reviewing chart, physical exam, patient education, reviewing outside records, placing orders, interpreting tests and coordinating care.

## 2022-06-30 ENCOUNTER — APPOINTMENT (RX ONLY)
Dept: URBAN - METROPOLITAN AREA CLINIC 6 | Facility: CLINIC | Age: 77
Setting detail: DERMATOLOGY
End: 2022-06-30

## 2022-06-30 DIAGNOSIS — D17 BENIGN LIPOMATOUS NEOPLASM: ICD-10-CM

## 2022-06-30 PROBLEM — D48.5 NEOPLASM OF UNCERTAIN BEHAVIOR OF SKIN: Status: ACTIVE | Noted: 2022-06-30

## 2022-06-30 PROCEDURE — ? ADDITIONAL NOTES

## 2022-06-30 PROCEDURE — 99212 OFFICE O/P EST SF 10 MIN: CPT

## 2022-06-30 PROCEDURE — ? DIAGNOSIS COMMENT

## 2022-06-30 PROCEDURE — ? ORDER ULTRASOUND

## 2022-06-30 PROCEDURE — ? COUNSELING

## 2022-06-30 PROCEDURE — ? REFERRAL

## 2022-06-30 ASSESSMENT — LOCATION ZONE DERM: LOCATION ZONE: TRUNK

## 2022-06-30 ASSESSMENT — LOCATION SIMPLE DESCRIPTION DERM: LOCATION SIMPLE: LEFT LOWER BACK

## 2022-06-30 ASSESSMENT — LOCATION DETAILED DESCRIPTION DERM: LOCATION DETAILED: LEFT INFERIOR MEDIAL LOWER BACK

## 2022-06-30 NOTE — PROCEDURE: DIAGNOSIS COMMENT
Comment: Causing pain/discomfort in left buttock/lower extremity
Render Risk Assessment In Note?: no
Detail Level: Simple

## 2022-06-30 NOTE — PROCEDURE: ORDER ULTRASOUND
Lesion Location: Left lower back
Provider: Sheri Smalls MD
Detail Level: Simple
Ultrasound Protocol: Ultrasound of Subcutaneous Mass
Subcutaneous Lesion Ultrasound Reason: Painful enlarging mass of the left lower back (lumbar/sacral area)
Priority: normal

## 2022-07-01 ENCOUNTER — HOSPITAL ENCOUNTER (OUTPATIENT)
Dept: RADIOLOGY | Facility: MEDICAL CENTER | Age: 77
End: 2022-07-01
Attending: INTERNAL MEDICINE
Payer: MEDICARE

## 2022-07-01 DIAGNOSIS — Z91.89 OTHER SPECIFIED PERSONAL RISK FACTORS, NOT ELSEWHERE CLASSIFIED: ICD-10-CM

## 2022-07-01 DIAGNOSIS — I25.83 CORONARY ATHEROSCLEROSIS DUE TO LIPID RICH PLAQUE (CODE): ICD-10-CM

## 2022-07-01 PROCEDURE — A9502 TC99M TETROFOSMIN: HCPCS

## 2022-07-01 PROCEDURE — 700111 HCHG RX REV CODE 636 W/ 250 OVERRIDE (IP)

## 2022-07-01 RX ORDER — REGADENOSON 0.08 MG/ML
INJECTION, SOLUTION INTRAVENOUS
Status: COMPLETED
Start: 2022-07-01 | End: 2022-07-01

## 2022-07-01 RX ADMIN — REGADENOSON 0.4 MG: 0.08 INJECTION, SOLUTION INTRAVENOUS at 09:13

## 2022-07-02 PROCEDURE — 78452 HT MUSCLE IMAGE SPECT MULT: CPT | Mod: 26 | Performed by: INTERNAL MEDICINE

## 2022-07-02 PROCEDURE — 93018 CV STRESS TEST I&R ONLY: CPT | Performed by: INTERNAL MEDICINE

## 2022-07-04 ENCOUNTER — HOSPITAL ENCOUNTER (OUTPATIENT)
Dept: RADIOLOGY | Facility: MEDICAL CENTER | Age: 77
End: 2022-07-04
Attending: DERMATOLOGY
Payer: MEDICARE

## 2022-07-04 DIAGNOSIS — D48.5 NEOPLASM OF UNCERTAIN BEHAVIOR OF SKIN: ICD-10-CM

## 2022-07-04 PROCEDURE — 76705 ECHO EXAM OF ABDOMEN: CPT

## 2022-07-06 ENCOUNTER — OFFICE VISIT (OUTPATIENT)
Dept: INTERNAL MEDICINE | Facility: IMAGING CENTER | Age: 77
End: 2022-07-06
Payer: MEDICARE

## 2022-07-06 ENCOUNTER — PATIENT MESSAGE (OUTPATIENT)
Dept: CARDIOLOGY | Facility: MEDICAL CENTER | Age: 77
End: 2022-07-06

## 2022-07-06 VITALS
SYSTOLIC BLOOD PRESSURE: 142 MMHG | HEART RATE: 82 BPM | OXYGEN SATURATION: 97 % | RESPIRATION RATE: 14 BRPM | TEMPERATURE: 98.3 F | DIASTOLIC BLOOD PRESSURE: 70 MMHG

## 2022-07-06 DIAGNOSIS — I25.83 CORONARY ATHEROSCLEROSIS DUE TO LIPID RICH PLAQUE: ICD-10-CM

## 2022-07-06 DIAGNOSIS — I25.10 CORONARY ATHEROSCLEROSIS DUE TO LIPID RICH PLAQUE: ICD-10-CM

## 2022-07-06 DIAGNOSIS — M79.89 PARASPINAL SOFT TISSUE MASS: ICD-10-CM

## 2022-07-06 DIAGNOSIS — M51.36 DDD (DEGENERATIVE DISC DISEASE), LUMBAR: ICD-10-CM

## 2022-07-06 PROCEDURE — 99215 OFFICE O/P EST HI 40 MIN: CPT | Performed by: FAMILY MEDICINE

## 2022-07-06 RX ORDER — TIZANIDINE 4 MG/1
4 TABLET ORAL EVERY 6 HOURS PRN
Qty: 30 TABLET | Refills: 3 | Status: SHIPPED | OUTPATIENT
Start: 2022-07-06 | End: 2022-10-20

## 2022-07-06 RX ORDER — TRAMADOL HYDROCHLORIDE 50 MG/1
50 TABLET ORAL EVERY 8 HOURS PRN
Qty: 30 TABLET | Refills: 0 | Status: SHIPPED | OUTPATIENT
Start: 2022-07-06 | End: 2022-08-05

## 2022-07-06 NOTE — PROGRESS NOTES
Chief Complaint   Patient presents with   • Back Problem     Lump         Subjective:     HPI:   Myke Steele is a 76 y.o. male here  to discuss the evaluation and management of:    1.  Lump on his back-he has been going to physical therapy for his lower back pain  At his last visit on June 24 I suspected it might be sacroiliac in origin however the results of the x-ray were normal  Mention of DDD on left side greater than right side by radiologist  He went to physical therapy and his therapist found a lump in his left lower back    He reports that exactly where the pain is and it does not bother him to sit  No radiation to his legs  Sitting, bending, getting up are fine    He is leaving on vacation tomorrow and wants to review the ultrasound that was ordered by the dermatologist.  The dermatologist also palpated the lump and declined to remove it citing it was deep for her comfort level.  Ultrasound of the area was negative however the patient questions whether the technician actually found the area in question    He has had previous surgery in his lumbar region and the mass is adjacent to the scar     No problems updated.          Objective:     BP (!) 142/70   Pulse 82   Temp 36.8 °C (98.3 °F)   Resp 14   SpO2 97%  There is no height or weight on file to calculate BMI.    Physical Exam:  Physical Exam  Constitutional: Well-developed and well-nourished. Not diaphoretic. No distress.   Skin: Skin is warm and dry. No rash noted.  Head: Atraumatic without lesions.  Eyes: Conjunctivae and extraocular motions are normal.   Ears:  External ears unremarkable.    Nose: Nares patent. Mucosa without edema or erythema. No discharge. No facial tenderness.     Neck: Supple, No thyromegaly present. No JVD  Cardiovascular: Regular rate and rhythm.   Chest: Effort normal.    Back-palpable 1 x 3 cm cystic mass adjacent to the left of inferior scar from previous surgery.  Extremities: No cyanosis, clubbing, erythema,  nor edema.   Neurological: Alert and oriented x 3.    Psychiatric:  Behavior, mood, and affect are appropriate     Assessment and Plan:     The following treatment plan was discussed/researched:  No problem-specific Assessment & Plan notes found for this encounter.  1. DDD (degenerative disc disease), lumbar-patient has follow-up appointment with spine specialist in August  - MR-LUMBAR SPINE-W/O; Future  - traMADol (ULTRAM) 50 MG Tab; Take 1 Tablet by mouth every 8 hours as needed for Moderate Pain or Severe Pain for up to 30 days.  Dispense: 30 Tablet; Refill: 0    2. Paraspinal soft tissue mass-MRI as above    3. Coronary arteriosclerosis due to lipid rich plaque-reviewed most recent cardiac stress test with patient from July 2    Other orders  - tizanidine (ZANAFLEX) 4 MG Tab; Take 1 Tablet by mouth every 6 hours as needed (pain).  Dispense: 30 Tablet; Refill: 3                                                                                                                                                                                   Any change or worsening of signs or symptoms, patient encouraged to follow-up or report to emergency room for further evaluation. Patient verbalizes understanding and agrees.    Follow-Up: In October for annual wellness visit    PLEASE NOTE: This dictation was created using voice recognition software. I have made every reasonable attempt to correct obvious errors, but I expect that there are errors of grammar and possibly content that I did not discover before finalizing the note.      My total time spent caring for the patient on the day of the encounter was  greater than 40 minutes.   This includes obtaining history, reviewing chart, physical exam, patient education, reviewing outside records, placing orders, interpreting tests and coordinating care.

## 2022-07-06 NOTE — TELEPHONE ENCOUNTER
----- Message from SAIMA Morris sent at 7/5/2022 11:10 AM PDT -----  MPI shows previous scar, but no ischemia, and normal LVEF.  Is he having any symptoms (chest pain, pressure, shortness of breath)?  If not, OK to wait until FU with CW as scheduled.  Thanks, AB

## 2022-07-20 DIAGNOSIS — E78.5 DYSLIPIDEMIA: ICD-10-CM

## 2022-08-10 DIAGNOSIS — I10 ESSENTIAL HYPERTENSION: ICD-10-CM

## 2022-08-10 RX ORDER — CARVEDILOL 12.5 MG/1
TABLET ORAL
Qty: 180 TABLET | Refills: 3 | Status: SHIPPED | OUTPATIENT
Start: 2022-08-10 | End: 2023-05-16

## 2022-08-10 RX ORDER — OMEPRAZOLE 20 MG/1
20 CAPSULE, DELAYED RELEASE ORAL DAILY
Qty: 90 CAPSULE | Refills: 3 | Status: SHIPPED | OUTPATIENT
Start: 2022-08-10 | End: 2023-08-21

## 2022-08-16 RX ORDER — LEVOTHYROXINE SODIUM 0.07 MG/1
75 TABLET ORAL
Qty: 90 TABLET | Refills: 3 | Status: SHIPPED | OUTPATIENT
Start: 2022-08-16 | End: 2023-08-08 | Stop reason: SDUPTHER

## 2022-08-22 DIAGNOSIS — N40.1 BENIGN PROSTATIC HYPERPLASIA WITH URINARY OBSTRUCTION: Chronic | ICD-10-CM

## 2022-08-22 DIAGNOSIS — N13.8 BENIGN PROSTATIC HYPERPLASIA WITH URINARY OBSTRUCTION: Chronic | ICD-10-CM

## 2022-08-22 RX ORDER — TADALAFIL 5 MG/1
5 TABLET ORAL DAILY
Qty: 90 TABLET | Refills: 1 | Status: SHIPPED | OUTPATIENT
Start: 2022-08-22 | End: 2023-02-17 | Stop reason: SDUPTHER

## 2022-08-26 ENCOUNTER — HOSPITAL ENCOUNTER (OUTPATIENT)
Dept: RADIOLOGY | Facility: MEDICAL CENTER | Age: 77
End: 2022-08-26
Attending: FAMILY MEDICINE
Payer: MEDICARE

## 2022-08-26 DIAGNOSIS — M51.36 DDD (DEGENERATIVE DISC DISEASE), LUMBAR: ICD-10-CM

## 2022-08-26 PROCEDURE — 72148 MRI LUMBAR SPINE W/O DYE: CPT | Mod: ME

## 2022-08-30 DIAGNOSIS — E11.9 TYPE 2 DIABETES MELLITUS WITHOUT COMPLICATION, WITHOUT LONG-TERM CURRENT USE OF INSULIN (HCC): ICD-10-CM

## 2022-08-30 RX ORDER — METFORMIN HYDROCHLORIDE 500 MG/1
1000 TABLET, EXTENDED RELEASE ORAL DAILY
Qty: 180 TABLET | Refills: 3 | Status: SHIPPED | OUTPATIENT
Start: 2022-08-30 | End: 2022-12-08 | Stop reason: SDUPTHER

## 2022-10-11 ENCOUNTER — HOSPITAL ENCOUNTER (OUTPATIENT)
Dept: CARDIOLOGY | Facility: MEDICAL CENTER | Age: 77
End: 2022-10-11
Attending: INTERNAL MEDICINE
Payer: MEDICARE

## 2022-10-11 DIAGNOSIS — I25.10 CORONARY ATHEROSCLEROSIS DUE TO LIPID RICH PLAQUE: ICD-10-CM

## 2022-10-11 DIAGNOSIS — I25.83 CORONARY ATHEROSCLEROSIS DUE TO LIPID RICH PLAQUE: ICD-10-CM

## 2022-10-11 LAB
LV EJECT FRACT  99904: 65
LV EJECT FRACT MOD 2C 99903: 64.82
LV EJECT FRACT MOD 4C 99902: 69.8
LV EJECT FRACT MOD BP 99901: 68.85

## 2022-10-11 PROCEDURE — 93306 TTE W/DOPPLER COMPLETE: CPT | Mod: 26 | Performed by: INTERNAL MEDICINE

## 2022-10-11 PROCEDURE — 93306 TTE W/DOPPLER COMPLETE: CPT

## 2022-10-14 ENCOUNTER — HOSPITAL ENCOUNTER (OUTPATIENT)
Dept: LAB | Facility: MEDICAL CENTER | Age: 77
End: 2022-10-14
Attending: PHYSICIAN ASSISTANT
Payer: MEDICARE

## 2022-10-14 ENCOUNTER — APPOINTMENT (OUTPATIENT)
Dept: VASCULAR LAB | Facility: MEDICAL CENTER | Age: 77
End: 2022-10-14
Payer: MEDICARE

## 2022-10-14 LAB — PSA SERPL-MCNC: 7.57 NG/ML (ref 0–4)

## 2022-10-14 PROCEDURE — 36415 COLL VENOUS BLD VENIPUNCTURE: CPT

## 2022-10-14 PROCEDURE — 84153 ASSAY OF PSA TOTAL: CPT

## 2022-10-20 ENCOUNTER — OFFICE VISIT (OUTPATIENT)
Dept: CARDIOLOGY | Facility: MEDICAL CENTER | Age: 77
End: 2022-10-20
Payer: MEDICARE

## 2022-10-20 VITALS
WEIGHT: 255 LBS | SYSTOLIC BLOOD PRESSURE: 122 MMHG | BODY MASS INDEX: 35.7 KG/M2 | RESPIRATION RATE: 16 BRPM | HEART RATE: 71 BPM | HEIGHT: 71 IN | OXYGEN SATURATION: 97 % | DIASTOLIC BLOOD PRESSURE: 60 MMHG

## 2022-10-20 DIAGNOSIS — I70.0 ATHEROSCLEROSIS OF AORTA (HCC): ICD-10-CM

## 2022-10-20 DIAGNOSIS — E78.1 HYPERTRIGLYCERIDEMIA: Chronic | ICD-10-CM

## 2022-10-20 DIAGNOSIS — I24.0 ISCHEMIC HEART DISEASE DUE TO CORONARY ARTERY OBSTRUCTION (HCC): ICD-10-CM

## 2022-10-20 DIAGNOSIS — Z95.5 STENTED CORONARY ARTERY: ICD-10-CM

## 2022-10-20 DIAGNOSIS — I25.83 CORONARY ATHEROSCLEROSIS DUE TO LIPID RICH PLAQUE: Chronic | ICD-10-CM

## 2022-10-20 DIAGNOSIS — I10 ESSENTIAL HYPERTENSION: ICD-10-CM

## 2022-10-20 DIAGNOSIS — E11.59 TYPE 2 DIABETES MELLITUS WITH OTHER CIRCULATORY COMPLICATION, WITHOUT LONG-TERM CURRENT USE OF INSULIN (HCC): ICD-10-CM

## 2022-10-20 DIAGNOSIS — I25.9 ISCHEMIC HEART DISEASE DUE TO CORONARY ARTERY OBSTRUCTION (HCC): ICD-10-CM

## 2022-10-20 DIAGNOSIS — I25.10 CORONARY ATHEROSCLEROSIS DUE TO LIPID RICH PLAQUE: Chronic | ICD-10-CM

## 2022-10-20 PROBLEM — E11.9 DIABETES MELLITUS (HCC): Status: ACTIVE | Noted: 2022-10-20

## 2022-10-20 PROCEDURE — 99214 OFFICE O/P EST MOD 30 MIN: CPT | Performed by: INTERNAL MEDICINE

## 2022-10-20 RX ORDER — EMPAGLIFLOZIN 10 MG/1
10 TABLET, FILM COATED ORAL DAILY
Qty: 90 TABLET | Refills: 3 | Status: SHIPPED | OUTPATIENT
Start: 2022-10-20 | End: 2023-09-13 | Stop reason: SDUPTHER

## 2022-10-20 ASSESSMENT — FIBROSIS 4 INDEX: FIB4 SCORE: 1.65

## 2022-10-20 NOTE — PROGRESS NOTES
"Chief Complaint   Patient presents with    Other     F/V Dx: Coronary arteriosclerosis due to lipid rich plaque       Subjective     Myke Steele is a 76 y.o. male who presents today for follow-up of his history of coronary disease with prior stenting he also has abnormal stress test now with small scars preserved ejection fraction on echo with diabetes    He is frustrated by the change in his testing fortunately is as low risk as it could be    Is interested in more advanced therapies for his diabetes his A1c peaked at 6.9      Past Medical History:   Diagnosis Date    Anesthesia 10/2021    Slow to come out of anesthesia    Basal cell adenocarcinoma     left wrist    Benign localized hyperplasia of prostate with urinary obstruction and other lower urinary tract symptoms (LUTS)(600.21) 8/9/2012    Benign prostatic hypertrophy     Class 2 severe obesity with serious comorbidity and body mass index (BMI) of 38.0 to 38.9 in adult ([facility]) 8/31/2018    Coronary arteriosclerosis due to lipid rich plaque     Coronary atherosclerosis of native coronary artery 8/9/2012    Diabetes ([facility])     oral meds    Elevated CPK     Essential hypertriglyceridemia 8/9/2012    Heart burn     High cholesterol     Hypercholesterolemia 8/9/2012    Hypertension     Hypertriglyceridemia     Lumbar spinal stenosis     Obesity 8/28/2014    Obesity (BMI 30-39.9)     Pain 04/07/15    Bilateral lower back, radiating down buttocks=\"aches\" worse with activity    Stented coronary artery 8/9/2012    Unspecified cataract     R IOL    Unspecified disorder of thyroid      Past Surgical History:   Procedure Laterality Date    AL SHLDR ARTHROSCOP,PART ACROMIOPLAS Right 11/12/2021    Procedure: DECOMPRESSION, SHOULDER, ARTHROSCOPIC - SUBACROMIAL;  Surgeon: Radha Brothers M.D.;  Location: SURGERY [facility];  Service: Orthopedics    PB SHLDR ARTHROSCOP,SURG,W/ROTAT CUFF REPB Right 11/12/2021    Procedure: ARTHROSCOPY, SHOULDER, WITH ROTATOR CUFF " REPAIR - SUPERIOR CAPSULAR RECONSTRUCTION, REPAIRS AS INDICATED;  Surgeon: Radha Brothers M.D.;  Location: SURGERY [facility];  Service: Orthopedics    DEBRIDEMENT, LABRUM, HIP, ARTHROSCOPIC Right 11/12/2021    Procedure: ARTHROSCOPIC LABRAL DEBRIDEMENT;  Surgeon: Radha Brothers M.D.;  Location: SURGERY [facility];  Service: Orthopedics    SHOULDER ARTHROSCOPY Left 2016    LUMBAR LAMINECTOMY DISKECTOMY  4/29/2015    Performed by Juan Pablo Betts M.D. at SURGERY [facility]    OTHER CARDIAC SURGERY  2008    2 Cardiac stents    STAPEDECTOMY      bilateral ears; only worked on the R ear     Family History   Problem Relation Age of Onset    Heart Failure Father     Heart Disease Father         valvular    Hypertension Mother     Hyperlipidemia Mother     Hyperlipidemia Brother     Cancer Brother         skin     Social History     Socioeconomic History    Marital status:      Spouse name: Not on file    Number of children: Not on file    Years of education: Not on file    Highest education level: Not on file   Occupational History    Not on file   Tobacco Use    Smoking status: Never    Smokeless tobacco: Never   Vaping Use    Vaping Use: Never used   Substance and Sexual Activity    Alcohol use: Not Currently    Drug use: No    Sexual activity: Not on file     Comment: , 2 daughters, retired    Other Topics Concern    Not on file   Social History Narrative    Retired,  to [employer], 2 daughters     Social Determinants of Health     Financial Resource Strain: Not on file   Food Insecurity: Not on file   Transportation Needs: Not on file   Physical Activity: Not on file   Stress: Not on file   Social Connections: Not on file   Intimate Partner Violence: Not on file   Housing Stability: Not on file     Allergies   Allergen Reactions    Atorvastatin      Elevated CPK     Outpatient Encounter Medications as of 10/20/2022   Medication Sig Dispense Refill    amLODIPine (NORVASC) 10 MG Tab TAKE  1 TABLET BY MOUTH EVERY DAY 90 Tablet 3    metFORMIN ER (GLUCOPHAGE XR) 500 MG TABLET SR 24 HR Take 2 Tablets by mouth every day. 180 Tablet 3    tadalafil (CIALIS) 5 MG tablet Take 1 Tablet by mouth every day. 90 Tablet 1    levothyroxine (SYNTHROID) 75 MCG Tab TAKE 1 TABLET BY MOUTH EVERY DAY 90 Tablet 3    carvedilol (COREG) 12.5 MG Tab TAKE 1 TABLET BY MOUTH TWICE A DAY WITH MEALS 180 Tablet 3    omeprazole (PRILOSEC) 20 MG delayed-release capsule TAKE 1 CAPSULE BY MOUTH EVERY DAY 90 Capsule 3    Alirocumab 150 MG/ML Solution Auto-injector Inject 150 mg under the skin every 14 days. Indications: High Amount of Fats in the Blood 6 Each 3    ciclopirox (LOPROX) 0.77 % cream Apply to feet twice a day for 2-3 weeks      lisinopril (PRINIVIL) 40 MG tablet TAKE 1 TABLET BY MOUTH EVERY DAY 90 Tablet 3    GEMTESA 75 MG Tab Take 1 Tablet by mouth every day. FOR 30 DAYS      meloxicam (MOBIC) 7.5 MG Tab TAKE 1 TABLET BY MOUTH 2 TIMES A DAY AS NEEDED FOR INFLAMMATION FOR UP TO 90 DAYS. 180 Tablet 3    Alpha-Lipoic Acid 600 MG Tab Take 600 mg by mouth 2 times a day.      cyclobenzaprine (FLEXERIL) 10 MG Tab TAKE 1 TABLET BY MOUTH TWICE A DAY AS NEEDED 40 Tab 1    betamethasone dipropionate (DIPROLENE) 0.05 % Cream Apply 1 Application to affected area(s) 2 times a day. To area on feet as needed 60 g 2    docusate sodium (COLACE) 100 MG Cap TAKE 1 CAP BY MOUTH 2 TIMES A DAY *PAYS CASH* 180 Cap 1    triamcinolone acetonide (KENALOG) 0.1 % Cream Apply 1 Application to affected area(s) 2 times a day. (Patient taking differently: Apply 1 Application topically as needed.) 45 g 2    FEXOFENADINE HCL PO Take  by mouth as needed.      Coenzyme Q10 (CO Q-10) 100 MG Cap Take 200 mg by mouth every day.      aspirin (ASA) 81 MG Chew Tab chewable tablet Chew 81 mg every day. 100 Tab 11    [DISCONTINUED] Nirmatrelvir&Ritonavir 300/100 20 x 150 MG & 10 x 100MG Tablet Therapy Pack Take 300 mg nirmatrelvir (two 150 mg tablets) with 100 mg  "ritonavir (one 100 mg tablet) by mouth, with all three tablets taken together twice daily for 5 days. 30 Each 0    [DISCONTINUED] methylPREDNISolone (MEDROL DOSEPAK) 4 MG Tablet Therapy Pack Follow schedule on package instructions. 21 Tablet 0    tizanidine (ZANAFLEX) 4 MG Tab Take 1 Tablet by mouth every 6 hours as needed (pain). 30 Tablet 3    [DISCONTINUED] Mirabegron ER (MYRBETRIQ) 50 MG TABLET SR 24 HR Take  by mouth every day.      oxybutynin SR (DITROPAN-XL) 10 MG CR tablet Take 1 tablet by mouth every day. 90 tablet 3     No facility-administered encounter medications on file as of 10/20/2022.     ROS           Objective     /60 (BP Location: Left arm, Patient Position: Sitting, BP Cuff Size: Large adult)   Pulse 71   Resp 16   Ht 1.803 m (5' 11\")   Wt 116 kg (255 lb)   SpO2 97%   BMI 35.57 kg/m²     Physical Exam  Constitutional:       General: He is not in acute distress.     Appearance: He is not diaphoretic.   HENT:      Mouth/Throat:      Comments: Wearing a mask for COVID protocol  Eyes:      General: No scleral icterus.  Neck:      Vascular: No JVD.   Cardiovascular:      Rate and Rhythm: Normal rate.      Heart sounds: Normal heart sounds. No murmur heard.    No friction rub. No gallop.   Pulmonary:      Effort: No respiratory distress.      Breath sounds: No wheezing or rales.   Abdominal:      General: Bowel sounds are normal.      Palpations: Abdomen is soft.   Musculoskeletal:      Right lower leg: No edema.      Left lower leg: No edema.   Skin:     Findings: No rash.   Neurological:      Mental Status: He is alert. Mental status is at baseline.   Psychiatric:         Mood and Affect: Mood normal.          We reviewed in person the most recent labs  Recent Results (from the past 5040 hour(s))   EC-ECHOCARDIOGRAM COMPLETE W/O CONT    Collection Time: 10/11/22  7:58 AM   Result Value Ref Range    Eject.Frac. MOD BP 68.85     Eject.Frac. MOD 4C 69.8     Eject.Frac. MOD 2C 64.82     Left " Ventrical Ejection Fraction 65    PROSTATE SPECIFIC AG DIAGNOSTIC    Collection Time: 10/14/22  4:05 PM   Result Value Ref Range    Prostatic Specific Antigen Tot 7.57 (H) 0.00 - 4.00 ng/mL           Myocardial Perfusion   Report   NUCLEAR IMAGING INTERPRETATION   Small defect of mildly reduced uptake in the apical septal and mid    anteroseptum segments in both stress and rest images suggestive of scar.   Small defect of mildly reduced uptake in the basal inferior and inferoseptal    segments in both stress and rest images suggestive of scar.   No ischemia identified.   Normal left ventricular wall motion.  LV ejection fraction = 64%.   ECG INTERPRETATION   Negative stress ECG for ischemia.     We reviewed the images of the stress test and echo        Assessment & Plan     1. Essential hypertension        2. Coronary arteriosclerosis due to lipid rich plaque  Comp Metabolic Panel      3. Hypertriglyceridemia        4. Stented coronary artery        5. 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 Atherosclerosis of aorta (HCC)        7. Type 2 diabetes mellitus with other circulatory complication, without long-term current use of insulin (HCC)  Comp Metabolic Panel          Medical Decision Making: Today's Assessment/Status/Plan:        It was my pleasure to meet with Mr. Steele.    We addressed the management of hypertension at today's visit. Blood pressure is well controlled.  We specifically assessed the labs on hypertension treatment    We addressed the management of dyslipidemia at today's visit. He is on appropriate statin.    We addressed the management of coronary artery disease.  He is on proper antiplatelet, cholesterol management and beta-blockers as appropriate.  We addressed the potential side effects and laboratory follow-up for these medications.      ADDED JARDIANCE and encouraged him to discuss with primary care about the injectables    We discussed that you should  talk with primary care (or endocrinology) about oral medication: empagliflozin (JARDIANCE®  Preferred for CAD), dapagliflozin (FARXIGA®, preferred for CHF),  there are cardiovascular benefits but there are also risks.  You may also want to consider liraglutide (Victoza®), semaglutide (Ozempic®), dulaglutide (Trulicity®) which are injection with cardiovascular benefits but also risks.      I will see Mr. Steele back in 1 year time and encouraged him to follow up with us over the phone or electronically using my MyChart as issues arise.    It is my pleasure to participate in the care of Mr. Steele.  Please do not hesitate to contact me with questions or concerns.    Raj Olvera MD PhD [facility]  Cardiologist [facility]    Please note that this dictation was created using voice recognition software. There may be errors I did not discover before finalizing the note.

## 2022-10-20 NOTE — PATIENT INSTRUCTIONS
We discussed that you should talk with primary care (or endocrinology) about oral medication: empagliflozin (JARDIANCE®  Preferred for CAD), dapagliflozin (FARXIGA®, preferred for CHF),  there are cardiovascular benefits but there are also risks.  You may also want to consider liraglutide (Victoza®), semaglutide (Ozempic®), dulaglutide (Trulicity®) which are injection with cardiovascular benefits but also risks.    We discussed adding Jardiance (empagliflozin) 10 mg daily to your regimen to prevent complications from heart disease and help with blood sugar.  Based on large clinical trials Jardiance is expected to reduce your risk by 38% of heart attack or dying from heart disease if you have coronary disease and diabetes (EMPA-REG Study).  In patients without diabetes but who have heart failure, there is a 21-25% relatively less likelihood of dying or being hospitalized for heart failure (EMPEROR Study).  While Jardiance has benefits (most importantly reduce risk for hospitalization for heart failure or heart attack, weight loss, blood pressure control and reduces death) it also has risks due to the fact that you will be urinating excess sugar out of your body. There is increased risk for urinary and yeast infection, which can be very serious, so please report any symptoms as soon as possible to urgent care, primary care or our office.  You should stay well-hydrated on this medication and report abdominal pains.  You are expected to lose weight over time with this medication and may need to reduce your other medications.  It is advised to check your chemistries after starting this medication within the month and then regularly as is typically required for your other medications.

## 2022-10-24 ENCOUNTER — NON-PROVIDER VISIT (OUTPATIENT)
Dept: INTERNAL MEDICINE | Facility: IMAGING CENTER | Age: 77
End: 2022-10-24
Payer: MEDICARE

## 2022-10-24 DIAGNOSIS — Z23 NEED FOR VACCINATION: ICD-10-CM

## 2022-10-24 PROCEDURE — 90662 IIV NO PRSV INCREASED AG IM: CPT | Performed by: FAMILY MEDICINE

## 2022-10-24 PROCEDURE — G0008 ADMIN INFLUENZA VIRUS VAC: HCPCS | Performed by: FAMILY MEDICINE

## 2022-11-10 ENCOUNTER — APPOINTMENT (RX ONLY)
Dept: URBAN - METROPOLITAN AREA CLINIC 6 | Facility: CLINIC | Age: 77
Setting detail: DERMATOLOGY
End: 2022-11-10

## 2022-11-10 ENCOUNTER — PATIENT MESSAGE (OUTPATIENT)
Dept: HEALTH INFORMATION MANAGEMENT | Facility: OTHER | Age: 77
End: 2022-11-10

## 2022-11-10 DIAGNOSIS — Z85.828 PERSONAL HISTORY OF OTHER MALIGNANT NEOPLASM OF SKIN: ICD-10-CM | Status: STABLE

## 2022-11-10 DIAGNOSIS — B35.3 TINEA PEDIS: ICD-10-CM

## 2022-11-10 DIAGNOSIS — L82.1 OTHER SEBORRHEIC KERATOSIS: ICD-10-CM

## 2022-11-10 DIAGNOSIS — L72.8 OTHER FOLLICULAR CYSTS OF THE SKIN AND SUBCUTANEOUS TISSUE: ICD-10-CM

## 2022-11-10 DIAGNOSIS — L57.0 ACTINIC KERATOSIS: ICD-10-CM

## 2022-11-10 DIAGNOSIS — Z71.89 OTHER SPECIFIED COUNSELING: ICD-10-CM

## 2022-11-10 DIAGNOSIS — D22 MELANOCYTIC NEVI: ICD-10-CM

## 2022-11-10 DIAGNOSIS — D18.0 HEMANGIOMA: ICD-10-CM

## 2022-11-10 DIAGNOSIS — L81.4 OTHER MELANIN HYPERPIGMENTATION: ICD-10-CM

## 2022-11-10 PROBLEM — D22.5 MELANOCYTIC NEVI OF TRUNK: Status: ACTIVE | Noted: 2022-11-10

## 2022-11-10 PROBLEM — D18.01 HEMANGIOMA OF SKIN AND SUBCUTANEOUS TISSUE: Status: ACTIVE | Noted: 2022-11-10

## 2022-11-10 PROCEDURE — ? COUNSELING

## 2022-11-10 PROCEDURE — ? PRESCRIPTION

## 2022-11-10 PROCEDURE — 99213 OFFICE O/P EST LOW 20 MIN: CPT | Mod: 25

## 2022-11-10 PROCEDURE — ? SUNSCREEN TREATMENT REGIMEN

## 2022-11-10 PROCEDURE — ? DEFER

## 2022-11-10 PROCEDURE — ? LIQUID NITROGEN

## 2022-11-10 PROCEDURE — 17003 DESTRUCT PREMALG LES 2-14: CPT

## 2022-11-10 PROCEDURE — ? SUNSCREEN RECOMMENDATIONS

## 2022-11-10 PROCEDURE — 17000 DESTRUCT PREMALG LESION: CPT

## 2022-11-10 PROCEDURE — ? PRESCRIPTION MEDICATION MANAGEMENT

## 2022-11-10 RX ORDER — CICLOPIROX OLAMINE 7.7 MG/G
1 CREAM TOPICAL BID
Qty: 90 | Refills: 3 | Status: ERX

## 2022-11-10 ASSESSMENT — LOCATION SIMPLE DESCRIPTION DERM
LOCATION SIMPLE: RIGHT HAND
LOCATION SIMPLE: POSTERIOR NECK
LOCATION SIMPLE: UPPER BACK
LOCATION SIMPLE: LEFT FOREARM
LOCATION SIMPLE: ABDOMEN
LOCATION SIMPLE: CHEST
LOCATION SIMPLE: LEFT HAND
LOCATION SIMPLE: RIGHT ANKLE

## 2022-11-10 ASSESSMENT — LOCATION DETAILED DESCRIPTION DERM
LOCATION DETAILED: LEFT RADIAL DORSAL HAND
LOCATION DETAILED: PERIUMBILICAL SKIN
LOCATION DETAILED: LEFT DISTAL DORSAL FOREARM
LOCATION DETAILED: RIGHT ULNAR DORSAL HAND
LOCATION DETAILED: STERNUM
LOCATION DETAILED: RIGHT ANKLE
LOCATION DETAILED: LEFT POSTERIOR NECK
LOCATION DETAILED: EPIGASTRIC SKIN
LOCATION DETAILED: SUPERIOR THORACIC SPINE
LOCATION DETAILED: LEFT PROXIMAL DORSAL FOREARM

## 2022-11-10 ASSESSMENT — LOCATION ZONE DERM
LOCATION ZONE: HAND
LOCATION ZONE: ARM
LOCATION ZONE: NECK
LOCATION ZONE: TRUNK
LOCATION ZONE: LEG

## 2022-11-10 NOTE — PROCEDURE: PRESCRIPTION MEDICATION MANAGEMENT
Render In Strict Bullet Format?: No
Detail Level: Zone
Continue Regimen: ciclopirox 0.77 % topical cream Bid for maintenance

## 2022-11-10 NOTE — PROCEDURE: LIQUID NITROGEN
Duration Of Freeze Thaw-Cycle (Seconds): 10
Number Of Freeze-Thaw Cycles: 2 freeze-thaw cycles
Post-Care Instructions: I reviewed with the patient in detail post-care instructions. Patient is to wear sunprotection, and avoid picking at any of the treated lesions. Pt may apply Vaseline to crusted or scabbing areas.
Consent: The patient's consent was obtained including but not limited to risks of crusting, scabbing, blistering, scarring, darker or lighter pigmentary change, recurrence, incomplete removal and infection.
Show Applicator Variable?: Yes
Render Post-Care Instructions In Note?: no
Detail Level: Detailed

## 2022-11-10 NOTE — PROCEDURE: DEFER
Detail Level: Detailed
Introduction Text (Please End With A Colon): s/p pain clinic, nerve block x 1, declines further intervention, \"can live with it\" for now
Size Of Lesion In Cm (Optional): 0

## 2022-11-30 ENCOUNTER — OFFICE VISIT (OUTPATIENT)
Dept: INTERNAL MEDICINE | Facility: IMAGING CENTER | Age: 77
End: 2022-11-30
Payer: MEDICARE

## 2022-11-30 ENCOUNTER — HOSPITAL ENCOUNTER (OUTPATIENT)
Facility: MEDICAL CENTER | Age: 77
End: 2022-11-30
Attending: INTERNAL MEDICINE
Payer: MEDICARE

## 2022-11-30 VITALS
OXYGEN SATURATION: 94 % | RESPIRATION RATE: 17 BRPM | WEIGHT: 255.73 LBS | BODY MASS INDEX: 35.67 KG/M2 | DIASTOLIC BLOOD PRESSURE: 72 MMHG | TEMPERATURE: 98.3 F | SYSTOLIC BLOOD PRESSURE: 128 MMHG | HEART RATE: 82 BPM

## 2022-11-30 DIAGNOSIS — S46.812A TRAPEZIUS MUSCLE STRAIN, LEFT, INITIAL ENCOUNTER: ICD-10-CM

## 2022-11-30 DIAGNOSIS — I25.83 CORONARY ATHEROSCLEROSIS DUE TO LIPID RICH PLAQUE: Chronic | ICD-10-CM

## 2022-11-30 DIAGNOSIS — E78.1 HYPERTRIGLYCERIDEMIA: ICD-10-CM

## 2022-11-30 DIAGNOSIS — E11.9 TYPE 2 DIABETES MELLITUS WITHOUT COMPLICATION, WITHOUT LONG-TERM CURRENT USE OF INSULIN (HCC): ICD-10-CM

## 2022-11-30 DIAGNOSIS — I25.10 CORONARY ATHEROSCLEROSIS DUE TO LIPID RICH PLAQUE: Chronic | ICD-10-CM

## 2022-11-30 DIAGNOSIS — E11.59 TYPE 2 DIABETES MELLITUS WITH OTHER CIRCULATORY COMPLICATION, WITHOUT LONG-TERM CURRENT USE OF INSULIN (HCC): ICD-10-CM

## 2022-11-30 DIAGNOSIS — E03.9 HYPOTHYROIDISM, UNSPECIFIED TYPE: ICD-10-CM

## 2022-11-30 DIAGNOSIS — I10 ESSENTIAL HYPERTENSION: ICD-10-CM

## 2022-11-30 LAB
ALBUMIN SERPL BCP-MCNC: 4.6 G/DL (ref 3.2–4.9)
ALBUMIN/GLOB SERPL: 1.8 G/DL
ALP SERPL-CCNC: 75 U/L (ref 30–99)
ALT SERPL-CCNC: 34 U/L (ref 2–50)
ANION GAP SERPL CALC-SCNC: 13 MMOL/L (ref 7–16)
AST SERPL-CCNC: 20 U/L (ref 12–45)
BILIRUB SERPL-MCNC: 0.4 MG/DL (ref 0.1–1.5)
BUN SERPL-MCNC: 24 MG/DL (ref 8–22)
CALCIUM SERPL-MCNC: 10.6 MG/DL (ref 8.5–10.5)
CHLORIDE SERPL-SCNC: 107 MMOL/L (ref 96–112)
CHOLEST SERPL-MCNC: 129 MG/DL (ref 100–199)
CO2 SERPL-SCNC: 23 MMOL/L (ref 20–33)
CREAT SERPL-MCNC: 0.96 MG/DL (ref 0.5–1.4)
EST. AVERAGE GLUCOSE BLD GHB EST-MCNC: 128 MG/DL
GFR SERPLBLD CREATININE-BSD FMLA CKD-EPI: 82 ML/MIN/1.73 M 2
GLOBULIN SER CALC-MCNC: 2.5 G/DL (ref 1.9–3.5)
GLUCOSE SERPL-MCNC: 142 MG/DL (ref 65–99)
HBA1C MFR BLD: 6.1 % (ref 4–5.6)
HDLC SERPL-MCNC: 60 MG/DL
LDLC SERPL CALC-MCNC: 45 MG/DL
POTASSIUM SERPL-SCNC: 4.5 MMOL/L (ref 3.6–5.5)
PROT SERPL-MCNC: 7.1 G/DL (ref 6–8.2)
SODIUM SERPL-SCNC: 143 MMOL/L (ref 135–145)
TRIGL SERPL-MCNC: 120 MG/DL (ref 0–149)
TSH SERPL DL<=0.005 MIU/L-ACNC: 2.16 UIU/ML (ref 0.38–5.33)

## 2022-11-30 PROCEDURE — 99215 OFFICE O/P EST HI 40 MIN: CPT | Performed by: FAMILY MEDICINE

## 2022-11-30 PROCEDURE — 80061 LIPID PANEL: CPT

## 2022-11-30 PROCEDURE — 84443 ASSAY THYROID STIM HORMONE: CPT

## 2022-11-30 PROCEDURE — 80053 COMPREHEN METABOLIC PANEL: CPT

## 2022-11-30 PROCEDURE — 83036 HEMOGLOBIN GLYCOSYLATED A1C: CPT

## 2022-11-30 ASSESSMENT — FIBROSIS 4 INDEX: FIB4 SCORE: 1.68

## 2022-12-02 NOTE — PROGRESS NOTES
Chief Complaint   Patient presents with    Shoulder Pain     Left shoulder has been hurting for awhile but has slowly been getting worse. It is now feeling very stiff and sore and he reports it is now affecting neck stiffness as well. Tylenol and heating pad helps minimally.       Subjective:     HPI:   Myke Steele is a 77 y.o. male w/ h/o type 2 diabetes here  to discuss the evaluation and management of:  Left shoulder pain as described above  Its been intermittent for quite a while but now it is more consistent  Tylenol and heat can help    It hurts to raise his left arm  It can be very achy     No problems updated.          Objective:     /72 (BP Location: Left arm, Patient Position: Sitting, BP Cuff Size: Adult)   Pulse 82   Temp 36.8 °C (98.3 °F) (Temporal)   Resp 17   Wt 116 kg (255 lb 11.7 oz)   SpO2 94%  Body mass index is 35.67 kg/m².    Physical Exam:  Physical Exam  Constitutional: Well-developed and well-nourished. Not diaphoretic. No distress.   Skin: Skin is warm and dry. No rash noted.  Head: Atraumatic without lesions.  Eyes: Conjunctivae and extraocular motions are normal.   Ears:  External ears unremarkable.    Nose: Nares patent. Mucosa without edema or erythema. No discharge. No facial tenderness.     Neck: Supple,  No JVD no vertebral tenderness  Most tenderness is paravertebral musculature  Significant amount of left trapezius spasm greater than right       Chest: Effort normal.    Abdomen:  without distention.  .  Extremities: No cyanosis, clubbing, erythema, nor edema.   Neurological: Alert and oriented x 3.    Psychiatric:  Behavior, mood, and affect are appropriate     Assessment and Plan:     The following treatment plan was discussed/researched:  No problem-specific Assessment & Plan notes found for this encounter.      1. Trapezius muscle strain, left, initial encounter-  Differential diagnosis discussed and options  We will start with a trial of physical  therapy  - Referral to Physical Therapy  He will let me know if he is not improving      2. Type 2 diabetes mellitus without complication, without long-term current use of insulin (HCC)-well-controlled, he was started on Jardiance by his cardiologist  We discussed the benefits at length  He denies hypoglycemia  Will recommend he continue with current regimen and repeat A1c in 3 months  Consider going off metformin    Lab Results   Component Value Date/Time    HBA1C 6.1 (H) 11/30/2022 09:40 AM         3. Essential hypertension-well-controlled, no med changes indicated                                                                                                                                                                                        Any change or worsening of signs or symptoms, patient encouraged to follow-up or report to emergency room for further evaluation. Patient verbalizes understanding and agrees.    Follow-Up: As needed      PLEASE NOTE: This dictation was created using voice recognition software. I have made every reasonable attempt to correct obvious errors, but I expect that there are errors of grammar and possibly content that I did not discover before finalizing the note.      My total time spent caring for the patient on the day of the encounter was  greater than 40 minutes.   This includes obtaining history, reviewing chart, physical exam, patient education, reviewing outside records, placing orders, interpreting tests and coordinating care.

## 2022-12-08 DIAGNOSIS — E11.9 TYPE 2 DIABETES MELLITUS WITHOUT COMPLICATION, WITHOUT LONG-TERM CURRENT USE OF INSULIN (HCC): ICD-10-CM

## 2022-12-08 RX ORDER — METFORMIN HYDROCHLORIDE 500 MG/1
1000 TABLET, EXTENDED RELEASE ORAL DAILY
Qty: 180 TABLET | Refills: 3 | Status: SHIPPED | OUTPATIENT
Start: 2022-12-08 | End: 2023-12-18

## 2022-12-09 DIAGNOSIS — G89.29 CHRONIC LEFT SHOULDER PAIN: ICD-10-CM

## 2022-12-09 DIAGNOSIS — M25.512 CHRONIC LEFT SHOULDER PAIN: ICD-10-CM

## 2022-12-18 ENCOUNTER — HOSPITAL ENCOUNTER (OUTPATIENT)
Dept: RADIOLOGY | Facility: MEDICAL CENTER | Age: 77
End: 2022-12-18
Attending: FAMILY MEDICINE
Payer: MEDICARE

## 2022-12-18 DIAGNOSIS — G89.29 CHRONIC LEFT SHOULDER PAIN: ICD-10-CM

## 2022-12-18 DIAGNOSIS — M25.512 CHRONIC LEFT SHOULDER PAIN: ICD-10-CM

## 2022-12-18 PROCEDURE — 73221 MRI JOINT UPR EXTREM W/O DYE: CPT | Mod: LT

## 2022-12-22 ENCOUNTER — PATIENT MESSAGE (OUTPATIENT)
Dept: CARDIOLOGY | Facility: MEDICAL CENTER | Age: 77
End: 2022-12-22
Payer: MEDICARE

## 2022-12-29 NOTE — PATIENT COMMUNICATION
CW    Caller: Myke Steele    Topic/issue: SURGICAL CLEARANCE    Patient states that he dropped off his clearance on 12/21 at [facility]. Patient was informed that CW was in [place] on this day and he was told that the forms would be faxed over to [facility] so that he can be cleared for surgery and get scheduled for his procedure. Please advise.    Thank you,  Oziel ROYAL    Callback Number: [phone] (home)

## 2022-12-29 NOTE — PATIENT COMMUNICATION
Phone Number Called: [phone]    Call outcome: Spoke to patient regarding message below.    Message: Called to confirm surgical clearance needed for BARBER Dr. Radha Brothers, ph [phone] for shoulder replacement.   Patient needs clearance before being able to schedule the surgery.     Spoke to 's office, surgery scheduler, who will fax clearance request. Provided direct fax to nurses station. Will await clearance form.

## 2022-12-29 NOTE — PATIENT COMMUNICATION
Received completed clearance form from [facility] ([facility] as CW team out of office). Faxed to [phone]. Confirmation received. Scanned to [employer].     My chart message sent to patient.

## 2022-12-29 NOTE — PATIENT COMMUNICATION
Received clearance form from [facility] with [facility] Shoulder [employer] for shoulder surgery with Dr. Brothers, date TBD.  Ph: [phone] F: [phone]    Scanned into [employer] [place].    Form given to ADD (CW care team out of office) for review and signature

## 2023-01-24 ENCOUNTER — PRE-ADMISSION TESTING (OUTPATIENT)
Dept: ADMISSIONS | Facility: MEDICAL CENTER | Age: 78
End: 2023-01-24
Attending: ORTHOPAEDIC SURGERY
Payer: MEDICARE

## 2023-01-24 ENCOUNTER — PATIENT MESSAGE (OUTPATIENT)
Dept: CARDIOLOGY | Facility: MEDICAL CENTER | Age: 78
End: 2023-01-24

## 2023-01-24 DIAGNOSIS — Z01.812 PRE-OPERATIVE LABORATORY EXAMINATION: ICD-10-CM

## 2023-01-24 DIAGNOSIS — Z01.810 PRE-OPERATIVE CARDIOVASCULAR EXAMINATION: ICD-10-CM

## 2023-01-24 LAB
ANION GAP SERPL CALC-SCNC: 12 MMOL/L (ref 7–16)
BUN SERPL-MCNC: 21 MG/DL (ref 8–22)
CALCIUM SERPL-MCNC: 9.8 MG/DL (ref 8.4–10.2)
CHLORIDE SERPL-SCNC: 103 MMOL/L (ref 96–112)
CO2 SERPL-SCNC: 24 MMOL/L (ref 20–33)
CREAT SERPL-MCNC: 0.91 MG/DL (ref 0.5–1.4)
EKG IMPRESSION: NORMAL
ERYTHROCYTE [DISTWIDTH] IN BLOOD BY AUTOMATED COUNT: 47 FL (ref 35.9–50)
GFR SERPLBLD CREATININE-BSD FMLA CKD-EPI: 88 ML/MIN/1.73 M 2
GLUCOSE SERPL-MCNC: 131 MG/DL (ref 65–99)
HCT VFR BLD AUTO: 48.1 % (ref 42–52)
HGB BLD-MCNC: 15.8 G/DL (ref 14–18)
MCH RBC QN AUTO: 30.9 PG (ref 27–33)
MCHC RBC AUTO-ENTMCNC: 32.8 G/DL (ref 33.7–35.3)
MCV RBC AUTO: 94.1 FL (ref 81.4–97.8)
PLATELET # BLD AUTO: 180 K/UL (ref 164–446)
PMV BLD AUTO: 11.5 FL (ref 9–12.9)
POTASSIUM SERPL-SCNC: 4.4 MMOL/L (ref 3.6–5.5)
RBC # BLD AUTO: 5.11 M/UL (ref 4.7–6.1)
SCCMEC + MECA PNL NOSE NAA+PROBE: NEGATIVE
SCCMEC + MECA PNL NOSE NAA+PROBE: POSITIVE
SODIUM SERPL-SCNC: 139 MMOL/L (ref 135–145)
WBC # BLD AUTO: 10.7 K/UL (ref 4.8–10.8)

## 2023-01-24 PROCEDURE — 93005 ELECTROCARDIOGRAM TRACING: CPT

## 2023-01-24 PROCEDURE — 36415 COLL VENOUS BLD VENIPUNCTURE: CPT

## 2023-01-24 PROCEDURE — 93010 ELECTROCARDIOGRAM REPORT: CPT | Performed by: INTERNAL MEDICINE

## 2023-01-24 PROCEDURE — 87641 MR-STAPH DNA AMP PROBE: CPT

## 2023-01-24 PROCEDURE — 87640 STAPH A DNA AMP PROBE: CPT | Mod: XU

## 2023-01-24 PROCEDURE — 80048 BASIC METABOLIC PNL TOTAL CA: CPT

## 2023-01-24 PROCEDURE — 85027 COMPLETE CBC AUTOMATED: CPT

## 2023-01-24 ASSESSMENT — FIBROSIS 4 INDEX: FIB4 SCORE: 1.4

## 2023-01-24 NOTE — PREPROCEDURE INSTRUCTIONS
Pre admit apt: Pt. Instructed to continue regularly prescribed medications through day before surgery.  Instructed to take the following medications, the day of surgery, with a sip of water per anesthesia protocol: amlodipine, carvedilol, flexeril, gemtesa, levothyroxine, omperazole,     Pt instructed to hold cialis 48 hrs prior to surgery, and to hold jardiace 3 days prior to surgery, per anesthesia protocol.    METS-4  Pt states no previous issues with anesthesia.

## 2023-01-24 NOTE — DISCHARGE PLANNING
DISCHARGE PLANNING NOTE - TOTAL JOINT    Procedure: Procedure(s):  LEFT REVERSE TOTAL SHOULDER ARTHROPLASTY  Procedure Date: 2/3/2023  Insurance: Payor: MEDICARE / Plan: MEDICARE PART A & B / Product Type: *No Product type* /    Equipment currently available at home?   ice  Steps into the home? 2 with rails on both sides.  Steps within the home? 0  Toilet height? ADA  Type of shower? walk-in shower  Who will be with you during your recovery? spouse  Is Outpatient Physical Therapy set up after surgery? Yes  Did you take the Total Joint Class and where? Yes  Planning same day discharge?Yes     This writer met with pt Home safety checklist reviewed and copy given to pt. Pt educated to dc criteria. All questions answered and verbalizes understanding of all instructions. No dc needs identified at this time. Anticipate dc to home without barriers.

## 2023-01-26 NOTE — PROGRESS NOTES
He can proceed with the proposed procedure or surgery from a cardiac standpoint, no modifiable cardiovascular risk, no further cardiac testing required, hold aspirin as necessary.    It is my pleasure to participate in the care of Mr. Steele.  Please do not hesitate to contact me with questions or concerns. [facility] Card[facility] is available 24/7 for consultative services at [phone] in the perioperative period.    Electronically Signed    Raj Olvera MD PhD [facility]  Cardiologist [facility]    Please note that this dictation was created using voice recognition software. There may be errors I did not discover before finalizing the note.

## 2023-01-27 NOTE — PATIENT COMMUNICATION
Replied to pt via [employer] regarding recommendations, see encounter.    Awaiting for fax to be received.

## 2023-02-02 ENCOUNTER — ANESTHESIA EVENT (OUTPATIENT)
Dept: SURGERY | Facility: MEDICAL CENTER | Age: 78
End: 2023-02-02
Payer: MEDICARE

## 2023-02-03 ENCOUNTER — ANESTHESIA (OUTPATIENT)
Dept: SURGERY | Facility: MEDICAL CENTER | Age: 78
End: 2023-02-03
Payer: MEDICARE

## 2023-02-03 ENCOUNTER — APPOINTMENT (OUTPATIENT)
Dept: RADIOLOGY | Facility: MEDICAL CENTER | Age: 78
End: 2023-02-03
Attending: ORTHOPAEDIC SURGERY
Payer: MEDICARE

## 2023-02-03 ENCOUNTER — HOSPITAL ENCOUNTER (OUTPATIENT)
Facility: MEDICAL CENTER | Age: 78
End: 2023-02-03
Attending: ORTHOPAEDIC SURGERY | Admitting: ORTHOPAEDIC SURGERY
Payer: MEDICARE

## 2023-02-03 VITALS
HEIGHT: 71 IN | HEART RATE: 77 BPM | SYSTOLIC BLOOD PRESSURE: 106 MMHG | BODY MASS INDEX: 35.45 KG/M2 | WEIGHT: 253.2 LBS | OXYGEN SATURATION: 91 % | DIASTOLIC BLOOD PRESSURE: 56 MMHG | TEMPERATURE: 97.7 F | RESPIRATION RATE: 18 BRPM

## 2023-02-03 LAB
GLUCOSE BLD STRIP.AUTO-MCNC: 147 MG/DL (ref 65–99)
GLUCOSE BLD STRIP.AUTO-MCNC: 149 MG/DL (ref 65–99)

## 2023-02-03 PROCEDURE — 160047 HCHG PACU  - EA ADDL 30 MINS PHASE II: Performed by: ORTHOPAEDIC SURGERY

## 2023-02-03 PROCEDURE — C9290 INJ, BUPIVACAINE LIPOSOME: HCPCS | Performed by: STUDENT IN AN ORGANIZED HEALTH CARE EDUCATION/TRAINING PROGRAM

## 2023-02-03 PROCEDURE — 73020 X-RAY EXAM OF SHOULDER: CPT | Mod: LT

## 2023-02-03 PROCEDURE — 700101 HCHG RX REV CODE 250: Performed by: STUDENT IN AN ORGANIZED HEALTH CARE EDUCATION/TRAINING PROGRAM

## 2023-02-03 PROCEDURE — 64415 NJX AA&/STRD BRCH PLXS IMG: CPT | Mod: 59,LT | Performed by: STUDENT IN AN ORGANIZED HEALTH CARE EDUCATION/TRAINING PROGRAM

## 2023-02-03 PROCEDURE — 160002 HCHG RECOVERY MINUTES (STAT): Performed by: ORTHOPAEDIC SURGERY

## 2023-02-03 PROCEDURE — C1776 JOINT DEVICE (IMPLANTABLE): HCPCS | Performed by: ORTHOPAEDIC SURGERY

## 2023-02-03 PROCEDURE — 160025 RECOVERY II MINUTES (STATS): Performed by: ORTHOPAEDIC SURGERY

## 2023-02-03 PROCEDURE — 99100 ANES PT EXTEME AGE<1 YR&>70: CPT | Performed by: STUDENT IN AN ORGANIZED HEALTH CARE EDUCATION/TRAINING PROGRAM

## 2023-02-03 PROCEDURE — 160041 HCHG SURGERY MINUTES - EA ADDL 1 MIN LEVEL 4: Performed by: ORTHOPAEDIC SURGERY

## 2023-02-03 PROCEDURE — 97535 SELF CARE MNGMENT TRAINING: CPT

## 2023-02-03 PROCEDURE — 502000 HCHG MISC OR IMPLANTS RC 0278: Performed by: ORTHOPAEDIC SURGERY

## 2023-02-03 PROCEDURE — 160048 HCHG OR STATISTICAL LEVEL 1-5: Performed by: ORTHOPAEDIC SURGERY

## 2023-02-03 PROCEDURE — 160009 HCHG ANES TIME/MIN: Performed by: ORTHOPAEDIC SURGERY

## 2023-02-03 PROCEDURE — 700102 HCHG RX REV CODE 250 W/ 637 OVERRIDE(OP): Performed by: STUDENT IN AN ORGANIZED HEALTH CARE EDUCATION/TRAINING PROGRAM

## 2023-02-03 PROCEDURE — 700111 HCHG RX REV CODE 636 W/ 250 OVERRIDE (IP): Performed by: STUDENT IN AN ORGANIZED HEALTH CARE EDUCATION/TRAINING PROGRAM

## 2023-02-03 PROCEDURE — 97165 OT EVAL LOW COMPLEX 30 MIN: CPT

## 2023-02-03 PROCEDURE — 160046 HCHG PACU - 1ST 60 MINS PHASE II: Performed by: ORTHOPAEDIC SURGERY

## 2023-02-03 PROCEDURE — A9270 NON-COVERED ITEM OR SERVICE: HCPCS | Performed by: STUDENT IN AN ORGANIZED HEALTH CARE EDUCATION/TRAINING PROGRAM

## 2023-02-03 PROCEDURE — 160029 HCHG SURGERY MINUTES - 1ST 30 MINS LEVEL 4: Performed by: ORTHOPAEDIC SURGERY

## 2023-02-03 PROCEDURE — 700105 HCHG RX REV CODE 258: Performed by: ORTHOPAEDIC SURGERY

## 2023-02-03 PROCEDURE — 01638 ANES OPN/ARTHR TOT SHO RPLCM: CPT | Performed by: STUDENT IN AN ORGANIZED HEALTH CARE EDUCATION/TRAINING PROGRAM

## 2023-02-03 PROCEDURE — 700105 HCHG RX REV CODE 258: Performed by: STUDENT IN AN ORGANIZED HEALTH CARE EDUCATION/TRAINING PROGRAM

## 2023-02-03 PROCEDURE — 64415 NJX AA&/STRD BRCH PLXS IMG: CPT | Performed by: ORTHOPAEDIC SURGERY

## 2023-02-03 PROCEDURE — 700111 HCHG RX REV CODE 636 W/ 250 OVERRIDE (IP): Performed by: ORTHOPAEDIC SURGERY

## 2023-02-03 PROCEDURE — 160035 HCHG PACU - 1ST 60 MINS PHASE I: Performed by: ORTHOPAEDIC SURGERY

## 2023-02-03 PROCEDURE — 82962 GLUCOSE BLOOD TEST: CPT

## 2023-02-03 PROCEDURE — 502240 HCHG MISC OR SUPPLY RC 0272: Performed by: ORTHOPAEDIC SURGERY

## 2023-02-03 PROCEDURE — 160036 HCHG PACU - EA ADDL 30 MINS PHASE I: Performed by: ORTHOPAEDIC SURGERY

## 2023-02-03 DEVICE — IMPLANTABLE DEVICE: Type: IMPLANTABLE DEVICE | Status: FUNCTIONAL

## 2023-02-03 RX ORDER — KETOROLAC TROMETHAMINE 30 MG/ML
INJECTION, SOLUTION INTRAMUSCULAR; INTRAVENOUS PRN
Status: DISCONTINUED | OUTPATIENT
Start: 2023-02-03 | End: 2023-02-03 | Stop reason: SURG

## 2023-02-03 RX ORDER — OXYCODONE HCL 5 MG/5 ML
10 SOLUTION, ORAL ORAL
Status: DISCONTINUED | OUTPATIENT
Start: 2023-02-03 | End: 2023-02-03 | Stop reason: HOSPADM

## 2023-02-03 RX ORDER — CEFAZOLIN SODIUM 1 G/3ML
INJECTION, POWDER, FOR SOLUTION INTRAMUSCULAR; INTRAVENOUS PRN
Status: DISCONTINUED | OUTPATIENT
Start: 2023-02-03 | End: 2023-02-03 | Stop reason: SURG

## 2023-02-03 RX ORDER — DEXAMETHASONE SODIUM PHOSPHATE 4 MG/ML
INJECTION, SOLUTION INTRA-ARTICULAR; INTRALESIONAL; INTRAMUSCULAR; INTRAVENOUS; SOFT TISSUE PRN
Status: DISCONTINUED | OUTPATIENT
Start: 2023-02-03 | End: 2023-02-03 | Stop reason: SURG

## 2023-02-03 RX ORDER — SODIUM CHLORIDE, SODIUM LACTATE, POTASSIUM CHLORIDE, CALCIUM CHLORIDE 600; 310; 30; 20 MG/100ML; MG/100ML; MG/100ML; MG/100ML
INJECTION, SOLUTION INTRAVENOUS CONTINUOUS
Status: ACTIVE | OUTPATIENT
Start: 2023-02-03 | End: 2023-02-03

## 2023-02-03 RX ORDER — HYDROMORPHONE HYDROCHLORIDE 1 MG/ML
0.1 INJECTION, SOLUTION INTRAMUSCULAR; INTRAVENOUS; SUBCUTANEOUS
Status: DISCONTINUED | OUTPATIENT
Start: 2023-02-03 | End: 2023-02-03 | Stop reason: HOSPADM

## 2023-02-03 RX ORDER — OXYCODONE HCL 5 MG/5 ML
5 SOLUTION, ORAL ORAL
Status: DISCONTINUED | OUTPATIENT
Start: 2023-02-03 | End: 2023-02-03 | Stop reason: HOSPADM

## 2023-02-03 RX ORDER — ONDANSETRON 2 MG/ML
INJECTION INTRAMUSCULAR; INTRAVENOUS PRN
Status: DISCONTINUED | OUTPATIENT
Start: 2023-02-03 | End: 2023-02-03 | Stop reason: SURG

## 2023-02-03 RX ORDER — VANCOMYCIN HYDROCHLORIDE 1 G/20ML
INJECTION, POWDER, LYOPHILIZED, FOR SOLUTION INTRAVENOUS
Status: COMPLETED | OUTPATIENT
Start: 2023-02-03 | End: 2023-02-03

## 2023-02-03 RX ORDER — CEFAZOLIN SODIUM 1 G/3ML
2 INJECTION, POWDER, FOR SOLUTION INTRAMUSCULAR; INTRAVENOUS ONCE
Status: DISCONTINUED | OUTPATIENT
Start: 2023-02-03 | End: 2023-02-03 | Stop reason: HOSPADM

## 2023-02-03 RX ORDER — HALOPERIDOL 5 MG/ML
1 INJECTION INTRAMUSCULAR
Status: DISCONTINUED | OUTPATIENT
Start: 2023-02-03 | End: 2023-02-03 | Stop reason: HOSPADM

## 2023-02-03 RX ORDER — ONDANSETRON 2 MG/ML
4 INJECTION INTRAMUSCULAR; INTRAVENOUS
Status: DISCONTINUED | OUTPATIENT
Start: 2023-02-03 | End: 2023-02-03 | Stop reason: HOSPADM

## 2023-02-03 RX ORDER — ACETAMINOPHEN 500 MG
TABLET ORAL
Status: COMPLETED
Start: 2023-02-03 | End: 2023-02-03

## 2023-02-03 RX ORDER — BUPIVACAINE HYDROCHLORIDE 2.5 MG/ML
INJECTION, SOLUTION EPIDURAL; INFILTRATION; INTRACAUDAL
Status: COMPLETED | OUTPATIENT
Start: 2023-02-03 | End: 2023-02-03

## 2023-02-03 RX ORDER — HYDROMORPHONE HYDROCHLORIDE 1 MG/ML
0.2 INJECTION, SOLUTION INTRAMUSCULAR; INTRAVENOUS; SUBCUTANEOUS
Status: DISCONTINUED | OUTPATIENT
Start: 2023-02-03 | End: 2023-02-03 | Stop reason: HOSPADM

## 2023-02-03 RX ORDER — TRANEXAMIC ACID 100 MG/ML
INJECTION, SOLUTION INTRAVENOUS PRN
Status: DISCONTINUED | OUTPATIENT
Start: 2023-02-03 | End: 2023-02-03 | Stop reason: SURG

## 2023-02-03 RX ORDER — ACETAMINOPHEN 325 MG/1
TABLET ORAL PRN
Status: DISCONTINUED | OUTPATIENT
Start: 2023-02-03 | End: 2023-02-03 | Stop reason: SURG

## 2023-02-03 RX ORDER — PHENYLEPHRINE HCL IN 0.9% NACL 0.5 MG/5ML
SYRINGE (ML) INTRAVENOUS PRN
Status: DISCONTINUED | OUTPATIENT
Start: 2023-02-03 | End: 2023-02-03 | Stop reason: SURG

## 2023-02-03 RX ORDER — ROCURONIUM BROMIDE 10 MG/ML
INJECTION, SOLUTION INTRAVENOUS PRN
Status: DISCONTINUED | OUTPATIENT
Start: 2023-02-03 | End: 2023-02-03 | Stop reason: SURG

## 2023-02-03 RX ORDER — HYDROMORPHONE HYDROCHLORIDE 1 MG/ML
0.4 INJECTION, SOLUTION INTRAMUSCULAR; INTRAVENOUS; SUBCUTANEOUS
Status: DISCONTINUED | OUTPATIENT
Start: 2023-02-03 | End: 2023-02-03 | Stop reason: HOSPADM

## 2023-02-03 RX ADMIN — PROPOFOL 150 MG: 10 INJECTION, EMULSION INTRAVENOUS at 07:11

## 2023-02-03 RX ADMIN — BUPIVACAINE HYDROCHLORIDE 10 ML: 2.5 INJECTION, SOLUTION EPIDURAL; INFILTRATION; INTRACAUDAL; PERINEURAL at 06:56

## 2023-02-03 RX ADMIN — FENTANYL CITRATE 100 MCG: 50 INJECTION, SOLUTION INTRAMUSCULAR; INTRAVENOUS at 07:11

## 2023-02-03 RX ADMIN — ROCURONIUM BROMIDE 10 MG: 10 INJECTION, SOLUTION INTRAVENOUS at 08:16

## 2023-02-03 RX ADMIN — KETOROLAC TROMETHAMINE 30 MG: 30 INJECTION, SOLUTION INTRAMUSCULAR at 08:29

## 2023-02-03 RX ADMIN — ACETAMINOPHEN 1000 MG: 325 TABLET, FILM COATED ORAL at 06:50

## 2023-02-03 RX ADMIN — PHENYLEPHRINE HYDROCHLORIDE 50 MCG/MIN: 10 INJECTION INTRAVENOUS at 07:32

## 2023-02-03 RX ADMIN — CEFAZOLIN 2 G: 330 INJECTION, POWDER, FOR SOLUTION INTRAMUSCULAR; INTRAVENOUS at 07:11

## 2023-02-03 RX ADMIN — ROCURONIUM BROMIDE 50 MG: 10 INJECTION, SOLUTION INTRAVENOUS at 07:11

## 2023-02-03 RX ADMIN — DEXAMETHASONE SODIUM PHOSPHATE 4 MG: 4 INJECTION, SOLUTION INTRA-ARTICULAR; INTRALESIONAL; INTRAMUSCULAR; INTRAVENOUS; SOFT TISSUE at 07:21

## 2023-02-03 RX ADMIN — Medication 100 MCG: at 07:23

## 2023-02-03 RX ADMIN — SUGAMMADEX 200 MG: 100 INJECTION, SOLUTION INTRAVENOUS at 08:39

## 2023-02-03 RX ADMIN — SODIUM CHLORIDE, POTASSIUM CHLORIDE, SODIUM LACTATE AND CALCIUM CHLORIDE: 600; 310; 30; 20 INJECTION, SOLUTION INTRAVENOUS at 06:43

## 2023-02-03 RX ADMIN — ONDANSETRON 4 MG: 2 INJECTION INTRAMUSCULAR; INTRAVENOUS at 07:21

## 2023-02-03 RX ADMIN — Medication 100 MCG: at 07:19

## 2023-02-03 RX ADMIN — TRANEXAMIC ACID 1000 MG: 100 INJECTION, SOLUTION INTRAVENOUS at 07:11

## 2023-02-03 RX ADMIN — Medication 200 MCG: at 07:27

## 2023-02-03 RX ADMIN — BUPIVACAINE 10 ML: 13.3 INJECTION, SUSPENSION, LIPOSOMAL INFILTRATION at 06:56

## 2023-02-03 RX ADMIN — ROCURONIUM BROMIDE 10 MG: 10 INJECTION, SOLUTION INTRAVENOUS at 07:58

## 2023-02-03 RX ADMIN — ROCURONIUM BROMIDE 20 MG: 10 INJECTION, SOLUTION INTRAVENOUS at 07:48

## 2023-02-03 ASSESSMENT — ACTIVITIES OF DAILY LIVING (ADL): TOILETING: INDEPENDENT

## 2023-02-03 ASSESSMENT — COGNITIVE AND FUNCTIONAL STATUS - GENERAL
SUGGESTED CMS G CODE MODIFIER DAILY ACTIVITY: CJ
DRESSING REGULAR UPPER BODY CLOTHING: A LITTLE
DRESSING REGULAR LOWER BODY CLOTHING: A LITTLE
HELP NEEDED FOR BATHING: A LITTLE
DAILY ACTIVITIY SCORE: 21

## 2023-02-03 ASSESSMENT — FIBROSIS 4 INDEX: FIB4 SCORE: 1.43

## 2023-02-03 ASSESSMENT — PAIN DESCRIPTION - PAIN TYPE
TYPE: SURGICAL PAIN
TYPE: SURGICAL PAIN

## 2023-02-03 ASSESSMENT — GAIT ASSESSMENTS: DISTANCE (FEET): 50

## 2023-02-03 NOTE — THERAPY
Occupational Therapy   Initial Evaluation     Patient Name: Myke Steele  Age:  77 y.o., Sex:  male  Medical Record #: [id number]  Today's Date: 2/3/2023     Precautions  Precautions: (P) Immobilizer Left Upper Extremity, Non Weight Bearing Left Upper Extremity, Fall Risk    Assessment  Patient is 77 y.o. male s/p L Reverse TSA. Immobilizer in place to LUE, pt A&Ox4 and motivated for home today. O2 on RA maintains >90%. L hand dominant. Spouse present for caregiver training. Education re: proper brace fit, doffing/donning of brace, safe UB dressing tech's, pendulum's (demo'd and handout issued- block in effect), gentle ROM to L hand,wrist,elbow, home safety during ADL's, DME/AE use. Rec spouse to obtain sh seat. Pt performs LB dressing with SBA. Toileting with Spv/Mod Indep. Min/ModA with UB dressing incl brace. Walks with SPC, SBA ~ 100', ADL transfers with Mod Indep/Spv. Pt and spouse with no further questions/concerns; DC ready from OT.                Plan  Eval only.    DC Equipment Recommendations: (P) None  Discharge Recommendations: (P) Anticipate that the patient will have no further occupational therapy needs after discharge from the hospital     Subjective  Pleasant and cooperative     Objective     02/03/23 1110   Prior Living Situation   Prior Services Home-Independent   Housing / Facility 1 Story House   Steps Into Home 2   Steps In Home 0   Rail Both Rail (Steps into Home)   Bathroom Set up Walk In Shower   Equipment Owned Single Point Cane;Raised Toilet Seat Without Arms;Hand Held Shower   Lives with - Patient's Self Care Capacity Spouse   Comments Rec spouse to obain sh seat   Prior Level of ADL Function   Self Feeding Independent   Grooming / Hygiene Independent   Bathing Independent   Dressing Independent   Toileting Independent   Prior Level of IADL Function   Medication Management Independent   Laundry Independent   Kitchen Mobility Independent   Finances Independent   Home Management  Independent   Shopping Independent   Prior Level Of Mobility Independent With Device in Home   Driving / Transportation Driving Independent   Occupation (Pre-Hospital Vocational) Retired Due To Age   Leisure Interests Hobbies   Precautions   Precautions Immobilizer Left Upper Extremity;Non Weight Bearing Left Upper Extremity;Fall Risk   Vitals   O2 Delivery Device None - Room Air   Pain 0 - 10 Group   Location Shoulder   Location Orientation Left   Therapist Pain Assessment Post Activity Pain Same as Prior to Activity;Nurse Notified   Cognition    Cognition / Consciousness WDL   Comments Ox4   Passive ROM Upper Body   Passive ROM Upper Body WDL   Comments pendulums demo'd and handout issued- block still in effect   Active ROM Upper Body   Active ROM Upper Body  WDL  (RUE)   Dominant Hand Left;Using Non-Dominant Hand   Comments LUE in brace   Strength Upper Body   Upper Body Strength  Not Tested   Sensation Upper Body   Upper Extremity Sensation  X   Upper Body Muscle Tone   Upper Body Muscle Tone  WDL   Coordination Upper Body   Coordination X   Balance Assessment   Sitting Balance (Static) Good   Sitting Balance (Dynamic) Good   Standing Balance (Static) Fair +   Standing Balance (Dynamic) Fair   Weight Shift Sitting Good   Weight Shift Standing Good   Bed Mobility    Scooting Modified Independent   ADL Assessment   Upper Body Dressing Minimal Assist   Lower Body Dressing Standby Assist   Toileting Modified Independent   How much help from another person does the patient currently need...   Putting on and taking off regular lower body clothing? 3   Bathing (including washing, rinsing, and drying)? 3   Toileting, which includes using a toilet, bedpan, or urinal? 4   Putting on and taking off regular upper body clothing? 3   Taking care of personal grooming such as brushing teeth? 4   Eating meals? 4   6 Clicks Daily Activity Score 21   Functional Mobility   Sit to Stand Modified Independent   Bed, Chair, Wheelchair  Transfer Supervised   Toilet Transfers Standby Assist   Mobility SPC in R hand- steady. Pt and spouse report md fried pt to use spc in L hand once block worn off (pt regularly uses spc in L hand).   Distance (Feet) 50   # of Times Distance was Traveled 2   Visual Perception   Visual Perception  WDL   Activity Tolerance   Sitting in Chair >30   Sitting Edge of Bed 15  (edge of chair)   Standing 10 total   Education Group   Education Provided Brace Wear and Care;Home Safety;Activities of Daily Living;Upper Extremity Range of Motion   Role of Occupational Therapist Patient Response Patient;Family;Acceptance;Explanation;Verbal Demonstration   Upper Ext ROM Patient Response Patient;Family;Acceptance;Explanation;Demonstration;Handout;Verbal Demonstration   Brace Wear & Care Patient Response Patient;Family;Acceptance;Explanation;Verbal Demonstration   Home Safety Patient Response Patient;Family;Acceptance;Explanation;Verbal Demonstration   ADL Patient Response Patient;Family;Acceptance;Explanation;Verbal Demonstration;Action Demonstration   Anticipated Discharge Equipment and Recommendations   DC Equipment Recommendations None   Discharge Recommendations Anticipate that the patient will have no further occupational therapy needs after discharge from the hospital

## 2023-02-03 NOTE — ANESTHESIA PROCEDURE NOTES
Airway    Date/Time: 2/3/2023 7:14 AM  Performed by: Deyvi Bentley M.D.  Authorized by: Deyvi Bentley M.D.     Location:  OR  Urgency:  Elective  Indications for Airway Management:  Anesthesia      Spontaneous Ventilation: absent    Sedation Level:  Deep  Preoxygenated: Yes    Patient Position:  Sniffing  Mask Difficulty Assessment:  1 - vent by mask  Final Airway Type:  Endotracheal airway  Final Endotracheal Airway:  ETT  Cuffed: Yes    Technique Used for Successful ETT Placement:  Video laryngoscopy    Insertion Site:  Oral  Blade Type:  Glide  Laryngoscope Blade/Videolaryngoscope Blade Size:  3  ETT Size (mm):  7.5  Measured from:  Teeth  ETT to Teeth (cm):  22  Placement Verified by: auscultation and capnometry    Cormack-Lehane Classification:  Grade I - full view of glottis  Number of Attempts at Approach:  1

## 2023-02-03 NOTE — ANESTHESIA PROCEDURE NOTES
Peripheral Block    Date/Time: 2/3/2023 6:56 AM  Performed by: Deyvi Bentley M.D.  Authorized by: Deyvi Bentley M.D.     Patient Location:  Pre-op  Start Time:  2/3/2023 6:56 AM  End Time:  2/3/2023 7:03 AM  Reason for Block: at surgeon's request and post-op pain management ONLY    patient identified, IV checked, site marked, risks and benefits discussed, surgical consent, monitors and equipment checked, pre-op evaluation and timeout performed    Patient Position:  Sitting  Prep: ChloraPrep    Monitoring:  Heart rate, continuous pulse ox and cardiac monitor  Block Region:  Upper Extremity  Upper Extremity - Block Type:  BRACHIAL PLEXUS block, Interscalene approach    Laterality:  Left  Procedures: ultrasound guided  Image captured, interpreted and electronically stored.  Local Infiltration:  Lidocaine  Strength:  2 %  Dose:  3 ml  Block Type:  Single-shot  Needle Length:  100mm  Needle Gauge:  21 G  Needle Localization:  Ultrasound guidance  Injection Assessment:  Negative aspiration for heme, no paresthesia on injection, incremental injection and local visualized surrounding nerve on ultrasound  Evidence of intravascular injection: No     US Guided Interscalene Brachial Plexus Block   US transducer placed on the neck in oblique plane approximately at the level of C6.  Anterior and Middle Scalene (MSM) muscles identified with nerve trunks identified between the muscles.  Needle inserted lateral to probe and advanced under direct visualization through the MSM into a perineural position.  After negative aspiration, LA injected with ease and visualized surrounding the nerve trunks. Injected Exparel 10mL and Bupiv 0.25% 10mL (diluted bupiv 0.5% 5mL with 5mL NS).

## 2023-02-03 NOTE — OR NURSING
Pt assisted to stage 2 via chacorta, assisted with dressing and up to chair. Wife present. DC education provided to both pt and wife, both verbalized understandin. Pt worked with OT.  in stage 2 to see pt. Updated that pt O2 sat remains 89-92% on RA.  Ok'd pt to DC home. Assisted ot car in WC in stable condition.

## 2023-02-03 NOTE — ANESTHESIA TIME REPORT
Anesthesia Start and Stop Event Times     Date Time Event    2/3/2023 0705 Ready for Procedure     0707 Anesthesia Start     0846 Anesthesia Stop        Responsible Staff  02/03/23    Name Role Begin End    Min CHLOE Bentley M.D. Anesth 0707 0846        Overtime Reason:  no overtime (within assigned shift)    Comments:

## 2023-02-03 NOTE — OR SURGEON
Immediate Post OP Note    PreOp Diagnosis: LEFT shoulder RC tear arthropathy, osteoarthritis      PostOp Diagnosis: SAME       Procedure(s):  LEFT REVERSE TOTAL SHOULDER ARTHROPLASTY - Wound Class: Clean with proximal biceps tenodesis    Surgeon(s):  Radha Brothers M.D.    Anesthesiologist/Type of Anesthesia:  Anesthesiologist: Deyvi Bentley M.D./General    Surgical Staff:  Circulator: Tiffany Figueroa R.N.  Limb Iglesias: Abilio Stevens  Relief Circulator: Monica Haley R.N.  Scrub Person: Alberto Vera  First Assist: Tato Terry C.N.A.    Specimens removed if any:  * No specimens in log *    Estimated Blood Loss: 100 cc    Findings: as above    Complications: none    Tita        2/3/2023 8:50 AM Radha Brothers M.D.

## 2023-02-03 NOTE — ANESTHESIA POSTPROCEDURE EVALUATION
Patient: Myke Steele    Procedure Summary     Date: 02/03/23 Room / Location:  OR  / SURGERY [facility]    Anesthesia Start: 0707 Anesthesia Stop: 0846    Procedure: LEFT REVERSE TOTAL SHOULDER ARTHROPLASTY (Left: Shoulder) Diagnosis: (ROTATOR CUFF ARTHROPATHY OF LEFT SHOULDER)    Surgeons: Radha Brothers M.D. Responsible Provider: Deyvi Bentley M.D.    Anesthesia Type: general, peripheral nerve block ASA Status: 3          Final Anesthesia Type: general, peripheral nerve block  Last vitals  BP   Blood Pressure : 106/56    Temp   36.5 °C (97.7 °F)    Pulse   77   Resp   18    SpO2   91 %      Anesthesia Post Evaluation    Patient location during evaluation: PACU  Patient participation: complete - patient participated  Level of consciousness: awake and alert    Airway patency: patent  Anesthetic complications: no  Cardiovascular status: hemodynamically stable  Respiratory status: acceptable  Hydration status: euvolemic    PONV: none    patient able to participate, but full recovery from regional anesthesia has not occurred and is not expected within the stipulated timeframe for the completion of the evaluation      There were no known notable events for this encounter.     Nurse Pain Score: 3 (NPRS)

## 2023-02-03 NOTE — ANESTHESIA PREPROCEDURE EVALUATION
Case: [id number] Date/Time: 02/03/23 0645    Procedure: LEFT REVERSE TOTAL SHOULDER ARTHROPLASTY    Pre-op diagnosis: ROTATOR CUFF ARTHROPATHY OF LEFT SHOULDER    Location: [facility] OR 04 / SURGERY [facility]    Surgeons: Radha Brothers M.D.      76yo M w/ hx of CAD s/p stents in 2007, HTN, T2DM (A1c 6.1 in 11/2022), Hypothyroid, GERD. Cards clearance on 1/25/23. Gluc 147 in preop. Last dose aspirin more than 7 days ago.    ECHO (10/22): EF 65%  MPI (7/22): old scar.    Relevant Problems   CARDIAC   (positive) Atherosclerosis of aorta (HCC)   (positive) Coronary arteriosclerosis due to lipid rich plaque   (positive) Essential hypertension   (positive) Ischemic heart disease due to coronary artery obstruction (HCC)   (positive) Stented coronary artery      ENDO   (positive) Hypothyroid   (positive) Thyroid activity decreased   (positive) Type 2 diabetes mellitus with circulatory disorder, without long-term current use of insulin (HCC)       Physical Exam    Airway   Mallampati: II  TM distance: >3 FB  Neck ROM: full       Cardiovascular - normal exam  Rhythm: regular  Rate: normal  (-) murmur     Dental - normal exam           Pulmonary - normal exam  Breath sounds clear to auscultation     Abdominal    Neurological - normal exam                 Anesthesia Plan    ASA 3   ASA physical status 3 criteria: CAD/stents (> 3 months)    Plan - general and peripheral nerve block     Peripheral nerve block will be post-op pain control  Airway plan will be ETT          Induction: intravenous    Postoperative Plan: Postoperative administration of opioids is intended.    Pertinent diagnostic labs and testing reviewed    Informed Consent:    Anesthetic plan and risks discussed with patient.    Use of blood products discussed with: patient whom consented to blood products.

## 2023-02-03 NOTE — DISCHARGE INSTRUCTIONS
Shoulder Total Joint Replacement Post-Operative Instructions     Nerve Block:  The effects of the nerve block may last from 12-60 hours depending on the medication used.   It is recommended to sleep in a semi upright position for the first few days as the nerve block may cause shortness of breath when lying flat.         Dressings: Keep your dressing clean after surgery.  A waterproof adherent dressing will be placed over your incision at the conclusion of your surgery.  Change your dressing one week following surgery with the extra dressing provided.  Please inspect your incision for any surrounding redness or drainage when you change the dressing.  If necessary, you may clean the edges of the wound with soap & water.  Dry the area with a clean cloth.  Then, place a new dressing over your incision.  If the dressing becomes loose or saturated with blood or fluid, it may be replaced at any time. Please keep your incision covered until your first post-operative appointment. At your first post-op appointment, your dressing will be removed.   In most cases, the surgical incision will not need to be covered after two weeks.     Baths/Pools/Hot Tubs:  Please do not submerge your incision in a hot tub, pool, or bath until at least six weeks after surgery.  Make sure that your incision is healed with no remaining scab or drainage before submerging in water.     Compression Hose/KRANTHI hose: Compression hose/KRANTHI hose will be placed on your legs prior to surgery at the hospital.  Please keep these on for at least two days, as they help control leg swelling as well as reduce the risk of a blood clot.   You may remove the compression hose temporarily to shower.     Ice: Use ice or cold therapy to your shoulder as you feel necessary to help with the pain and swelling.  Typically, this is 20 minutes on and 20 minutes off for the first several days following surgery.  Cold therapy units can be used as directed.         Sling:  Please  wear your sling when walking about and when sleeping.  You may carefully remove the sling when awake and seated.  Please support your forearm on a pillow when the sling is removed. You may remove the pillow portion of the sling after two weeks. If you have questions regarding how to wear sling or need a replacement, please contact [facility] @ [phone].     Activity:  You may remove your sling when awake and seated.  Please support your forearm on a pillow.  Once your sling is removed, you may move your elbow, wrist and hand as tolerated. Please do not lift anything heavier than a cup of coffee on your operative side.  Codman/pendulum exercises are encouraged 3-5 times a day. You will need to remove the sling to perform these exercises.     Pain Medication:  Take your pain medication as prescribed, only as needed.  Do not drive or operate machinery while taking narcotic pain medication.  You may resume anti-inflammatory medications any time after surgery. Please take medication with food to avoid stomach upset.     Aspirin:  Please take Aspirin 81 mg twice daily starting the morning after surgery and continue for 2 weeks, to help prevent a blood clot.     Driving:  Please arrange a ride to and from the hospital/surgery center on the day of surgery.   You may drive as soon as you are off pain medication and feel comfortable behind the wheel.     Physical Therapy:  Contact a physical therapy facility approved by your insurance plan to schedule your initial visits.  Typically, physical therapy is scheduled twice weekly to begin at approximately two weeks after surgery for primary total shoulder patients and six weeks after surgery for reverse total shoulder patients.     Follow-up:  Your first post-op appointment should be scheduled 10-14 days after surgery.  The details of your procedure and specifics of rehabilitation will be discussed.     Problems/concerns: If you have a problem or significant concern  (unexpected pain, excessive bleeding or discharge from your incisions, fever or chills) or do not hesitate to call the office @ [phone] or go to your local emergency room.    Radha Brothers MD [facility] [address], [address] [place], [place] [address] Office telephone:  [phone] Office fax: [phone]      If any questions arise, call your provider.  If your provider is not available, please feel free to call the Surgical Center at [phone]    What to Expect Post Anesthesia    Rest and take it easy for the first 24 hours.  A responsible adult is recommended to remain with you during that time.  It is normal to feel sleepy.  We encourage you to not do anything that requires balance, judgment or coordination.    FOR 24 HOURS DO NOT:  Drive, operate machinery or run household appliances.  Drink beer or alcoholic beverages.  Make important decisions or sign legal documents.    To avoid nausea, slowly advance diet as tolerated, avoiding spicy or greasy foods for the first day.  Add more substantial food to your diet according to your provider's instructions.  Babies can be fed formula or breast milk as soon as they are hungry.  INCREASE FLUIDS AND FIBER TO AVOID CONSTIPATION.    MILD FLU-LIKE SYMPTOMS ARE NORMAL.  YOU MAY EXPERIENCE GENERALIZED MUSCLE ACHES, THROAT IRRITATION, HEADACHE AND/OR SOME NAUSEA.    .    MEDICATIONS: Resume taking daily medication.  Take prescribed pain medication with food.  If no medication is prescribed, you may take non-aspirin pain medication if needed.  PAIN MEDICATION CAN BE VERY CONSTIPATING.  Take a stool softener or laxative such as senokot, pericolace, or milk of magnesia if needed.    Last pain medication given at [facility]

## 2023-02-03 NOTE — OR NURSING
0844 Pt arrived from OR post   LEFT REVERSE TOTAL SHOULDER ARTHROPLASTY Left   , report received. Pt breathing spontaneous and unlabored. Dressing CDI with cold pack applied. Periferal pulses intact with cap refill <2 sec.     0855 Pt wife updated on progress, POC and ETA for DC    0910 IS education given to Pt. Pt demonstrates appropriate use of IS. Currently pulling 1000mL.    0946 Per Dr Sheree RAMOS to move to phase II to work on breathing exercises and DC @ 89% or better with update before DC. Pt states pain is controled and tolerable, denies nausea. Pt tolerating PO fluids. Pt to phase II

## 2023-02-03 NOTE — OP REPORT
DATE OF SERVICE:  02/03/2023     PREOPERATIVE DIAGNOSIS:  Severe glenohumeral joint arthritis with rotator cuff   tear arthropathy, left shoulder.     POSTOPERATIVE DIAGNOSIS:  Severe glenohumeral joint arthritis with rotator   cuff tear arthropathy, left shoulder.     PROCEDURE:  Left reverse total shoulder arthroplasty with proximal biceps   tenodesis.     SURGEON:  Radha Brothers MD     ASSISTANT:  Tato Terry CFA     ANESTHESIOLOGIST:  Deyvi Bentley MD     TYPE OF ANESTHESIA:  General, with preoperative interscalene nerve block using   Exparel.     INTRAVENOUS FLUID:  1 liter crystalloid.     ESTIMATED BLOOD LOSS:  100 mL     DRAINS:  None.     SPECIMENS:  None.     COMPLICATIONS:  None.     IMPLANTS:  Tita size 29 baseplate, full wedge with central screw and 2   peripheral screws, standard 42 glenosphere, size 3 PerFORM humeral stem with   42x0 polyethylene insert.     REASON FOR PROCEDURE:  The patient is a 77-year-old male with a longstanding   history of left shoulder problems.  He has previously undergone a rotator cuff   repair.  This ultimately failed and he developed arthritis.  We decided to   proceed with reverse total shoulder arthroplasty.     OPERATION:  The patient was given a left interscalene nerve block using   Exparel by the anesthesiologist before surgery.  Once back in the operating   room, a breathing tube was placed.  He was given 2 grams of IV Ancef as well   as 1 gram of tranexamic acid.  He was placed in the beach-chair position.  The   left upper extremity was prepped with ChloraPrep and draped in standard   sterile fashion.  It was then approached with the plasma blade and a   deltopectoral incision was made.  The cephalic vein was identified and   retracted laterally.  A Bankart retractor was placed.  There was remaining,   although somewhat thin subscapularis tendon.  This was peeled off and the   upper corner was tagged.  The superior rotator cuff was noted to be torn and    the humerus was subluxated anteriorly.  A cutting guide was pinned in place   and the cut was made.  There was some suture noted from prior repair, which   was removed.  A cut protector was placed and retractors were placed   circumferentially around the glenoid.  The mixed reality headset was used and   the central pin location was identified, then the guide was brought up and the   pin was drilled in place.  The Simone reamer was used initially followed by   the 15-degree full wedge reamer set at the appropriate location.  The boss was   drilled and the bicortical drill was used.  The central screw was measured to   45 mm.  The tap was used.  The wound was irrigated copiously.  A 29 mm   baseplate was fashioned with a central 45 mm screw and compressed down to the   prepared glenoid bone surface.  An excellent fit was noted.  Two peripheral   screws were placed superiorly and inferiorly.  The Thrinacia reamer was used and   a 42 trial glenosphere was impacted and set.  Attention was then turned back   to the proximal humerus.  It was prepared for a size 3 PerFORM glenoid with   the planer used to flatten the cut surface with this trial implant in place.    Trial polyethylene were used.  Attention was turned back to the glenosphere   where the trial 42 was removed and a 42 glenosphere was impacted with the   central set screw tightened.  Attention was then turned back to the proximal   humerus.  The trial 3 was removed and 2 drill holes were placed through the   lesser tuberosity with a #2 XBraid placed through each one.  After copious   irrigation, a size 3 Perform stem was impacted, followed 42x0 polyethylene   insert.  The shoulder was reduced.  Excellent tension was noted as well as   stability.  The wound was irrigated copiously.  The subscapularis tendon was   indeed repairable with the XBraid, had been placed through the lesser   tuberosity prior to placement of the stem.  The biceps tendon was tenodesed.     It was localized and then tenodesed with an additional XBraid to the   subscapularis tendon and surrounding soft tissue in situ.  One gram of   vancomycin powder was placed in the deep and superficial soft tissues.  The   deltopectoral interval was closed with 0 Vicryl.  A running 2-0 Monocryl was   used in the subcutaneous tissue followed by running 3-0 Monocryl in the   subcuticular layer.  Benzoin and Steri-Strips were placed followed by sterile   Aquacel dressing.  All drapes were removed and the arm was carefully taken out   of traction and placed into a shoulder abduction sling.  The patient was   placed supine on a stretcher and taken to recovery room, in stable condition.        ______________________________  MD BRIANA YOUNG/PARK    DD:  02/03/2023 08:56  DT:  02/03/2023 09:24    Job#:  [id number]

## 2023-02-03 NOTE — OR NURSING
Pt allergies and NPO status verified. Home medications reconciled, preferred pharmacy verified. Belongings secured in locker. Pt verbalizes understanding of pain scale, expected course of stay, and plan of care. Surgical site verified with pt. Triple aim completed. IV access established. All questions answered. Bed in lowest position, call light within reach.   MD Bentley updated and aware of cardiac note, rash to bilateral legs, and FSBG of 147.

## 2023-02-17 DIAGNOSIS — N40.1 BENIGN PROSTATIC HYPERPLASIA WITH URINARY OBSTRUCTION: Chronic | ICD-10-CM

## 2023-02-17 DIAGNOSIS — N13.8 BENIGN PROSTATIC HYPERPLASIA WITH URINARY OBSTRUCTION: Chronic | ICD-10-CM

## 2023-02-17 RX ORDER — TADALAFIL 5 MG/1
5 TABLET ORAL DAILY
Qty: 90 TABLET | Refills: 1 | Status: SHIPPED | OUTPATIENT
Start: 2023-02-17 | End: 2023-09-23 | Stop reason: SDUPTHER

## 2023-02-22 DIAGNOSIS — M62.838 MUSCLE SPASM: ICD-10-CM

## 2023-02-22 RX ORDER — CYCLOBENZAPRINE HCL 10 MG
TABLET ORAL
Qty: 40 TABLET | Refills: 1 | Status: SHIPPED | OUTPATIENT
Start: 2023-02-22 | End: 2023-05-20 | Stop reason: SDUPTHER

## 2023-04-03 NOTE — PROCEDURE: ADDITIONAL NOTES
Detail Level: Simple
Additional Notes: Approximately 4-5cm
Render Risk Assessment In Note?: no
- - -

## 2023-04-26 ENCOUNTER — NON-PROVIDER VISIT (OUTPATIENT)
Dept: INTERNAL MEDICINE | Facility: IMAGING CENTER | Age: 78
End: 2023-04-26
Payer: MEDICARE

## 2023-04-26 ENCOUNTER — HOSPITAL ENCOUNTER (OUTPATIENT)
Facility: MEDICAL CENTER | Age: 78
End: 2023-04-26
Attending: PHYSICIAN ASSISTANT
Payer: MEDICARE

## 2023-04-26 LAB — PSA SERPL-MCNC: 8.65 NG/ML (ref 0–4)

## 2023-04-26 PROCEDURE — 84153 ASSAY OF PSA TOTAL: CPT

## 2023-05-16 DIAGNOSIS — I10 ESSENTIAL HYPERTENSION: ICD-10-CM

## 2023-05-16 RX ORDER — CARVEDILOL 12.5 MG/1
TABLET ORAL
Qty: 180 TABLET | Refills: 3 | Status: SHIPPED | OUTPATIENT
Start: 2023-05-16

## 2023-05-20 DIAGNOSIS — M62.838 MUSCLE SPASM: ICD-10-CM

## 2023-05-20 RX ORDER — MELOXICAM 7.5 MG/1
TABLET ORAL
Qty: 180 TABLET | Refills: 0 | Status: SHIPPED | OUTPATIENT
Start: 2023-05-20 | End: 2023-10-13 | Stop reason: SDUPTHER

## 2023-05-20 RX ORDER — CYCLOBENZAPRINE HCL 10 MG
TABLET ORAL
Qty: 40 TABLET | Refills: 1 | Status: SHIPPED | OUTPATIENT
Start: 2023-05-20 | End: 2023-10-13 | Stop reason: SDUPTHER

## 2023-05-30 ENCOUNTER — APPOINTMENT (RX ONLY)
Dept: URBAN - METROPOLITAN AREA CLINIC 6 | Facility: CLINIC | Age: 78
Setting detail: DERMATOLOGY
End: 2023-05-30

## 2023-05-30 DIAGNOSIS — L82.1 OTHER SEBORRHEIC KERATOSIS: ICD-10-CM

## 2023-05-30 DIAGNOSIS — Z85.828 PERSONAL HISTORY OF OTHER MALIGNANT NEOPLASM OF SKIN: ICD-10-CM

## 2023-05-30 DIAGNOSIS — Z71.89 OTHER SPECIFIED COUNSELING: ICD-10-CM

## 2023-05-30 DIAGNOSIS — B35.3 TINEA PEDIS: ICD-10-CM

## 2023-05-30 DIAGNOSIS — D22 MELANOCYTIC NEVI: ICD-10-CM

## 2023-05-30 DIAGNOSIS — D18.0 HEMANGIOMA: ICD-10-CM

## 2023-05-30 DIAGNOSIS — L72.8 OTHER FOLLICULAR CYSTS OF THE SKIN AND SUBCUTANEOUS TISSUE: ICD-10-CM

## 2023-05-30 DIAGNOSIS — L81.4 OTHER MELANIN HYPERPIGMENTATION: ICD-10-CM

## 2023-05-30 PROBLEM — D22.5 MELANOCYTIC NEVI OF TRUNK: Status: ACTIVE | Noted: 2023-05-30

## 2023-05-30 PROBLEM — D18.01 HEMANGIOMA OF SKIN AND SUBCUTANEOUS TISSUE: Status: ACTIVE | Noted: 2023-05-30

## 2023-05-30 PROCEDURE — ? DEFER

## 2023-05-30 PROCEDURE — 99213 OFFICE O/P EST LOW 20 MIN: CPT

## 2023-05-30 PROCEDURE — ? SUNSCREEN RECOMMENDATIONS

## 2023-05-30 PROCEDURE — ? PRESCRIPTION MEDICATION MANAGEMENT

## 2023-05-30 PROCEDURE — ? COUNSELING

## 2023-05-30 PROCEDURE — ? SUNSCREEN TREATMENT REGIMEN

## 2023-05-30 PROCEDURE — ? PRESCRIPTION

## 2023-05-30 RX ORDER — KETOCONAZOLE 20 MG/G
CREAM TOPICAL QD
Qty: 30 | Refills: 3 | Status: ERX | COMMUNITY
Start: 2023-05-30

## 2023-05-30 RX ADMIN — KETOCONAZOLE 1: 20 CREAM TOPICAL at 00:00

## 2023-05-30 ASSESSMENT — LOCATION DETAILED DESCRIPTION DERM
LOCATION DETAILED: PERIUMBILICAL SKIN
LOCATION DETAILED: LEFT PROXIMAL DORSAL FOREARM
LOCATION DETAILED: LEFT RADIAL DORSAL HAND
LOCATION DETAILED: LEFT POSTERIOR NECK
LOCATION DETAILED: RIGHT ANKLE
LOCATION DETAILED: RIGHT ULNAR DORSAL HAND
LOCATION DETAILED: SUPERIOR THORACIC SPINE
LOCATION DETAILED: STERNUM
LOCATION DETAILED: EPIGASTRIC SKIN

## 2023-05-30 ASSESSMENT — LOCATION SIMPLE DESCRIPTION DERM
LOCATION SIMPLE: LEFT HAND
LOCATION SIMPLE: ABDOMEN
LOCATION SIMPLE: POSTERIOR NECK
LOCATION SIMPLE: RIGHT HAND
LOCATION SIMPLE: CHEST
LOCATION SIMPLE: LEFT FOREARM
LOCATION SIMPLE: UPPER BACK
LOCATION SIMPLE: RIGHT ANKLE

## 2023-05-30 ASSESSMENT — LOCATION ZONE DERM
LOCATION ZONE: NECK
LOCATION ZONE: LEG
LOCATION ZONE: ARM
LOCATION ZONE: TRUNK
LOCATION ZONE: HAND

## 2023-05-30 NOTE — PROCEDURE: PRESCRIPTION MEDICATION MANAGEMENT
Render In Strict Bullet Format?: No
Detail Level: Zone
Discontinue Regimen: ciclopirox 0.77 % topical cream Bid for maintenance
Initiate Treatment: ketoconazole 2 % topical cream

## 2023-06-15 DIAGNOSIS — E78.5 DYSLIPIDEMIA: ICD-10-CM

## 2023-07-01 ENCOUNTER — PATIENT MESSAGE (OUTPATIENT)
Dept: INTERNAL MEDICINE | Facility: IMAGING CENTER | Age: 78
End: 2023-07-01
Payer: MEDICARE

## 2023-07-01 DIAGNOSIS — I10 ESSENTIAL HYPERTENSION: ICD-10-CM

## 2023-07-01 RX ORDER — LISINOPRIL 40 MG/1
40 TABLET ORAL DAILY
Qty: 90 TABLET | Refills: 3 | Status: SHIPPED | OUTPATIENT
Start: 2023-07-01

## 2023-08-07 ENCOUNTER — PATIENT MESSAGE (OUTPATIENT)
Dept: INTERNAL MEDICINE | Facility: IMAGING CENTER | Age: 78
End: 2023-08-07
Payer: MEDICARE

## 2023-08-08 RX ORDER — LEVOTHYROXINE SODIUM 0.07 MG/1
75 TABLET ORAL
Qty: 90 TABLET | Refills: 3 | Status: SHIPPED | OUTPATIENT
Start: 2023-08-08

## 2023-08-21 RX ORDER — OMEPRAZOLE 20 MG/1
20 CAPSULE, DELAYED RELEASE ORAL DAILY
Qty: 90 CAPSULE | Refills: 3 | Status: SHIPPED | OUTPATIENT
Start: 2023-08-21

## 2023-08-21 RX ORDER — OMEPRAZOLE 20 MG/1
20 CAPSULE, DELAYED RELEASE ORAL DAILY
Qty: 90 CAPSULE | Refills: 3 | Status: SHIPPED
Start: 2023-08-21 | End: 2023-08-21 | Stop reason: SDUPTHER

## 2023-09-13 ENCOUNTER — PATIENT MESSAGE (OUTPATIENT)
Dept: INTERNAL MEDICINE | Facility: IMAGING CENTER | Age: 78
End: 2023-09-13
Payer: MEDICARE

## 2023-09-13 DIAGNOSIS — I10 ESSENTIAL HYPERTENSION: ICD-10-CM

## 2023-09-13 RX ORDER — EMPAGLIFLOZIN 10 MG/1
10 TABLET, FILM COATED ORAL DAILY
Qty: 90 TABLET | Refills: 3 | Status: SHIPPED | OUTPATIENT
Start: 2023-09-13 | End: 2023-09-18 | Stop reason: SDUPTHER

## 2023-09-13 RX ORDER — AMLODIPINE BESYLATE 10 MG/1
10 TABLET ORAL DAILY
Qty: 90 TABLET | Refills: 3 | Status: SHIPPED | OUTPATIENT
Start: 2023-09-13 | End: 2023-09-18 | Stop reason: SDUPTHER

## 2023-09-18 ENCOUNTER — PATIENT MESSAGE (OUTPATIENT)
Dept: INTERNAL MEDICINE | Facility: IMAGING CENTER | Age: 78
End: 2023-09-18
Payer: MEDICARE

## 2023-09-18 DIAGNOSIS — I10 ESSENTIAL HYPERTENSION: ICD-10-CM

## 2023-09-18 RX ORDER — EMPAGLIFLOZIN 10 MG/1
10 TABLET, FILM COATED ORAL DAILY
Qty: 90 TABLET | Refills: 3 | Status: SHIPPED | OUTPATIENT
Start: 2023-09-18

## 2023-09-18 RX ORDER — AMLODIPINE BESYLATE 10 MG/1
10 TABLET ORAL DAILY
Qty: 90 TABLET | Refills: 3 | Status: SHIPPED | OUTPATIENT
Start: 2023-09-18 | End: 2023-09-23 | Stop reason: SDUPTHER

## 2023-09-23 DIAGNOSIS — N40.1 BENIGN PROSTATIC HYPERPLASIA WITH URINARY OBSTRUCTION: Chronic | ICD-10-CM

## 2023-09-23 DIAGNOSIS — N13.8 BENIGN PROSTATIC HYPERPLASIA WITH URINARY OBSTRUCTION: Chronic | ICD-10-CM

## 2023-09-23 DIAGNOSIS — I10 ESSENTIAL HYPERTENSION: ICD-10-CM

## 2023-09-23 RX ORDER — AMLODIPINE BESYLATE 10 MG/1
10 TABLET ORAL DAILY
Qty: 90 TABLET | Refills: 3 | Status: SHIPPED | OUTPATIENT
Start: 2023-09-23

## 2023-09-23 RX ORDER — TADALAFIL 5 MG/1
5 TABLET ORAL DAILY
Qty: 90 TABLET | Refills: 1 | Status: SHIPPED | OUTPATIENT
Start: 2023-09-23

## 2023-10-11 ENCOUNTER — HOSPITAL ENCOUNTER (OUTPATIENT)
Facility: MEDICAL CENTER | Age: 78
End: 2023-10-11
Attending: PHYSICIAN ASSISTANT
Payer: MEDICARE

## 2023-10-11 ENCOUNTER — NON-PROVIDER VISIT (OUTPATIENT)
Dept: INTERNAL MEDICINE | Facility: IMAGING CENTER | Age: 78
End: 2023-10-11
Payer: MEDICARE

## 2023-10-11 LAB — PSA SERPL-MCNC: 9.33 NG/ML (ref 0–4)

## 2023-10-11 PROCEDURE — 84153 ASSAY OF PSA TOTAL: CPT

## 2023-10-13 DIAGNOSIS — M62.838 MUSCLE SPASM: ICD-10-CM

## 2023-10-13 RX ORDER — MELOXICAM 7.5 MG/1
TABLET ORAL
Qty: 180 TABLET | Refills: 0 | Status: SHIPPED | OUTPATIENT
Start: 2023-10-13

## 2023-10-13 RX ORDER — CYCLOBENZAPRINE HCL 10 MG
TABLET ORAL
Qty: 40 TABLET | Refills: 1 | Status: SHIPPED | OUTPATIENT
Start: 2023-10-13 | End: 2024-01-27 | Stop reason: SDUPTHER

## 2023-11-29 ENCOUNTER — PATIENT MESSAGE (OUTPATIENT)
Dept: HEALTH INFORMATION MANAGEMENT | Facility: OTHER | Age: 78
End: 2023-11-29

## 2023-11-30 ENCOUNTER — APPOINTMENT (RX ONLY)
Dept: URBAN - METROPOLITAN AREA CLINIC 6 | Facility: CLINIC | Age: 78
Setting detail: DERMATOLOGY
End: 2023-11-30

## 2023-11-30 DIAGNOSIS — L82.1 OTHER SEBORRHEIC KERATOSIS: ICD-10-CM

## 2023-11-30 DIAGNOSIS — Z71.89 OTHER SPECIFIED COUNSELING: ICD-10-CM

## 2023-11-30 DIAGNOSIS — L81.4 OTHER MELANIN HYPERPIGMENTATION: ICD-10-CM

## 2023-11-30 DIAGNOSIS — D22 MELANOCYTIC NEVI: ICD-10-CM

## 2023-11-30 DIAGNOSIS — B35.3 TINEA PEDIS: ICD-10-CM

## 2023-11-30 DIAGNOSIS — Z85.828 PERSONAL HISTORY OF OTHER MALIGNANT NEOPLASM OF SKIN: ICD-10-CM

## 2023-11-30 DIAGNOSIS — D18.0 HEMANGIOMA: ICD-10-CM

## 2023-11-30 DIAGNOSIS — L72.8 OTHER FOLLICULAR CYSTS OF THE SKIN AND SUBCUTANEOUS TISSUE: ICD-10-CM

## 2023-11-30 PROBLEM — D18.01 HEMANGIOMA OF SKIN AND SUBCUTANEOUS TISSUE: Status: ACTIVE | Noted: 2023-11-30

## 2023-11-30 PROBLEM — D48.5 NEOPLASM OF UNCERTAIN BEHAVIOR OF SKIN: Status: ACTIVE | Noted: 2023-11-30

## 2023-11-30 PROBLEM — D22.5 MELANOCYTIC NEVI OF TRUNK: Status: ACTIVE | Noted: 2023-11-30

## 2023-11-30 PROCEDURE — ? DEFER

## 2023-11-30 PROCEDURE — ? PRESCRIPTION MEDICATION MANAGEMENT

## 2023-11-30 PROCEDURE — 11103 TANGNTL BX SKIN EA SEP/ADDL: CPT

## 2023-11-30 PROCEDURE — ? COUNSELING

## 2023-11-30 PROCEDURE — 99213 OFFICE O/P EST LOW 20 MIN: CPT | Mod: 25

## 2023-11-30 PROCEDURE — ? BIOPSY BY SHAVE METHOD

## 2023-11-30 PROCEDURE — ? SUNSCREEN TREATMENT REGIMEN

## 2023-11-30 PROCEDURE — ? SUNSCREEN RECOMMENDATIONS

## 2023-11-30 PROCEDURE — 11102 TANGNTL BX SKIN SINGLE LES: CPT

## 2023-11-30 ASSESSMENT — LOCATION SIMPLE DESCRIPTION DERM
LOCATION SIMPLE: POSTERIOR NECK
LOCATION SIMPLE: LEFT HAND
LOCATION SIMPLE: UPPER BACK
LOCATION SIMPLE: RIGHT ANKLE
LOCATION SIMPLE: ABDOMEN
LOCATION SIMPLE: LEFT FOREARM
LOCATION SIMPLE: RIGHT HAND
LOCATION SIMPLE: CHEST

## 2023-11-30 ASSESSMENT — LOCATION DETAILED DESCRIPTION DERM
LOCATION DETAILED: RIGHT ULNAR DORSAL HAND
LOCATION DETAILED: LEFT RADIAL DORSAL HAND
LOCATION DETAILED: LEFT POSTERIOR NECK
LOCATION DETAILED: PERIUMBILICAL SKIN
LOCATION DETAILED: LEFT PROXIMAL DORSAL FOREARM
LOCATION DETAILED: EPIGASTRIC SKIN
LOCATION DETAILED: STERNUM
LOCATION DETAILED: RIGHT ANKLE
LOCATION DETAILED: SUPERIOR THORACIC SPINE

## 2023-11-30 ASSESSMENT — LOCATION ZONE DERM
LOCATION ZONE: TRUNK
LOCATION ZONE: NECK
LOCATION ZONE: LEG
LOCATION ZONE: ARM
LOCATION ZONE: HAND

## 2023-11-30 NOTE — PROCEDURE: PRESCRIPTION MEDICATION MANAGEMENT
Render In Strict Bullet Format?: No
Detail Level: Zone
Continue Regimen: ketoconazole 2 % topical cream

## 2023-12-05 ENCOUNTER — TELEPHONE (OUTPATIENT)
Dept: VASCULAR LAB | Facility: MEDICAL CENTER | Age: 78
End: 2023-12-05
Payer: MEDICARE

## 2023-12-05 DIAGNOSIS — E78.5 DYSLIPIDEMIA: ICD-10-CM

## 2023-12-06 NOTE — TELEPHONE ENCOUNTER
S/w pt - he was previously seen by our clinic for lipids.  Last visit was 03/25/2022. He would like to re-establish care.  Placed lab orders for CMP and lipids.  Scheduled f/u for 12/19/23.    Ratna Reynolds, PharmD

## 2023-12-06 NOTE — TELEPHONE ENCOUNTER
----- Message from Trinidad Bhardwaj sent at 12/5/2023  2:57 PM PST -----  Patient LVM requesting a call back regarding needing to reschedule appt and questions about blood work necessary for next appt.

## 2023-12-13 ENCOUNTER — TELEPHONE (OUTPATIENT)
Dept: INTERNAL MEDICINE | Facility: IMAGING CENTER | Age: 78
End: 2023-12-13
Payer: MEDICARE

## 2023-12-13 DIAGNOSIS — Z79.899 MEDICATION MANAGEMENT: ICD-10-CM

## 2023-12-14 ENCOUNTER — NON-PROVIDER VISIT (OUTPATIENT)
Dept: INTERNAL MEDICINE | Facility: IMAGING CENTER | Age: 78
End: 2023-12-14
Payer: MEDICARE

## 2023-12-14 ENCOUNTER — HOSPITAL ENCOUNTER (OUTPATIENT)
Facility: MEDICAL CENTER | Age: 78
End: 2023-12-14
Attending: INTERNAL MEDICINE
Payer: MEDICARE

## 2023-12-14 ENCOUNTER — HOSPITAL ENCOUNTER (OUTPATIENT)
Facility: MEDICAL CENTER | Age: 78
End: 2023-12-14
Attending: FAMILY MEDICINE
Payer: MEDICARE

## 2023-12-14 DIAGNOSIS — Z79.899 MEDICATION MANAGEMENT: ICD-10-CM

## 2023-12-14 DIAGNOSIS — Z01.89 ENCOUNTER FOR ROUTINE LABORATORY TESTING: ICD-10-CM

## 2023-12-14 DIAGNOSIS — E78.5 DYSLIPIDEMIA: ICD-10-CM

## 2023-12-14 DIAGNOSIS — E11.9 TYPE 2 DIABETES MELLITUS WITHOUT COMPLICATION, WITHOUT LONG-TERM CURRENT USE OF INSULIN (HCC): ICD-10-CM

## 2023-12-14 LAB
25(OH)D3 SERPL-MCNC: 19 NG/ML (ref 30–100)
ALBUMIN SERPL BCP-MCNC: 4.8 G/DL (ref 3.2–4.9)
ALBUMIN/GLOB SERPL: 1.7 G/DL
ALP SERPL-CCNC: 86 U/L (ref 30–99)
ALT SERPL-CCNC: 36 U/L (ref 2–50)
ANION GAP SERPL CALC-SCNC: 14 MMOL/L (ref 7–16)
AST SERPL-CCNC: 15 U/L (ref 12–45)
BILIRUB SERPL-MCNC: 0.6 MG/DL (ref 0.1–1.5)
BUN SERPL-MCNC: 29 MG/DL (ref 8–22)
CALCIUM ALBUM COR SERPL-MCNC: 10.2 MG/DL (ref 8.5–10.5)
CALCIUM SERPL-MCNC: 10.8 MG/DL (ref 8.5–10.5)
CHLORIDE SERPL-SCNC: 101 MMOL/L (ref 96–112)
CHOLEST SERPL-MCNC: 143 MG/DL (ref 100–199)
CO2 SERPL-SCNC: 22 MMOL/L (ref 20–33)
CREAT SERPL-MCNC: 0.99 MG/DL (ref 0.5–1.4)
CREAT UR-MCNC: 86.31 MG/DL
ERYTHROCYTE [DISTWIDTH] IN BLOOD BY AUTOMATED COUNT: 46.7 FL (ref 35.9–50)
GFR SERPLBLD CREATININE-BSD FMLA CKD-EPI: 79 ML/MIN/1.73 M 2
GLOBULIN SER CALC-MCNC: 2.8 G/DL (ref 1.9–3.5)
GLUCOSE SERPL-MCNC: 155 MG/DL (ref 65–99)
HCT VFR BLD AUTO: 51.7 % (ref 42–52)
HDLC SERPL-MCNC: 56 MG/DL
HGB BLD-MCNC: 17.3 G/DL (ref 14–18)
LDLC SERPL CALC-MCNC: 63 MG/DL
MCH RBC QN AUTO: 30.7 PG (ref 27–33)
MCHC RBC AUTO-ENTMCNC: 33.5 G/DL (ref 32.3–36.5)
MCV RBC AUTO: 91.8 FL (ref 81.4–97.8)
MICROALBUMIN UR-MCNC: 4.2 MG/DL
MICROALBUMIN/CREAT UR: 49 MG/G (ref 0–30)
PLATELET # BLD AUTO: 227 K/UL (ref 164–446)
PMV BLD AUTO: 11.3 FL (ref 9–12.9)
POTASSIUM SERPL-SCNC: 4.3 MMOL/L (ref 3.6–5.5)
PROT SERPL-MCNC: 7.6 G/DL (ref 6–8.2)
RBC # BLD AUTO: 5.63 M/UL (ref 4.7–6.1)
SODIUM SERPL-SCNC: 137 MMOL/L (ref 135–145)
TRIGL SERPL-MCNC: 119 MG/DL (ref 0–149)
TSH SERPL DL<=0.005 MIU/L-ACNC: 1.31 UIU/ML (ref 0.38–5.33)
VIT B12 SERPL-MCNC: 476 PG/ML (ref 211–911)
WBC # BLD AUTO: 12.5 K/UL (ref 4.8–10.8)

## 2023-12-14 PROCEDURE — 84443 ASSAY THYROID STIM HORMONE: CPT

## 2023-12-14 PROCEDURE — 82306 VITAMIN D 25 HYDROXY: CPT

## 2023-12-14 PROCEDURE — 82607 VITAMIN B-12: CPT

## 2023-12-14 PROCEDURE — 80061 LIPID PANEL: CPT

## 2023-12-14 PROCEDURE — 82570 ASSAY OF URINE CREATININE: CPT

## 2023-12-14 PROCEDURE — 83036 HEMOGLOBIN GLYCOSYLATED A1C: CPT

## 2023-12-14 PROCEDURE — 85027 COMPLETE CBC AUTOMATED: CPT

## 2023-12-14 PROCEDURE — 82043 UR ALBUMIN QUANTITATIVE: CPT

## 2023-12-14 PROCEDURE — 80053 COMPREHEN METABOLIC PANEL: CPT

## 2023-12-14 NOTE — TELEPHONE ENCOUNTER
----- Message from Linnea Valenzuela R.N. sent at 12/13/2023  4:12 PM PST -----  Regarding: Annual Labs  Myke Bhatt is on my schedule for fasting labs Thursday morning.  I see that Dr Murphy has already ordered CMP & Lipids.  PSA was done in October for [facility] [place].  Thank you,  Amanda

## 2023-12-15 LAB
EST. AVERAGE GLUCOSE BLD GHB EST-MCNC: 134 MG/DL
HBA1C MFR BLD: 6.3 % (ref 4–5.6)

## 2023-12-17 DIAGNOSIS — E11.9 TYPE 2 DIABETES MELLITUS WITHOUT COMPLICATION, WITHOUT LONG-TERM CURRENT USE OF INSULIN (HCC): ICD-10-CM

## 2023-12-18 RX ORDER — METFORMIN HYDROCHLORIDE 500 MG/1
1000 TABLET, EXTENDED RELEASE ORAL DAILY
Qty: 180 TABLET | Refills: 0 | Status: SHIPPED | OUTPATIENT
Start: 2023-12-18 | End: 2024-03-18

## 2023-12-19 ENCOUNTER — NON-PROVIDER VISIT (OUTPATIENT)
Dept: VASCULAR LAB | Facility: MEDICAL CENTER | Age: 78
End: 2023-12-19
Attending: INTERNAL MEDICINE
Payer: MEDICARE

## 2023-12-19 VITALS — SYSTOLIC BLOOD PRESSURE: 123 MMHG | DIASTOLIC BLOOD PRESSURE: 72 MMHG | HEART RATE: 85 BPM

## 2023-12-19 PROCEDURE — 99212 OFFICE O/P EST SF 10 MIN: CPT

## 2023-12-19 NOTE — PROGRESS NOTES
[facility] - FollowUp Visit  Date of Service: 12/19/23    Myke Steele is here for follow up of dyslipidemia.    Subjective    HPI  Pertinent Interval History since last visit:   Pt was last seen in March 2022. Since then, he's had shoulder replacement surgery  Current Prescription Lipid Lowering Medications - including dose:   Statin: None  Non-Statin: Praluent 150mg q 14 days  Current Lipid Lowering and Related Supplements:   None  Any Current Side Effects Potentially Related to Lipid Lowering therapy?   No  Current Adherence to Lipid Lowering Therapies:  Complete  Any Previous History of Statin Intolerance?   Yes, Details: Patient trialed 3 different statins previously and had associated muscle pain and weakness with each one. Patient has increased CPK levels at baseline due to an unknown cause.   Baseline Lipids Prior to Treatment:  Shown here:  LDL-C: 122       nonHDL-C: 175           HDL-C: 47       Trigs: 266        Date: 9/9/2020     SOCIAL HISTORY  Social History     Tobacco Use   Smoking Status Never   Smokeless Tobacco Never      Change in weight:  Lost 11 lbs since last visit  Exercise habits:  walking 6500 steps daily; does leg lifts at the gym; uses exercise ball for stretching    Diet: vegetables and beans, chicken, fish and beef; well-balanced; smaller portions since last year; avoids deep fried foods; minimizes salt intake      Objective    Vitals:    12/19/23 0901   BP: 123/72   Pulse: 85        DATA REVIEW  Most Recent Lipid Panel:   Lab Results   Component Value Date    CHOLSTRLTOT 143 12/14/2023    TRIGLYCERIDE 119 12/14/2023    HDL 56 12/14/2023    LDL 63 12/14/2023       Other Pertinent Blood Work:   Lab Results   Component Value Date    SODIUM 137 12/14/2023    POTASSIUM 4.3 12/14/2023    CHLORIDE 101 12/14/2023    CO2 22 12/14/2023    ANION 14.0 12/14/2023    GLUCOSE 155 (H) 12/14/2023    BUN 29 (H) 12/14/2023    CREATININE 0.99 12/14/2023    CALCIUM 10.8 (H) 12/14/2023     ASTSGOT 15 12/14/2023    ALTSGPT 36 12/14/2023    ALKPHOSPHAT 86 12/14/2023    TBILIRUBIN 0.6 12/14/2023    ALBUMIN 4.8 12/14/2023    AGRATIO 1.7 12/14/2023    TSHULTRASEN 1.310 12/14/2023       Other:  NA    Recent Imaging Studies:    None since last visit          ASSESSMENT AND PLAN  Patient Type, check all that apply:   Secondary Prevention  Established Atherosclerotic Cardiovascular Disease (ASCVD)  Yes, Details: CAD s/p stent in 2012  Other Established (non-atherosclerotic) Vascular Disease, if Present:    HTN, T2DM  Evidence of Heterozygous Familial Hypercholesterolemia (FH): No  ACC/AHA Indication for Statin Therapy, crystal all that apply:   Established ASCVD: Indication for High intensity statin    Calculated Risk for ASCVD, if applicable:  N/A  Other Significant Risk Markers, if any, crystal all that apply:  None  National Lipid Association (NLA) Goal (if applicable):  LDL-C:   <70 mg/dL  Lifestyle Recommendations From Today’s Visit:   Continue current diet  Exercise: increase as tolerated  Statin Recommendations from Today's Visit  None - pt unable to tolerate  Non-Statin Medications Recommendations from Today’s Visit:   Continue Praluent 150mg q 14 days  Indication for PCSK9 Inhibitor, if applicable:  ASCVD with suboptimal control of LDL-C despite maximally tolerated statin  Supplements Recommended at this visit:  None  Recommendations for Other Cardiovascular Risk Factors, crystal all that apply:   Hypertension- BP at goal today and Diabetes/Impaired Fasting Glucose- A1c at goal  Other Issues:  None - continue present management    Studies Ordered at Todays Visit:  None   Blood Work Ordered At Today’s visit:   None since cardiology may order CMP/lipid panel  Follow-Up:   Pt would like to f/u annually prior to cardiology appt w/ Dr Olvera, he will call us to schedule an appt    Ratna Reynolds, PharmD    CC:  Keira Slade M.D.  [place], Keira CONTRERAS M.D.

## 2024-01-23 ENCOUNTER — APPOINTMENT (RX ONLY)
Dept: URBAN - METROPOLITAN AREA CLINIC 36 | Facility: CLINIC | Age: 79
Setting detail: DERMATOLOGY
End: 2024-01-23

## 2024-01-23 PROBLEM — C44.311 BASAL CELL CARCINOMA OF SKIN OF NOSE: Status: ACTIVE | Noted: 2024-01-23

## 2024-01-23 PROCEDURE — ? MOHS SURGERY

## 2024-01-23 PROCEDURE — 14060 TIS TRNFR E/N/E/L 10 SQ CM/<: CPT

## 2024-01-23 PROCEDURE — 17311 MOHS 1 STAGE H/N/HF/G: CPT

## 2024-01-23 PROCEDURE — 17312 MOHS ADDL STAGE: CPT

## 2024-01-23 NOTE — PROCEDURE: MOHS SURGERY
Body Location Override (Optional - Billing Will Still Be Based On Selected Body Map Location If Applicable): right nasal dorsum
Mohs Case Number: [id number]
Previous Accession (Optional): [id number]
Biopsy Photograph Reviewed: Yes
Referring Physician (Optional): dhara
Consent Type: Consent 1 (Standard)
Eye Shield Used: No
Surgeon Performing Repair (Optional): Justice
Initial Size Of Lesion: 0.6
X Size Of Lesion In Cm (Optional): 0.4
Number Of Stages: 2
Primary Defect Length In Cm (Final Defect Size - Required For Flaps/Grafts): 1
Primary Defect Width In Cm (Final Defect Size - Required For Flaps/Grafts): 0.8
Repair Type: Flap
Which Eyelid Repair Cpt Are You Using?: 12811
Oculoplastic Surgeon (A): Ahsan
Oculoplastic Surgeon Procedure Text (A): After obtaining clear surgical margins the patient was sent to oculoplastics for surgical repair.  The patient understands they will receive post-surgical care and follow-up from the referring physician's office.
Otolaryngologist Procedure Text (A): After obtaining clear surgical margins the patient was sent to otolaryngology for surgical repair.  The patient understands they will receive post-surgical care and follow-up from the referring physician's office.
Plastic Surgeon Procedure Text (A): After obtaining clear surgical margins the patient was sent to plastics for surgical repair.  The patient understands they will receive post-surgical care and follow-up from the referring physician's office.
Mid-Level (A): did
Mid-Level Procedure Text (A): After obtaining clear surgical margins the patient was sent to a mid-level provider for surgical repair.  The patient understands they will receive post-surgical care and follow-up from the mid-level provider.
Provider Procedure Text (A): After obtaining clear surgical margins the defect was repaired by another provider.
Asc Procedure Text (A): After obtaining clear surgical margins the patient was sent to an ASC for surgical repair.  The patient understands they will receive post-surgical care and follow-up from the ASC physician.
Simple / Intermediate / Complex Repair - Final Wound Length In Cm: 0
Suturegard Retention Suture: 2-0 Nylon
Retention Suture Bite Size: 3 mm
Length To Time In Minutes Device Was In Place: 10
Undermining Type: Entire Wound
Debridement Text: The wound edges were debrided prior to proceeding with the closure to facilitate wound healing.
Helical Rim Text: The closure involved the helical rim.
Vermilion Border Text: The closure involved the vermilion border.
Nostril Rim Text: The closure involved the nostril rim.
Retention Suture Text: Retention sutures were placed to support the closure and prevent dehiscence.
Flap Type: Rhombic Flap
Secondary Defect Length In Cm (Required For Flaps): 1.8
Secondary Defect Width In Cm (Required For Flaps): 1.7
Area H Indication Text: Tumors in this location are included in Area H (eyelids, eyebrows, nose, lips, chin, ear, pre-auricular, post-auricular, temple, genitalia, hands, feet, ankles and areola).  Tissue conservation is critical in these anatomic locations.
Area M Indication Text: Tumors in this location are included in Area M (cheek, forehead, scalp, neck, jawline and pretibial skin).  Mohs surgery is indicated for tumors in these anatomic locations.
Area L Indication Text: Tumors in this location are included in Area L (trunk and extremities).  Mohs surgery is indicated for larger tumors, or tumors with aggressive histologic features, in these anatomic locations.
Depth Of Tumor Invasion (For Histology): deep fat
Perineural Invasion (For Histology - Be Specific If Possible): absent
Special Stains Stage 1 - Results: Base On Clearance Noted Above
Stage 2: Additional Anesthesia Type: 1% lidocaine with 1:100,000 epinephrine and 408mcg clindamycin/ml and a 1:10 solution of 8.4% sodium bicarbonate
Surgical Defect Length In Cm (Optional): 1.0
Stage 4: Additional Anesthesia Type: 1% lidocaine with epinephrine
Staging Info: By selecting yes to the question above you will include information on AJCC 8 tumor staging in your Mohs note. Information on tumor staging will be automatically added for SCCs on the head and neck. AJCC 8 includes tumor size, tumor depth, perineural involvement and bone invasion.
Tumor Depth: Less than 6mm from granular layer and no invasion beyond the subcutaneous fat
Was The Patient On Physician Recommended Anticoagulation Therapy?: Please Select the Appropriate Response
Medical Necessity Statement: Based on my medical judgement, Mohs surgery is the most appropriate treatment for this cancer compared to other treatments.
Alternatives Discussed Intro (Do Not Add Period): I discussed alternative treatments to Mohs surgery and specifically discussed the risks and benefits of
Consent 1/Introductory Paragraph: The rationale for Mohs was explained to the patient and consent was obtained. The risks, benefits and alternatives to therapy were discussed in detail. Specifically, the risks of infection, scarring, bleeding, prolonged wound healing, incomplete removal, allergy to anesthesia, nerve injury and recurrence were addressed. Prior to the procedure, the treatment site was clearly identified and confirmed by the patient. All components of Universal Protocol/PAUSE Rule completed.
Consent 2/Introductory Paragraph: Mohs surgery was explained to the patient and consent was obtained. The risks, benefits and alternatives to therapy were discussed in detail. Specifically, the risks of infection, scarring, bleeding, prolonged wound healing, incomplete removal, allergy to anesthesia, nerve injury and recurrence were addressed. Prior to the procedure, the treatment site was clearly identified and confirmed by the patient. All components of Universal Protocol/PAUSE Rule completed.
Consent 3/Introductory Paragraph: I gave the patient a chance to ask questions they had about the procedure.  Following this I explained the Mohs procedure and consent was obtained. The risks, benefits and alternatives to therapy were discussed in detail. Specifically, the risks of infection, scarring, bleeding, prolonged wound healing, incomplete removal, allergy to anesthesia, nerve injury and recurrence were addressed. Prior to the procedure, the treatment site was clearly identified and confirmed by the patient. All components of Universal Protocol/PAUSE Rule completed.
Consent (Temporal Branch)/Introductory Paragraph: The rationale for Mohs was explained to the patient and consent was obtained. The risks, benefits and alternatives to therapy were discussed in detail. Specifically, the risks of damage to the temporal branch of the facial nerve, infection, scarring, bleeding, prolonged wound healing, incomplete removal, allergy to anesthesia, and recurrence were addressed. Prior to the procedure, the treatment site was clearly identified and confirmed by the patient. All components of Universal Protocol/PAUSE Rule completed.
Consent (Marginal Mandibular)/Introductory Paragraph: The rationale for Mohs was explained to the patient and consent was obtained. The risks, benefits and alternatives to therapy were discussed in detail. Specifically, the risks of damage to the marginal mandibular branch of the facial nerve, infection, scarring, bleeding, prolonged wound healing, incomplete removal, allergy to anesthesia, and recurrence were addressed. Prior to the procedure, the treatment site was clearly identified and confirmed by the patient. All components of Universal Protocol/PAUSE Rule completed.
Consent (Spinal Accessory)/Introductory Paragraph: The rationale for Mohs was explained to the patient and consent was obtained. The risks, benefits and alternatives to therapy were discussed in detail. Specifically, the risks of damage to the spinal accessory nerve, infection, scarring, bleeding, prolonged wound healing, incomplete removal, allergy to anesthesia, and recurrence were addressed. Prior to the procedure, the treatment site was clearly identified and confirmed by the patient. All components of Universal Protocol/PAUSE Rule completed.
Consent (Near Eyelid Margin)/Introductory Paragraph: The rationale for Mohs was explained to the patient and consent was obtained. The risks, benefits and alternatives to therapy were discussed in detail. Specifically, the risks of ectropion or eyelid deformity, infection, scarring, bleeding, prolonged wound healing, incomplete removal, allergy to anesthesia, nerve injury and recurrence were addressed. Prior to the procedure, the treatment site was clearly identified and confirmed by the patient. All components of Universal Protocol/PAUSE Rule completed.
Consent (Ear)/Introductory Paragraph: The rationale for Mohs was explained to the patient and consent was obtained. The risks, benefits and alternatives to therapy were discussed in detail. Specifically, the risks of ear deformity, infection, scarring, bleeding, prolonged wound healing, incomplete removal, allergy to anesthesia, nerve injury and recurrence were addressed. Prior to the procedure, the treatment site was clearly identified and confirmed by the patient. All components of Universal Protocol/PAUSE Rule completed.
Consent (Nose)/Introductory Paragraph: The rationale for Mohs was explained to the patient and consent was obtained. The risks, benefits and alternatives to therapy were discussed in detail. Specifically, the risks of nasal deformity, changes in the flow of air through the nose, infection, scarring, bleeding, prolonged wound healing, incomplete removal, allergy to anesthesia, nerve injury and recurrence were addressed. Prior to the procedure, the treatment site was clearly identified and confirmed by the patient. All components of Universal Protocol/PAUSE Rule completed.
Consent (Lip)/Introductory Paragraph: The rationale for Mohs was explained to the patient and consent was obtained. The risks, benefits and alternatives to therapy were discussed in detail. Specifically, the risks of lip deformity, changes in the oral aperture, infection, scarring, bleeding, prolonged wound healing, incomplete removal, allergy to anesthesia, nerve injury and recurrence were addressed. Prior to the procedure, the treatment site was clearly identified and confirmed by the patient. All components of Universal Protocol/PAUSE Rule completed.
Consent (Scalp)/Introductory Paragraph: The rationale for Mohs was explained to the patient and consent was obtained. The risks, benefits and alternatives to therapy were discussed in detail. Specifically, the risks of changes in hair growth pattern secondary to repair, infection, scarring, bleeding, prolonged wound healing, incomplete removal, allergy to anesthesia, nerve injury and recurrence were addressed. Prior to the procedure, the treatment site was clearly identified and confirmed by the patient. All components of Universal Protocol/PAUSE Rule completed.
Detail Level: Detailed
Postop Diagnosis: same
Anesthesia Type: 1% lidocaine with 1:100,000 epinephrine and a 1:10 solution of 8.4% sodium bicarbonate
Anesthesia Volume In Cc: 6
Hemostasis: Electrocautery
Estimated Blood Loss (Cc): less than 5 cc
Repair Anesthesia Method: local infiltration
Brow Lift Text: A midfrontal incision was made medially to the defect to allow access to the tissues just superior to the left eyebrow. Following careful dissection inferiorly in a supraperiosteal plane to the level of the left eyebrow, several 3-0 monocryl sutures were used to resuspend the eyebrow orbicularis oculi muscular unit to the superior frontal bone periosteum. This resulted in an appropriate reapproximation of static eyebrow symmetry and correction of the left brow ptosis.
Deep Sutures: 5-0 Polysorb
Epidermal Sutures: 5-0 Prolene
Epidermal Closure: running cuticular
Suturegard Intro: Intraoperative tissue expansion was performed, utilizing the SUTUREGARD device, in order to reduce wound tension.
Suturegard Body: The suture ends were repeatedly re-tightened and re-clamped to achieve the desired tissue expansion.
Hemigard Intro: Due to skin fragility and wound tension, it was decided to use HEMIGARD adhesive retention suture devices to permit a linear closure. The skin was cleaned and dried for a 6cm distance away from the wound. Excessive hair, if present, was removed to allow for adhesion.
Hemigard Postcare Instructions: The HEMIGARD strips are to remain completely dry for at least 5-7 days.
Donor Site Anesthesia Type: same as repair anesthesia
Graft Basting Suture (Optional): 5-0 Fast Absorbing Gut
Graft Donor Site Epidermal Sutures (Optional): 5-0 Ethibond
Epidermal Closure Graft Donor Site (Optional): simple interrupted
Graft Donor Site Bandage (Optional-Leave Blank If You Don't Want In Note): Aquaphor and telefa placed on wound. Pressure dressing applied to donor site
Closure 2 Information: This tab is for additional flaps and grafts, including complex repair and grafts and complex repair and flaps. You can also specify a different location for the additional defect, if the location is the same you do not need to select a new one. We will insert the automated text for the repair you select below just as we do for solitary flaps and grafts. Please note that at this time if you select a location with a different insurance zone you will need to override the ICD10 and CPT if appropriate.
Closure 3 Information: This tab is for additional flaps and grafts above and beyond our usual structured repairs.  Please note if you enter information here it will not currently bill and you will need to add the billing information manually.
Wound Care: Aquaphor
Dressing: dry sterile dressing
Wound Care (No Sutures): Petrolatum
Suture Removal: 7 days
Unna Boot Text: An Unna boot was placed to help immobilize the limb and facilitate more rapid healing.
Home Suture Removal Text: Patient was provided instructions on removing sutures and will remove their sutures at home.  If they have any questions or difficulties they will call the office.
Post-Care Instructions: I reviewed with the patient in detail post-care instructions. Patient is not to engage in any heavy lifting, exercise, or swimming for the next 14 days. Should the patient develop any fevers, chills, bleeding, severe pain patient will contact the office immediately.
Pain Refusal Text: I offered to prescribe pain medication but the patient refused to take this medication.
Mauc Instructions: By selecting yes to the question below the MAUC number will be added into the note.  This will be calculated automatically based on the diagnosis chosen, the size entered, the body zone selected (H,M,L) and the specific indications you chose. You will also have the option to override the Mohs AUC if you disagree with the automatically calculated number and this option is found in the Case Summary tab.
Where Do You Want The Question To Include Opioid Counseling Located?: Case Summary Tab
Eye Protection Verbiage: Before proceeding with the stage, a plastic scleral shield was inserted. The globe was anesthetized with a few drops of 1% lidocaine with 1:100,000 epinephrine. Then, an appropriate sized scleral shield was chosen and coated with lacrilube ointment. The shield was gently inserted and left in place for the duration of each stage. After the stage was completed, the shield was gently removed.
Mohs Method Verbiage: An incision at a 45 degree angle following the standard Mohs approach was done and the specimen was harvested as a microscopic controlled layer.
Surgeon/Pathologist Verbiage (Will Incorporate Name Of Surgeon From Intro If Not Blank): operated in two distinct and integrated capacities as the surgeon and pathologist.
Mohs Histo Method Verbiage: Each section was then chromacoded and processed in the Mohs lab using the Mohs protocol and submitted for frozen section.
Subsequent Stages Histo Method Verbiage: Using a similar technique to that described above, a thin layer of tissue was removed from all areas where tumor was visible on the previous stage.  The tissue was again oriented, mapped, dyed, and processed as above.
Mohs Rapid Report Verbiage: The area of clinically evident tumor was marked with skin marking ink and appropriately hatched.  The initial incision was made following the Mohs approach through the skin.  The specimen was taken to the lab, divided into the necessary number of pieces, chromacoded and processed according to the Mohs protocol.  This was repeated in successive stages until a tumor free defect was achieved.
Complex Repair Preamble Text (Leave Blank If You Do Not Want): Extensive wide undermining was performed at least 2 cm in all directions.
Intermediate Repair Preamble Text (Leave Blank If You Do Not Want): Undermining was performed with blunt dissection.
M-Plasty Complex Repair Preamble Text (Leave Blank If You Do Not Want): Extensive wide undermining was performed.
Non-Graft Cartilage Fenestration Text: The cartilage was fenestrated with a 2mm punch biopsy to help facilitate healing.
Graft Cartilage Fenestration Text: The cartilage was fenestrated with a 2mm punch biopsy to help facilitate graft survival and healing.
Secondary Intention Text (Leave Blank If You Do Not Want): The defect will heal with secondary intention.
No Repair - Repaired With Adjacent Surgical Defect Text (Leave Blank If You Do Not Want): After obtaining clear surgical margins the defect was repaired concurrently with another surgical defect which was in close approximation.
Unique Flap 1 Name: Myocutaneous Island pedicle Flap
Unique Flap 2 Name: Peng Flap
Unique Flap 3 Name: Mercedes Flap
Unique Flap 4 Name: Banner Flap
Unique Flap 5 Name: tunneled myocutaneous flap
Unique Flap 6 Name: Marcelina-B?ch flap
Unique Flap 7 Name: Mustarde flap
Unique Flap 8 Name: East to West Flap
Unique Flap 1 Text: A decision was made to reconstruct the defect utilizing a myocutaneous Island pedicle Flap based on the levator labii superioris muscle.  A telfa template was made of the defect.  This telfa template was then used to outline the myocutaneous flap, based along the meilolabial fold.  The donor area for the pedicle flap was then injected with anesthesia.  The flap was excised through the skin and subcutaneous tissue down to the layer of the underlying musculature.  The myocutaneous flap was carefully excised within this deep plane to maintain its blood supply. Based on the muscle. The edges of the donor site were undermined.   The donor site was closed in a primary fashion to the point of transposition.  The pedicle was then transposed into position and sutured.  Once the flap was sutured into place, adequate blood supply was confirmed with blanching and refill.
Unique Flap 2 Text: A decision was made to reconstruct the defect utilizing a Peng Flap (Bilateral Advancement Rotation Flap). Given the location of the defect and the proximity to free margins, this flap was deemed most appropriate.  Using a sterile surgical marker, the appropriate rotation flaps were drawn incorporating the defect and placing the expected incisions within the relaxed skin tension lines where possible.    The area thus outlined was incised deep to adipose tissue with a #15 scalpel blade.  The skin margins were undermined to an appropriate distance in all directions utilizing iris scissors.
Unique Flap 3 Text: The defect edges were debeveled with a #15 scalpel blade.  Given the location of the defect, shape of the defect and the proximity to free margins a Mercedes (double advancement flap) was deemed most appropriate.  Using a sterile surgical marker, the appropriate transposition flaps were drawn incorporating the defect and placing the expected incisions within the relaxed skin tension lines where possible.    The area thus outlined was incised deep to adipose tissue with a #15 scalpel blade.  The skin margins were undermined to an appropriate distance in all directions utilizing iris scissors.  Hemostasis was achieved with electrocautery.  The flaps were then advanced into the defect and anchored with interrupted buried subcutaneous sutures.
Unique Flap 4 Text: The defect edges were debeveled with a #15 scalpel blade.  Given the location of the defect and the proximity to free margins a Banner transposition flap was deemed most appropriate.  Using a sterile surgical marker, an appropriate Banner transposition flap was drawn incorporating the defect.    The area thus outlined was incised deep to adipose tissue with a #15 scalpel blade.  The skin margins were undermined to an appropriate distance in all directions utilizing iris scissors.
Unique Flap 5 Text: A decision was made to reconstruct the defect utilizing a tunneled myocutaneous Island pedicle Flap based on the anterior auricularis muscle.  A telfa template was made of the defect.  This telfa template was then used to outline the myocutaneous flap, based along the preauricular fold.  The donor area for the pedicle flap was then injected with anesthesia.  The flap was excised through the skin and subcutaneous tissue down to the layer of the underlying musculature.  The myocutaneous flap was carefully excised within this deep plane to maintain its blood supply based on the muscle. The edges of the donor site were undermined.   The donor site was closed in a primary fashion to the point of transposition.  The pedicle was then transposed through a tunnel into position and sutured.  Once the flap was sutured into place, adequate blood supply was confirmed with blanching and refill.
Unique Flap 6 Text: A decision was made to reconstruct the defect utilizing an Anti-aging-B?ch Flap (Bilateral helical Advancement Rotation Flap). Given the location of the defect and the proximity to free margins, this flap was deemed most appropriate.  Using a sterile surgical marker, the appropriate flaps were drawn incorporating the defect and placing the expected incisions within the relaxed skin tension lines where possible.  The area thus outlined was incised deep to adipose tissue with a #15 scalpel blade.  The skin margins were undermined to an appropriate distance in all directions utilizing iris scissors. Cartilage was incorporated into the flap arms to maintain helical anatomy.
Unique Flap 7 Text: A decision was made to reconstruct the defect utilizing a Mustarde Flap (Advancement Rotation Flap). Given the location of the defect and the proximity to free margins, this flap was deemed most appropriate.  Using a sterile surgical marker, the appropriate rotation flap was drawn incorporating the defect and placing the expected incisions within the relaxed skin tension lines where possible.    The area thus outlined was incised deep to adipose tissue with a #15 scalpel blade.  The skin margins were undermined to an appropriate distance in all directions utilizing iris scissors. The flap was advanced and rotated under the eyelid with a sling created laterally to keep ectropion minimal.
Unique Flap 8 Text: A decision was made to reconstruct the defect utilizing an East to West Flap (Modified Burows Advancement Flap). Given the location of the defect and the proximity to free margins, this flap was deemed most appropriate.  Using a sterile surgical marker, the appropriate advancement flaps were drawn incorporating the defect and placing the expected incisions within the relaxed skin tension lines where possible.    The area thus outlined was incised deep to adipose tissue with a #15 scalpel blade.  The skin margins were undermined to an appropriate distance in all directions utilizing iris scissors. Minimal alar distortion was created with flap approximation.
Adjacent Tissue Transfer Text: The defect edges were debeveled with a #15 scalpel blade.  Given the location of the defect and the proximity to free margins an adjacent tissue transfer was deemed most appropriate.  Using a sterile surgical marker, an appropriate flap was drawn incorporating the defect and placing the expected incisions within the relaxed skin tension lines where possible.    The area thus outlined was incised deep to adipose tissue with a #15 scalpel blade.  The skin margins were undermined to an appropriate distance in all directions utilizing iris scissors.
Advancement Flap (Single) Text: The defect edges were debeveled with a #15 scalpel blade.  Given the location of the defect and the proximity to free margins a single advancement flap was deemed most appropriate.  Using a sterile surgical marker, an appropriate advancement flap was drawn incorporating the defect and placing the expected incisions within the relaxed skin tension lines where possible.    The area thus outlined was incised deep to adipose tissue with a #15 scalpel blade.  The skin margins were undermined to an appropriate distance in all directions utilizing iris scissors.
Advancement Flap (Double) Text: The defect edges were debeveled with a #15 scalpel blade.  Given the location of the defect and the proximity to free margins a double advancement flap was deemed most appropriate.  Using a sterile surgical marker, the appropriate advancement flaps were drawn incorporating the defect and placing the expected incisions within the relaxed skin tension lines where possible.    The area thus outlined was incised deep to adipose tissue with a #15 scalpel blade.  The skin margins were undermined to an appropriate distance in all directions utilizing iris scissors.
Burow's Advancement Flap Text: The defect edges were debeveled with a #15 scalpel blade.  Given the location of the defect and the proximity to free margins a Burow's advancement flap was deemed most appropriate.  Using a sterile surgical marker, the appropriate advancement flap was drawn incorporating the defect and placing the expected incisions within the relaxed skin tension lines where possible.    The area thus outlined was incised deep to adipose tissue with a #15 scalpel blade.  The skin margins were undermined to an appropriate distance in all directions utilizing iris scissors.
Chonodrocutaneous Helical Advancement Flap Text: The defect edges were debeveled with a #15 scalpel blade.  Given the location of the defect and the proximity to free margins a chondrocutaneous helical advancement flap was deemed most appropriate.  Using a sterile surgical marker, the appropriate advancement flap was drawn incorporating the defect and placing the expected incisions within the relaxed skin tension lines where possible.    The area thus outlined was incised deep to adipose tissue with a #15 scalpel blade.  The skin margins were undermined to an appropriate distance in all directions utilizing iris scissors.
Crescentic Advancement Flap Text: The defect edges were debeveled with a #15 scalpel blade.  Given the location of the defect and the proximity to free margins a crescentic advancement flap was deemed most appropriate.  Using a sterile surgical marker, the appropriate advancement flap was drawn incorporating the defect and placing the expected incisions within the relaxed skin tension lines where possible.    The area thus outlined was incised deep to adipose tissue with a #15 scalpel blade.  The skin margins were undermined to an appropriate distance in all directions utilizing iris scissors.
A-T Advancement Flap Text: The defect edges were debeveled with a #15 scalpel blade.  Given the location of the defect, shape of the defect and the proximity to free margins an A-T advancement flap was deemed most appropriate.  Using a sterile surgical marker, an appropriate advancement flap was drawn incorporating the defect and placing the expected incisions within the relaxed skin tension lines where possible.    The area thus outlined was incised deep to adipose tissue with a #15 scalpel blade.  The skin margins were undermined to an appropriate distance in all directions utilizing iris scissors.
O-T Advancement Flap Text: The defect edges were debeveled with a #15 scalpel blade.  Given the location of the defect, shape of the defect and the proximity to free margins an O-T advancement flap was deemed most appropriate.  Using a sterile surgical marker, an appropriate advancement flap was drawn incorporating the defect and placing the expected incisions within the relaxed skin tension lines where possible.    The area thus outlined was incised deep to adipose tissue with a #15 scalpel blade.  The skin margins were undermined to an appropriate distance in all directions utilizing iris scissors.
O-L Flap Text: The defect edges were debeveled with a #15 scalpel blade.  Given the location of the defect, shape of the defect and the proximity to free margins an O-L flap was deemed most appropriate.  Using a sterile surgical marker, an appropriate advancement flap was drawn incorporating the defect and placing the expected incisions within the relaxed skin tension lines where possible.    The area thus outlined was incised deep to adipose tissue with a #15 scalpel blade.  The skin margins were undermined to an appropriate distance in all directions utilizing iris scissors.
O-Z Flap Text: The defect edges were debeveled with a #15 scalpel blade.  Given the location of the defect, shape of the defect and the proximity to free margins an O-Z flap was deemed most appropriate.  Using a sterile surgical marker, an appropriate transposition flap was drawn incorporating the defect and placing the expected incisions within the relaxed skin tension lines where possible. The area thus outlined was incised deep to adipose tissue with a #15 scalpel blade.  The skin margins were undermined to an appropriate distance in all directions utilizing iris scissors.
Double O-Z Flap Text: The defect edges were debeveled with a #15 scalpel blade.  Given the location of the defect, shape of the defect and the proximity to free margins a Double O-Z flap was deemed most appropriate.  Using a sterile surgical marker, an appropriate transposition flap was drawn incorporating the defect and placing the expected incisions within the relaxed skin tension lines where possible. The area thus outlined was incised deep to adipose tissue with a #15 scalpel blade.  The skin margins were undermined to an appropriate distance in all directions utilizing iris scissors.
V-Y Flap Text: The defect edges were debeveled with a #15 scalpel blade.  Given the location of the defect, shape of the defect and the proximity to free margins a V-Y flap was deemed most appropriate.  Using a sterile surgical marker, an appropriate advancement flap was drawn incorporating the defect and placing the expected incisions within the relaxed skin tension lines where possible.    The area thus outlined was incised deep to adipose tissue with a #15 scalpel blade.  The skin margins were undermined to an appropriate distance in all directions utilizing iris scissors.
Advancement-Rotation Flap Text: The defect edges were debeveled with a #15 scalpel blade.  Given the location of the defect, shape of the defect and the proximity to free margins an advancement-rotation flap was deemed most appropriate.  Using a sterile surgical marker, an appropriate flap was drawn incorporating the defect and placing the expected incisions within the relaxed skin tension lines where possible. The area thus outlined was incised deep to adipose tissue with a #15 scalpel blade.  The skin margins were undermined to an appropriate distance in all directions utilizing iris scissors.
Mercedes Flap Text: The defect edges were debeveled with a #15 scalpel blade.  Given the location of the defect, shape of the defect and the proximity to free margins a Mercedes flap was deemed most appropriate.  Using a sterile surgical marker, an appropriate advancement flap was drawn incorporating the defect and placing the expected incisions within the relaxed skin tension lines where possible. The area thus outlined was incised deep to adipose tissue with a #15 scalpel blade.  The skin margins were undermined to an appropriate distance in all directions utilizing iris scissors.
Modified Advancement Flap Text: The defect edges were debeveled with a #15 scalpel blade.  Given the location of the defect, shape of the defect and the proximity to free margins a modified advancement flap was deemed most appropriate.  Using a sterile surgical marker, an appropriate advancement flap was drawn incorporating the defect and placing the expected incisions within the relaxed skin tension lines where possible.    The area thus outlined was incised deep to adipose tissue with a #15 scalpel blade.  The skin margins were undermined to an appropriate distance in all directions utilizing iris scissors.
Mucosal Advancement Flap Text: Given the location of the defect, shape of the defect and the proximity to free margins a mucosal advancement flap was deemed most appropriate. Incisions were made with a 15 blade scalpel in the appropriate fashion along the cutaneous vermilion border and the mucosal lip. The remaining actinically damaged mucosal tissue was excised.  The mucosal advancement flap was then elevated to the gingival sulcus with care taken to preserve the neurovascular structures and advanced into the primary defect. Care was taken to ensure that precise realignment of the vermilion border was achieved.
Peng Advancement Flap Text: The defect edges were debeveled with a #15 scalpel blade.  Given the location of the defect, shape of the defect and the proximity to free margins a Peng advancement flap was deemed most appropriate.  Using a sterile surgical marker, an appropriate advancement flap was drawn incorporating the defect and placing the expected incisions within the relaxed skin tension lines where possible. The area thus outlined was incised deep to adipose tissue with a #15 scalpel blade.  The skin margins were undermined to an appropriate distance in all directions utilizing iris scissors.
Hatchet Flap Text: The defect edges were debeveled with a #15 scalpel blade.  Given the location of the defect, shape of the defect and the proximity to free margins a hatchet flap based from the glabella was deemed most appropriate.  Using a sterile surgical marker, an appropriate glabellar hatchet flap was drawn incorporating the defect and placing the expected incisions within the relaxed skin tension lines where possible.    The area thus outlined was incised deep to adipose tissue with a #15 scalpel blade.  The skin margins were undermined to an appropriate distance in all directions utilizing iris scissors.
Rotation Flap Text: The defect edges were debeveled with a #15 scalpel blade.  Given the location of the defect, shape of the defect and the proximity to free margins a rotation flap was deemed most appropriate.  Using a sterile surgical marker, an appropriate rotation flap was drawn incorporating the defect and placing the expected incisions within the relaxed skin tension lines where possible.    The area thus outlined was incised deep to adipose tissue with a #15 scalpel blade.  The skin margins were undermined to an appropriate distance in all directions utilizing iris scissors.
Bilateral Rotation Flap Text: The defect edges were debeveled with a #15 scalpel blade. Given the location of the defect, shape of the defect and the proximity to free margins a bilateral rotation flap was deemed most appropriate. Using a sterile surgical marker, an appropriate rotation flap was drawn incorporating the defect and placing the expected incisions within the relaxed skin tension lines where possible. The area thus outlined was incised deep to adipose tissue with a #15 scalpel blade. The skin margins were undermined to an appropriate distance in all directions utilizing iris scissors. Following this, the designed flap was carried over into the primary defect and sutured into place.
Spiral Flap Text: The defect edges were debeveled with a #15 scalpel blade.  Given the location of the defect, shape of the defect and the proximity to free margins a spiral flap was deemed most appropriate.  Using a sterile surgical marker, an appropriate rotation flap was drawn incorporating the defect and placing the expected incisions within the relaxed skin tension lines where possible. The area thus outlined was incised deep to adipose tissue with a #15 scalpel blade.  The skin margins were undermined to an appropriate distance in all directions utilizing iris scissors.
Staged Advancement Flap Text: The defect edges were debeveled with a #15 scalpel blade.  Given the location of the defect, shape of the defect and the proximity to free margins a staged advancement flap was deemed most appropriate.  Using a sterile surgical marker, an appropriate advancement flap was drawn incorporating the defect and placing the expected incisions within the relaxed skin tension lines where possible. The area thus outlined was incised deep to adipose tissue with a #15 scalpel blade.  The skin margins were undermined to an appropriate distance in all directions utilizing iris scissors.
Star Wedge Flap Text: The defect edges were debeveled with a #15 scalpel blade.  Given the location of the defect, shape of the defect and the proximity to free margins a star wedge flap was deemed most appropriate.  Using a sterile surgical marker, an appropriate rotation flap was drawn incorporating the defect and placing the expected incisions within the relaxed skin tension lines where possible. The area thus outlined was incised deep to adipose tissue with a #15 scalpel blade.  The skin margins were undermined to an appropriate distance in all directions utilizing iris scissors.
Transposition Flap Text: The defect edges were debeveled with a #15 scalpel blade.  Given the location of the defect and the proximity to free margins a transposition flap was deemed most appropriate.  Using a sterile surgical marker, an appropriate transposition flap was drawn incorporating the defect.    The area thus outlined was incised deep to adipose tissue with a #15 scalpel blade.  The skin margins were undermined to an appropriate distance in all directions utilizing iris scissors.
Muscle Hinge Flap Text: The defect edges were debeveled with a #15 scalpel blade.  Given the size, depth and location of the defect and the proximity to free margins a muscle hinge flap was deemed most appropriate.  Using a sterile surgical marker, an appropriate hinge flap was drawn incorporating the defect. The area thus outlined was incised with a #15 scalpel blade.  The skin margins were undermined to an appropriate distance in all directions utilizing iris scissors.
Mustarde Flap Text: The defect edges were debeveled with a #15 scalpel blade.  Given the size, depth and location of the defect and the proximity to free margins a Mustarde flap was deemed most appropriate.  Using a sterile surgical marker, an appropriate flap was drawn incorporating the defect. The area thus outlined was incised with a #15 scalpel blade.  The skin margins were undermined to an appropriate distance in all directions utilizing iris scissors.
Nasal Turnover Hinge Flap Text: The defect edges were debeveled with a #15 scalpel blade.  Given the size, depth, location of the defect and the defect being full thickness a nasal turnover hinge flap was deemed most appropriate.  Using a sterile surgical marker, an appropriate hinge flap was drawn incorporating the defect. The area thus outlined was incised with a #15 scalpel blade. The flap was designed to recreate the nasal mucosal lining and the alar rim. The skin margins were undermined to an appropriate distance in all directions utilizing iris scissors.
Nasalis-Muscle-Based Myocutaneous Island Pedicle Flap Text: Using a #15 blade, an incision was made around the donor flap to the level of the nasalis muscle. Wide lateral undermining was then performed in both the subcutaneous plane above the nasalis muscle, and in a submuscular plane just above periosteum. This allowed the formation of a free nasalis muscle axial pedicle (based on the angular artery) which was still attached to the actual cutaneous flap, increasing its mobility and vascular viability. Hemostasis was obtained with pinpoint electrocoagulation. The flap was mobilized into position and the pivotal anchor points positioned and stabilized with buried interrupted sutures. Subcutaneous and dermal tissues were closed in a multilayered fashion with sutures. Tissue redundancies were excised, and the epidermal edges were apposed without significant tension and sutured with sutures.
Orbicularis Oris Muscle Flap Text: The defect edges were debeveled with a #15 scalpel blade.  Given that the defect affected the competency of the oral sphincter an orbicularis oris muscle flap was deemed most appropriate to restore this competency and normal muscle function.  Using a sterile surgical marker, an appropriate flap was drawn incorporating the defect. The area thus outlined was incised with a #15 scalpel blade.
Melolabial Transposition Flap Text: The defect edges were debeveled with a #15 scalpel blade.  Given the location of the defect and the proximity to free margins a melolabial flap was deemed most appropriate.  Using a sterile surgical marker, an appropriate melolabial transposition flap was drawn incorporating the defect.    The area thus outlined was incised deep to adipose tissue with a #15 scalpel blade.  The skin margins were undermined to an appropriate distance in all directions utilizing iris scissors.
Rhombic Flap Text: The defect edges were debeveled with a #15 scalpel blade.  Given the location of the defect and the proximity to free margins a rhombic flap was deemed most appropriate.  Using a sterile surgical marker, an appropriate rhombic flap was drawn incorporating the defect.    The area thus outlined was incised deep to adipose tissue with a #15 scalpel blade.  The skin margins were undermined to an appropriate distance in all directions utilizing iris scissors.
Rhomboid Transposition Flap Text: The defect edges were debeveled with a #15 scalpel blade.  Given the location of the defect and the proximity to free margins a rhomboid transposition flap was deemed most appropriate.  Using a sterile surgical marker, an appropriate rhomboid flap was drawn incorporating the defect.    The area thus outlined was incised deep to adipose tissue with a #15 scalpel blade.  The skin margins were undermined to an appropriate distance in all directions utilizing iris scissors.
Bi-Rhombic Flap Text: The defect edges were debeveled with a #15 scalpel blade.  Given the location of the defect and the proximity to free margins a bi-rhombic flap was deemed most appropriate.  Using a sterile surgical marker, an appropriate rhombic flap was drawn incorporating the defect. The area thus outlined was incised deep to adipose tissue with a #15 scalpel blade.  The skin margins were undermined to an appropriate distance in all directions utilizing iris scissors.
Helical Rim Advancement Flap Text: The defect edges were debeveled with a #15 blade scalpel.  Given the location of the defect and the proximity to free margins (helical rim) a double helical rim advancement flap was deemed most appropriate.  Using a sterile surgical marker, the appropriate advancement flaps were drawn incorporating the defect and placing the expected incisions between the helical rim and antihelix where possible.  The area thus outlined was incised through and through with a #15 scalpel blade.  With a skin hook and iris scissors, the flaps were gently and sharply undermined and freed up.
Bilateral Helical Rim Advancement Flap Text: The defect edges were debeveled with a #15 blade scalpel.  Given the location of the defect and the proximity to free margins (helical rim) a bilateral helical rim advancement flap was deemed most appropriate.  Using a sterile surgical marker, the appropriate advancement flaps were drawn incorporating the defect and placing the expected incisions between the helical rim and antihelix where possible.  The area thus outlined was incised through and through with a #15 scalpel blade.  With a skin hook and iris scissors, the flaps were gently and sharply undermined and freed up.
Ear Star Wedge Flap Text: The defect edges were debeveled with a #15 blade scalpel.  Given the location of the defect and the proximity to free margins (helical rim) an ear star wedge flap was deemed most appropriate.  Using a sterile surgical marker, the appropriate flap was drawn incorporating the defect and placing the expected incisions between the helical rim and antihelix where possible.  The area thus outlined was incised through and through with a #15 scalpel blade.
Banner Transposition Flap Text: The defect edges were debeveled with a #15 scalpel blade.  Given the location of the defect and the proximity to free margins a Banner transposition flap was deemed most appropriate.  Using a sterile surgical marker, an appropriate flap drawn around the defect. The area thus outlined was incised deep to adipose tissue with a #15 scalpel blade.  The skin margins were undermined to an appropriate distance in all directions utilizing iris scissors.
Bilobed Flap Text: The defect edges were debeveled with a #15 scalpel blade.  Given the location of the defect and the proximity to free margins a bilobe flap was deemed most appropriate.  Using a sterile surgical marker, an appropriate bilobe flap drawn around the defect.    The area thus outlined was incised deep to adipose tissue with a #15 scalpel blade.  The skin margins were undermined to an appropriate distance in all directions utilizing iris scissors.
Bilobed Transposition Flap Text: The defect edges were debeveled with a #15 scalpel blade.  Given the location of the defect and the proximity to free margins a bilobed transposition flap was deemed most appropriate.  Using a sterile surgical marker, an appropriate bilobe flap drawn around the defect.    The area thus outlined was incised deep to adipose tissue with a #15 scalpel blade.  The skin margins were undermined to an appropriate distance in all directions utilizing iris scissors.
Trilobed Flap Text: The defect edges were debeveled with a #15 scalpel blade.  Given the location of the defect and the proximity to free margins a trilobed flap was deemed most appropriate.  Using a sterile surgical marker, an appropriate trilobed flap drawn around the defect.    The area thus outlined was incised deep to adipose tissue with a #15 scalpel blade.  The skin margins were undermined to an appropriate distance in all directions utilizing iris scissors.
Dorsal Nasal Flap Text: The defect edges were debeveled with a #15 scalpel blade.  Given the location of the defect and the proximity to free margins a dorsal nasal flap,based upon the glabellar folds, was deemed most appropriate.  Using a sterile surgical marker, an appropriate dorsal nasal flap was drawn around the defect.    The area thus outlined was incised deep to adipose tissue with a #15 scalpel blade.  The skin margins were undermined to an appropriate distance in all directions utilizing iris scissors.
Island Pedicle Flap Text: The defect edges were debeveled with a #15 scalpel blade.  Given the location of the defect, shape of the defect and the proximity to free margins an island pedicle advancement flap was deemed most appropriate.  Using a sterile surgical marker, an appropriate advancement flap was drawn incorporating the defect, outlining the appropriate donor tissue and placing the expected incisions within the relaxed skin tension lines where possible.    The area thus outlined was incised deep to adipose tissue with a #15 scalpel blade.  The skin margins were undermined to an appropriate distance in all directions around the primary defect and laterally outward around the island pedicle utilizing iris scissors.  There was minimal undermining beneath the pedicle flap.
Island Pedicle Flap With Canthal Suspension Text: The defect edges were debeveled with a #15 scalpel blade.  Given the location of the defect, shape of the defect and the proximity to free margins an island pedicle advancement flap was deemed most appropriate.  Using a sterile surgical marker, an appropriate advancement flap was drawn incorporating the defect, outlining the appropriate donor tissue and placing the expected incisions within the relaxed skin tension lines where possible. The area thus outlined was incised deep to adipose tissue with a #15 scalpel blade.  The skin margins were undermined to an appropriate distance in all directions around the primary defect and laterally outward around the island pedicle utilizing iris scissors.  There was minimal undermining beneath the pedicle flap. A suspension suture was placed in the canthal tendon to prevent tension and prevent ectropion.
Alar Island Pedicle Flap Text: The defect edges were debeveled with a #15 scalpel blade.  Given the location of the defect, shape of the defect and the proximity to the alar rim an island pedicle advancement flap was deemed most appropriate.  Using a sterile surgical marker, an appropriate advancement flap was drawn incorporating the defect, outlining the appropriate donor tissue and placing the expected incisions within the nasal ala running parallel to the alar rim. The area thus outlined was incised with a #15 scalpel blade.  The skin margins were undermined minimally to an appropriate distance in all directions around the primary defect and laterally outward around the island pedicle utilizing iris scissors.  There was minimal undermining beneath the pedicle flap.
Double Island Pedicle Flap Text: The defect edges were debeveled with a #15 scalpel blade.  Given the location of the defect, shape of the defect and the proximity to free margins a double island pedicle advancement flap was deemed most appropriate.  Using a sterile surgical marker, an appropriate advancement flap was drawn incorporating the defect, outlining the appropriate donor tissue and placing the expected incisions within the relaxed skin tension lines where possible.    The area thus outlined was incised deep to adipose tissue with a #15 scalpel blade.  The skin margins were undermined to an appropriate distance in all directions around the primary defect and laterally outward around the island pedicle utilizing iris scissors.  There was minimal undermining beneath the pedicle flap.
Island Pedicle Flap-Requiring Vessel Identification Text: The defect edges were debeveled with a #15 scalpel blade.  Given the location of the defect, shape of the defect and the proximity to free margins an island pedicle advancement flap was deemed most appropriate.  Using a sterile surgical marker, an appropriate advancement flap was drawn, based on the axial vessel mentioned above, incorporating the defect, outlining the appropriate donor tissue and placing the expected incisions within the relaxed skin tension lines where possible.    The area thus outlined was incised deep to adipose tissue with a #15 scalpel blade.  The skin margins were undermined to an appropriate distance in all directions around the primary defect and laterally outward around the island pedicle utilizing iris scissors.  There was minimal undermining beneath the pedicle flap.
Keystone Flap Text: The defect edges were debeveled with a #15 scalpel blade.  Given the location of the defect, shape of the defect a keystone flap was deemed most appropriate.  Using a sterile surgical marker, an appropriate keystone flap was drawn incorporating the defect, outlining the appropriate donor tissue and placing the expected incisions within the relaxed skin tension lines where possible. The area thus outlined was incised deep to adipose tissue with a #15 scalpel blade.  The skin margins were undermined to an appropriate distance in all directions around the primary defect and laterally outward around the flap utilizing iris scissors.
O-T Plasty Text: The defect edges were debeveled with a #15 scalpel blade.  Given the location of the defect, shape of the defect and the proximity to free margins an O-T plasty was deemed most appropriate.  Using a sterile surgical marker, an appropriate O-T plasty was drawn incorporating the defect and placing the expected incisions within the relaxed skin tension lines where possible.    The area thus outlined was incised deep to adipose tissue with a #15 scalpel blade.  The skin margins were undermined to an appropriate distance in all directions utilizing iris scissors.
O-Z Plasty Text: The defect edges were debeveled with a #15 scalpel blade.  Given the location of the defect, shape of the defect and the proximity to free margins an O-Z plasty (double transposition flap) was deemed most appropriate.  Using a sterile surgical marker, the appropriate transposition flaps were drawn incorporating the defect and placing the expected incisions within the relaxed skin tension lines where possible.    The area thus outlined was incised deep to adipose tissue with a #15 scalpel blade.  The skin margins were undermined to an appropriate distance in all directions utilizing iris scissors.  Hemostasis was achieved with electrocautery.  The flaps were then transposed into place, one clockwise and the other counterclockwise, and anchored with interrupted buried subcutaneous sutures.
Double O-Z Plasty Text: The defect edges were debeveled with a #15 scalpel blade.  Given the location of the defect, shape of the defect and the proximity to free margins a Double O-Z plasty (double transposition flap) was deemed most appropriate.  Using a sterile surgical marker, the appropriate transposition flaps were drawn incorporating the defect and placing the expected incisions within the relaxed skin tension lines where possible. The area thus outlined was incised deep to adipose tissue with a #15 scalpel blade.  The skin margins were undermined to an appropriate distance in all directions utilizing iris scissors.  Hemostasis was achieved with electrocautery.  The flaps were then transposed into place, one clockwise and the other counterclockwise, and anchored with interrupted buried subcutaneous sutures.
V-Y Plasty Text: The defect edges were debeveled with a #15 scalpel blade.  Given the location of the defect, shape of the defect and the proximity to free margins an V-Y advancement flap was deemed most appropriate.  Using a sterile surgical marker, an appropriate advancement flap was drawn incorporating the defect and placing the expected incisions within the relaxed skin tension lines where possible.    The area thus outlined was incised deep to adipose tissue with a #15 scalpel blade.  The skin margins were undermined to an appropriate distance in all directions utilizing iris scissors.
H Plasty Text: Given the location of the defect, shape of the defect and the proximity to free margins a H-plasty was deemed most appropriate for repair.  Using a sterile surgical marker, the appropriate advancement arms of the H-plasty were drawn incorporating the defect and placing the expected incisions within the relaxed skin tension lines where possible. The area thus outlined was incised deep to adipose tissue with a #15 scalpel blade. The skin margins were undermined to an appropriate distance in all directions utilizing iris scissors.  The opposing advancement arms were then advanced into place in opposite direction and anchored with interrupted buried subcutaneous sutures.
W Plasty Text: The lesion was extirpated to the level of the fat with a #15 scalpel blade.  Given the location of the defect, shape of the defect and the proximity to free margins a W-plasty was deemed most appropriate for repair.  Using a sterile surgical marker, the appropriate transposition arms of the W-plasty were drawn incorporating the defect and placing the expected incisions within the relaxed skin tension lines where possible.    The area thus outlined was incised deep to adipose tissue with a #15 scalpel blade.  The skin margins were undermined to an appropriate distance in all directions utilizing iris scissors.  The opposing transposition arms were then transposed into place in opposite direction and anchored with interrupted buried subcutaneous sutures.
Z Plasty Text: The lesion was extirpated to the level of the fat with a #15 scalpel blade.  Given the location of the defect, shape of the defect and the proximity to free margins a Z-plasty was deemed most appropriate for repair.  Using a sterile surgical marker, the appropriate transposition arms of the Z-plasty were drawn incorporating the defect and placing the expected incisions within the relaxed skin tension lines where possible.    The area thus outlined was incised deep to adipose tissue with a #15 scalpel blade.  The skin margins were undermined to an appropriate distance in all directions utilizing iris scissors.  The opposing transposition arms were then transposed into place in opposite direction and anchored with interrupted buried subcutaneous sutures.
Double Z Plasty Text: The lesion was extirpated to the level of the fat with a #15 scalpel blade. Given the location of the defect, shape of the defect and the proximity to free margins a double Z-plasty was deemed most appropriate for repair. Using a sterile surgical marker, the appropriate transposition arms of the double Z-plasty were drawn incorporating the defect and placing the expected incisions within the relaxed skin tension lines where possible. The area thus outlined was incised deep to adipose tissue with a #15 scalpel blade. The skin margins were undermined to an appropriate distance in all directions utilizing iris scissors. The opposing transposition arms were then transposed and carried over into place in opposite direction and anchored with interrupted buried subcutaneous sutures.
Zygomaticofacial Flap Text: Given the location of the defect, shape of the defect and the proximity to free margins a zygomaticofacial flap was deemed most appropriate for repair.  Using a sterile surgical marker, the appropriate flap was drawn incorporating the defect and placing the expected incisions within the relaxed skin tension lines where possible. The area thus outlined was incised deep to adipose tissue with a #15 scalpel blade with preservation of a vascular pedicle.  The skin margins were undermined to an appropriate distance in all directions utilizing iris scissors.  The flap was then placed into the defect and anchored with interrupted buried subcutaneous sutures.
Cheek Interpolation Flap Text: A decision was made to reconstruct the defect utilizing an interpolation axial flap and a staged reconstruction.  A telfa template was made of the defect.  This telfa template was then used to outline the Cheek Interpolation flap.  The donor area for the pedicle flap was then injected with anesthesia.  The flap was excised through the skin and subcutaneous tissue down to the layer of the underlying musculature.  The interpolation flap was carefully excised within this deep plane to maintain its blood supply.  The edges of the donor site were undermined.   The donor site was closed in a primary fashion.  The pedicle was then rotated into position and sutured.  Once the tube was sutured into place, adequate blood supply was confirmed with blanching and refill.  The pedicle was then wrapped with xeroform gauze and dressed appropriately with a telfa and gauze bandage to ensure continued blood supply and protect the attached pedicle.
Cheek-To-Nose Interpolation Flap Text: A decision was made to reconstruct the defect utilizing an interpolation axial flap and a staged reconstruction.  A telfa template was made of the defect.  This telfa template was then used to outline the Cheek-To-Nose Interpolation flap.  The donor area for the pedicle flap was then injected with anesthesia.  The flap was excised through the skin and subcutaneous tissue down to the layer of the underlying musculature.  The interpolation flap was carefully excised within this deep plane to maintain its blood supply.  The edges of the donor site were undermined.   The donor site was closed in a primary fashion.  The pedicle was then rotated into position and sutured.  Once the tube was sutured into place, adequate blood supply was confirmed with blanching and refill.  The pedicle was then wrapped with xeroform gauze and dressed appropriately with a telfa and gauze bandage to ensure continued blood supply and protect the attached pedicle.
Interpolation Flap Text: A decision was made to reconstruct the defect utilizing an interpolation axial flap and a staged reconstruction.  A telfa template was made of the defect.  This telfa template was then used to outline the interpolation flap.  The donor area for the pedicle flap was then injected with anesthesia.  The flap was excised through the skin and subcutaneous tissue down to the layer of the underlying musculature.  The interpolation flap was carefully excised within this deep plane to maintain its blood supply.  The edges of the donor site were undermined.   The donor site was closed in a primary fashion.  The pedicle was then rotated into position and sutured.  Once the tube was sutured into place, adequate blood supply was confirmed with blanching and refill.  The pedicle was then wrapped with xeroform gauze and dressed appropriately with a telfa and gauze bandage to ensure continued blood supply and protect the attached pedicle.
Melolabial Interpolation Flap Text: A decision was made to reconstruct the defect utilizing an interpolation axial flap and a staged reconstruction.  A telfa template was made of the defect.  This telfa template was then used to outline the melolabial interpolation flap.  The donor area for the pedicle flap was then injected with anesthesia.  The flap was excised through the skin and subcutaneous tissue down to the layer of the underlying musculature.  The pedicle flap was carefully excised within this deep plane to maintain its blood supply.  The edges of the donor site were undermined.   The donor site was closed in a primary fashion.  The pedicle was then rotated into position and sutured.  Once the tube was sutured into place, adequate blood supply was confirmed with blanching and refill.  The pedicle was then wrapped with xeroform gauze and dressed appropriately with a telfa and gauze bandage to ensure continued blood supply and protect the attached pedicle.
Mastoid Interpolation Flap Text: A decision was made to reconstruct the defect utilizing an interpolation axial flap and a staged reconstruction.  A telfa template was made of the defect.  This telfa template was then used to outline the mastoid interpolation flap.  The donor area for the pedicle flap was then injected with anesthesia.  The flap was excised through the skin and subcutaneous tissue down to the layer of the underlying musculature.  The pedicle flap was carefully excised within this deep plane to maintain its blood supply.  The edges of the donor site were undermined.   The donor site was closed in a primary fashion.  The pedicle was then rotated into position and sutured.  Once the tube was sutured into place, adequate blood supply was confirmed with blanching and refill.  The pedicle was then wrapped with xeroform gauze and dressed appropriately with a telfa and gauze bandage to ensure continued blood supply and protect the attached pedicle.
Posterior Auricular Interpolation Flap Text: A decision was made to reconstruct the defect utilizing an interpolation axial flap and a staged reconstruction.  A telfa template was made of the defect.  This telfa template was then used to outline the posterior auricular interpolation flap.  The donor area for the pedicle flap was then injected with anesthesia.  The flap was excised through the skin and subcutaneous tissue down to the layer of the underlying musculature.  The pedicle flap was carefully excised within this deep plane to maintain its blood supply.  The edges of the donor site were undermined.   The donor site was closed in a primary fashion.  The pedicle was then rotated into position and sutured.  Once the tube was sutured into place, adequate blood supply was confirmed with blanching and refill.  The pedicle was then wrapped with xeroform gauze and dressed appropriately with a telfa and gauze bandage to ensure continued blood supply and protect the attached pedicle.
Paramedian Forehead Flap Text: A decision was made to reconstruct the defect utilizing an interpolation axial flap and a staged reconstruction.  A telfa template was made of the defect.  This telfa template was then used to outline the paramedian forehead pedicle flap.  The donor area for the pedicle flap was then injected with anesthesia.  The flap was excised through the skin and subcutaneous tissue down to the layer of the underlying musculature.  The pedicle flap was carefully excised within this deep plane to maintain its blood supply.  The edges of the donor site were undermined.   The donor site was closed in a primary fashion.  The pedicle was then rotated into position and sutured.  Once the tube was sutured into place, adequate blood supply was confirmed with blanching and refill.  The pedicle was then wrapped with xeroform gauze and dressed appropriately with a telfa and gauze bandage to ensure continued blood supply and protect the attached pedicle.
Abbe Flap (Upper To Lower Lip) Text: The defect of the lower lip was assessed and measured.  Given the location and size of the defect, an Abbe flap was deemed most appropriate.  Using a sterile surgical marker, an appropriate Abbe flap was measured and drawn on the upper lip. Local anesthesia was then infiltrated.  A scalpel was then used to incise the upper lip through and through the skin, vermilion, muscle and mucosa, leaving the flap pedicled on the opposite side.  The flap was then rotated and transferred to the lower lip defect.  The flap was then sutured into place with a three layer technique, closing the orbicularis oris muscle layer with subcutaneous buried sutures, followed by a mucosal layer and an epidermal layer.
Abbe Flap (Lower To Upper Lip) Text: The defect of the upper lip was assessed and measured.  Given the location and size of the defect, an Abbe flap was deemed most appropriate.  Using a sterile surgical marker, an appropriate Abbe flap was measured and drawn on the lower lip. Local anesthesia was then infiltrated. A scalpel was then used to incise the upper lip through and through the skin, vermilion, muscle and mucosa, leaving the flap pedicled on the opposite side.  The flap was then rotated and transferred to the lower lip defect.  The flap was then sutured into place with a three layer technique, closing the orbicularis oris muscle layer with subcutaneous buried sutures, followed by a mucosal layer and an epidermal layer.
Estlander Flap (Upper To Lower Lip) Text: The defect of the lower lip was assessed and measured.  Given the location and size of the defect, an Estlander flap was deemed most appropriate.  Using a sterile surgical marker, an appropriate Estlander flap was measured and drawn on the upper lip. Local anesthesia was then infiltrated. A scalpel was then used to incise the lateral aspect of the flap, through skin, muscle and mucosa, leaving the flap pedicled medially.  The flap was then rotated and positioned to fill the lower lip defect.  The flap was then sutured into place with a three layer technique, closing the orbicularis oris muscle layer with subcutaneous buried sutures, followed by a mucosal layer and an epidermal layer.
Cheiloplasty (Less Than 50%) Text: A decision was made to reconstruct the defect with a  cheiloplasty.  The defect was undermined extensively.  Additional obicularis oris muscle was excised with a 15 blade scalpel.  The defect was converted into a full thickness wedge, of less than 50% of the vertical height of the lip, to facilite a better cosmetic result.  Small vessels were then tied off with 5-0 monocyrl. The obicularis oris, superficial fascia, adipose and dermis were then reapproximated.  After the deeper layers were approximated the epidermis was reapproximated with particular care given to realign the vermilion border.
Cheiloplasty (Complex) Text: A decision was made to reconstruct the defect with a  cheiloplasty.  The defect was undermined extensively.  Additional obicularis oris muscle was excised with a 15 blade scalpel.  The defect was converted into a full thickness wedge to facilite a better cosmetic result.  Small vessels were then tied off with 5-0 monocyrl. The obicularis oris, superficial fascia, adipose and dermis were then reapproximated.  After the deeper layers were approximated the epidermis was reapproximated with particular care given to realign the vermilion border.
Ear Wedge Repair Text: A wedge excision was completed by carrying down an excision through the full thickness of the ear and cartilage with an inward facing Burow's triangle. The wound was then closed in a layered fashion.
Full Thickness Lip Wedge Repair (Flap) Text: Given the location of the defect and the proximity to free margins a full thickness wedge repair was deemed most appropriate.  Using a sterile surgical marker, the appropriate repair was drawn incorporating the defect and placing the expected incisions perpendicular to the vermilion border.  The vermilion border was also meticulously outlined to ensure appropriate reapproximation during the repair.  The area thus outlined was incised through and through with a #15 scalpel blade.  The muscularis and dermis were reaproximated with deep sutures following hemostasis. Care was taken to realign the vermilion border before proceeding with the superficial closure.  Once the vermilion was realigned the superfical and mucosal closure was finished.
Ftsg Text: The defect edges were debeveled with a #15 scalpel blade.  Given the location of the defect, shape of the defect and the proximity to free margins a full thickness skin graft was deemed most appropriate.  Using a sterile surgical marker, the primary defect shape was transferred to the donor site. The area thus outlined was incised deep to adipose tissue with a #15 scalpel blade.  The harvested graft was then trimmed of adipose tissue until only dermis and epidermis was left.  The skin margins of the secondary defect were undermined to an appropriate distance in all directions utilizing iris scissors.  The secondary defect was closed with interrupted buried subcutaneous sutures.  The skin edges were then re-apposed with running  sutures.  The skin graft was then placed in the primary defect and oriented appropriately.
Split-Thickness Skin Graft Text: The defect edges were debeveled with a #15 scalpel blade.  Given the location of the defect, shape of the defect and the proximity to free margins a split thickness skin graft was deemed most appropriate.  Using a sterile surgical marker, the primary defect shape was transferred to the donor site. The split thickness graft was then harvested.  The skin graft was then placed in the primary defect and oriented appropriately.
Pinch Graft Text: The defect edges were debeveled with a #15 scalpel blade. Given the location of the defect, shape of the defect and the proximity to free margins a pinch graft was deemed most appropriate. Using a sterile surgical marker, the primary defect shape was transferred to the donor site. The area thus outlined was incised deep to adipose tissue with a #15 scalpel blade.  The harvested graft was then trimmed of adipose tissue until only dermis and epidermis was left. The skin margins of the secondary defect were undermined to an appropriate distance in all directions utilizing iris scissors.  The secondary defect was closed with interrupted buried subcutaneous sutures.  The skin edges were then re-apposed with running  sutures.  The skin graft was then placed in the primary defect and oriented appropriately.
Burow's Graft Text: The defect edges were debeveled with a #15 scalpel blade.  Given the location of the defect, shape of the defect, the proximity to free margins and the presence of a standing cone deformity a Burow's skin graft was deemed most appropriate. The standing cone was removed and this tissue was then trimmed to the shape of the primary defect. The adipose tissue was also removed until only dermis and epidermis were left.  The skin margins of the secondary defect were undermined to an appropriate distance in all directions utilizing iris scissors.  The secondary defect was closed with interrupted buried subcutaneous sutures.  The skin edges were then re-apposed with running  sutures.  The skin graft was then placed in the primary defect and oriented appropriately.
Cartilage Graft Text: The defect edges were debeveled with a #15 scalpel blade.  Given the location of the defect, shape of the defect, the fact the defect involved a full thickness cartilage defect a cartilage graft was deemed most appropriate.  An appropriate donor site was identified, cleansed, and anesthetized. The cartilage graft was then harvested and transferred to the recipient site, oriented appropriately and then sutured into place.  The secondary defect was then repaired using a primary closure.
Composite Graft Text: The defect edges were debeveled with a #15 scalpel blade.  Given the location of the defect, shape of the defect, the proximity to free margins and the fact the defect was full thickness a composite graft was deemed most appropriate.  The defect was outline and then transferred to the donor site.  A full thickness graft was then excised from the donor site. The graft was then placed in the primary defect, oriented appropriately and then sutured into place.  The secondary defect was then repaired using a primary closure.
Epidermal Autograft Text: The defect edges were debeveled with a #15 scalpel blade.  Given the location of the defect, shape of the defect and the proximity to free margins an epidermal autograft was deemed most appropriate.  Using a sterile surgical marker, the primary defect shape was transferred to the donor site. The epidermal graft was then harvested.  The skin graft was then placed in the primary defect and oriented appropriately.
Dermal Autograft Text: The defect edges were debeveled with a #15 scalpel blade.  Given the location of the defect, shape of the defect and the proximity to free margins a dermal autograft was deemed most appropriate.  Using a sterile surgical marker, the primary defect shape was transferred to the donor site. The area thus outlined was incised deep to adipose tissue with a #15 scalpel blade.  The harvested graft was then trimmed of adipose and epidermal tissue until only dermis was left.  The skin graft was then placed in the primary defect and oriented appropriately.
Skin Substitute Text: The defect edges were debeveled with a #15 scalpel blade.  Given the location of the defect, shape of the defect and the proximity to free margins a skin substitute graft was deemed most appropriate.  The graft material was trimmed to fit the size of the defect. The graft was then placed in the primary defect and oriented appropriately.
Tissue Cultured Epidermal Autograft Text: The defect edges were debeveled with a #15 scalpel blade.  Given the location of the defect, shape of the defect and the proximity to free margins a tissue cultured epidermal autograft was deemed most appropriate.  The graft was then trimmed to fit the size of the defect.  The graft was then placed in the primary defect and oriented appropriately.
Xenograft Text: The defect edges were debeveled with a #15 scalpel blade.  Given the location of the defect, shape of the defect and the proximity to free margins a xenograft was deemed most appropriate.  The graft was then trimmed to fit the size of the defect.  The graft was then placed in the primary defect and oriented appropriately.
Purse String (Simple) Text: Given the location of the defect and the characteristics of the surrounding skin a purse string closure was deemed most appropriate.  Undermining was performed circumfirentially around the surgical defect.  A purse string suture was then placed and tightened.
Purse String (Intermediate) Text: Given the location of the defect and the characteristics of the surrounding skin a purse string intermediate closure was deemed most appropriate.  Undermining was performed circumfirentially around the surgical defect.  A purse string suture was then placed and tightened.
Partial Purse String (Simple) Text: Given the location of the defect and the characteristics of the surrounding skin a simple purse string closure was deemed most appropriate.  Undermining was performed circumfirentially around the surgical defect.  A purse string suture was then placed and tightened. Wound tension only allowed a partial closure of the circular defect.
Partial Purse String (Intermediate) Text: Given the location of the defect and the characteristics of the surrounding skin an intermediate purse string closure was deemed most appropriate.  Undermining was performed circumfirentially around the surgical defect.  A purse string suture was then placed and tightened. Wound tension only allowed a partial closure of the circular defect.
Localized Dermabrasion With Wire Brush Text: The patient was draped in routine manner.  Localized dermabrasion using 3 x 17 mm wire brush was performed in routine manner to papillary dermis. This spot dermabrasion is being performed to complete skin cancer reconstruction. It also will eliminate the other sun damaged precancerous cells that are known to be part of the regional effect of a lifetime's worth of sun exposure. This localized dermabrasion is therapeutic and should not be considered cosmetic in any regard.
Tarsorrhaphy Text: A tarsorrhaphy was performed using Frost sutures.
Intermediate Repair And Flap Additional Text (Will Appearing After The Standard Complex Repair Text): The intermediate repair was not sufficient to completely close the primary defect. The remaining additional defect was repaired with the flap mentioned below.
Intermediate Repair And Graft Additional Text (Will Appearing After The Standard Complex Repair Text): The intermediate repair was not sufficient to completely close the primary defect. The remaining additional defect was repaired with the graft mentioned below.
Complex Repair And Flap Additional Text (Will Appearing After The Standard Complex Repair Text): The complex repair was not sufficient to completely close the primary defect. The remaining additional defect was repaired with the flap mentioned below.
Complex Repair And Graft Additional Text (Will Appearing After The Standard Complex Repair Text): The complex repair was not sufficient to completely close the primary defect. The remaining additional defect was repaired with the graft mentioned below.
Eyelid Full Thickness Repair - 26761: The eyelid defect was full thickness which required a wedge repair of the eyelid. Special care was taken to ensure that the eyelid margin was realligned when placing sutures.
Manual Repair Warning Statement: We plan on removing the manually selected variable below in favor of our much easier automatic structured text blocks found in the previous tab. We decided to do this to help make the flow better and give you the full power of structured data. Manual selection is never going to be ideal in our platform and I would encourage you to avoid using manual selection from this point on, especially since I will be sunsetting this feature. It is important that you do one of two things with the customized text below. First, you can save all of the text in a word file so you can have it for future reference. Second, transfer the text to the appropriate area in the Library tab. Lastly, if there is a flap or graft type which we do not have you need to let us know right away so I can add it in before the variable is hidden. No need to panic, we plan to give you roughly 6 months to make the change.
Same Histology In Subsequent Stages Text: The pattern and morphology of the tumor is as described in the first stage.
No Residual Tumor Seen Histology Text: There were no malignant cells seen in the sections examined.
Inflammation Suggestive Of Cancer Camouflage Histology Text: There was a dense lymphocytic infiltrate which prevented adequate histologic evaluation of adjacent structures.
Bcc Histology Text: There were numerous aggregates of basaloid cells.
Bcc Infiltrative Histology Text: There were numerous aggregates of basaloid cells demonstrating an infiltrative pattern.
Mart-1 - Positive Histology Text: MART-1 staining demonstrates areas of higher density and clustering of melanocytes with Pagetoid spread upwards within the epidermis. The surgical margins are positive for tumor cells.
Mart-1 - Negative Histology Text: MART-1 staining demonstrates a normal density and pattern of melanocytes along the dermal-epidermal junction. The surgical margins are negative for tumor cells.
Information: Selecting Yes will display possible errors in your note based on the variables you have selected. This validation is only offered as a suggestion for you. PLEASE NOTE THAT THE VALIDATION TEXT WILL BE REMOVED WHEN YOU FINALIZE YOUR NOTE. IF YOU WANT TO FAX A PRELIMINARY NOTE YOU WILL NEED TO TOGGLE THIS TO 'NO' IF YOU DO NOT WANT IT IN YOUR FAXED NOTE.

## 2024-01-27 DIAGNOSIS — M62.838 MUSCLE SPASM: ICD-10-CM

## 2024-01-28 RX ORDER — CYCLOBENZAPRINE HCL 10 MG
TABLET ORAL
Qty: 40 TABLET | Refills: 1 | Status: SHIPPED | OUTPATIENT
Start: 2024-01-28

## 2024-01-30 ENCOUNTER — APPOINTMENT (RX ONLY)
Dept: URBAN - METROPOLITAN AREA CLINIC 36 | Facility: CLINIC | Age: 79
Setting detail: DERMATOLOGY
End: 2024-01-30

## 2024-01-30 DIAGNOSIS — Z48.02 ENCOUNTER FOR REMOVAL OF SUTURES: ICD-10-CM

## 2024-01-30 PROCEDURE — ? SUTURE REMOVAL (GLOBAL PERIOD)

## 2024-01-30 ASSESSMENT — LOCATION SIMPLE DESCRIPTION DERM: LOCATION SIMPLE: NOSE

## 2024-01-30 ASSESSMENT — LOCATION ZONE DERM: LOCATION ZONE: NOSE

## 2024-01-30 ASSESSMENT — LOCATION DETAILED DESCRIPTION DERM: LOCATION DETAILED: RIGHT NASAL DORSUM

## 2024-01-30 NOTE — PROCEDURE: SUTURE REMOVAL (GLOBAL PERIOD)
Add 28158 Cpt? (Important Note: In 2017 The Use Of 52427 Is Being Tracked By Cms To Determine Future Global Period Reimbursement For Global Periods): no
Detail Level: Detailed

## 2024-02-20 ENCOUNTER — RX ONLY (OUTPATIENT)
Age: 79
Setting detail: RX ONLY
End: 2024-02-20

## 2024-02-20 RX ORDER — IMIQUIMOD 12.5 MG/.25G
1 CREAM TOPICAL QD
Qty: 48 | Refills: 2 | Status: ERX | COMMUNITY
Start: 2024-02-20

## 2024-02-28 ENCOUNTER — HOSPITAL ENCOUNTER (OUTPATIENT)
Dept: RADIOLOGY | Facility: MEDICAL CENTER | Age: 79
End: 2024-02-28
Attending: PHYSICIAN ASSISTANT
Payer: MEDICARE

## 2024-02-28 DIAGNOSIS — M23.8X2 OTHER INTERNAL DERANGEMENTS OF LEFT KNEE: ICD-10-CM

## 2024-02-28 DIAGNOSIS — M17.12 UNILATERAL PRIMARY OSTEOARTHRITIS, LEFT KNEE: ICD-10-CM

## 2024-02-28 PROCEDURE — 73721 MRI JNT OF LWR EXTRE W/O DYE: CPT | Mod: LT

## 2024-03-17 DIAGNOSIS — E11.9 TYPE 2 DIABETES MELLITUS WITHOUT COMPLICATION, WITHOUT LONG-TERM CURRENT USE OF INSULIN (HCC): ICD-10-CM

## 2024-03-18 RX ORDER — METFORMIN HYDROCHLORIDE 500 MG/1
1000 TABLET, EXTENDED RELEASE ORAL DAILY
Qty: 180 TABLET | Refills: 3 | Status: SHIPPED | OUTPATIENT
Start: 2024-03-18

## 2024-03-20 ENCOUNTER — OFFICE VISIT (OUTPATIENT)
Dept: CARDIOLOGY | Facility: MEDICAL CENTER | Age: 79
End: 2024-03-20
Attending: INTERNAL MEDICINE
Payer: MEDICARE

## 2024-03-20 VITALS
HEIGHT: 71 IN | SYSTOLIC BLOOD PRESSURE: 110 MMHG | RESPIRATION RATE: 16 BRPM | DIASTOLIC BLOOD PRESSURE: 54 MMHG | OXYGEN SATURATION: 96 % | WEIGHT: 246 LBS | BODY MASS INDEX: 34.44 KG/M2 | HEART RATE: 84 BPM

## 2024-03-20 DIAGNOSIS — I24.0 ISCHEMIC HEART DISEASE DUE TO CORONARY ARTERY OBSTRUCTION (HCC): ICD-10-CM

## 2024-03-20 DIAGNOSIS — Z95.5 STENTED CORONARY ARTERY: ICD-10-CM

## 2024-03-20 DIAGNOSIS — I25.83 CORONARY ATHEROSCLEROSIS DUE TO LIPID RICH PLAQUE: Chronic | ICD-10-CM

## 2024-03-20 DIAGNOSIS — I25.9 ISCHEMIC HEART DISEASE DUE TO CORONARY ARTERY OBSTRUCTION (HCC): ICD-10-CM

## 2024-03-20 DIAGNOSIS — E78.5 DYSLIPIDEMIA: ICD-10-CM

## 2024-03-20 DIAGNOSIS — I25.10 CORONARY ATHEROSCLEROSIS DUE TO LIPID RICH PLAQUE: Chronic | ICD-10-CM

## 2024-03-20 PROCEDURE — 3074F SYST BP LT 130 MM HG: CPT | Performed by: INTERNAL MEDICINE

## 2024-03-20 PROCEDURE — 99213 OFFICE O/P EST LOW 20 MIN: CPT | Performed by: INTERNAL MEDICINE

## 2024-03-20 PROCEDURE — 99214 OFFICE O/P EST MOD 30 MIN: CPT | Performed by: INTERNAL MEDICINE

## 2024-03-20 PROCEDURE — 3078F DIAST BP <80 MM HG: CPT | Performed by: INTERNAL MEDICINE

## 2024-03-20 RX ORDER — CLOPIDOGREL BISULFATE 75 MG/1
75 TABLET ORAL DAILY
Qty: 90 TABLET | Refills: 3 | Status: SHIPPED | OUTPATIENT
Start: 2024-03-20

## 2024-03-20 ASSESSMENT — FIBROSIS 4 INDEX: FIB4 SCORE: 0.84

## 2024-03-20 NOTE — PROGRESS NOTES
"Chief Complaint   Patient presents with    Hypertension    Coronary Artery Disease     F/v dx: Coronary arteriosclerosis due to lipid rich plaque    Hyperlipidemia       Subjective     Myke Steele is a 78 y.o. male who presents today for follow up follow-up of his history of coronary disease with prior stenting he also has abnormal stress test now with small scars preserved ejection fraction on echo with diabetes     Has  had multiple surgeries    Past Medical History:   Diagnosis Date    Anesthesia 10/2021    Slow to come out of anesthesia    Arrhythmia     RBBB    Basal cell adenocarcinoma     left wrist    Benign localized hyperplasia of prostate with urinary obstruction and other lower urinary tract symptoms (LUTS)(600.21) 08/09/2012    Benign prostatic hypertrophy     Class 2 severe obesity with serious comorbidity and body mass index (BMI) of 38.0 to 38.9 in adult ([facility]) 08/31/2018    Coronary arteriosclerosis due to lipid rich plaque     Coronary atherosclerosis of native coronary artery 08/09/2012    Diabetes ([facility])     oral meds    Elevated CPK     Essential hypertriglyceridemia 08/09/2012    Heart burn     High cholesterol     Hypercholesterolemia 08/09/2012    Hypertension     Hypertriglyceridemia     Lumbar spinal stenosis     Obesity 08/28/2014    Obesity (BMI 30-39.9)     Pain 04/07/2015    Bilateral lower back, radiating down buttocks=\"aches\" worse with activity    Stented coronary artery 08/09/2012    Unspecified cataract     R IOL    Unspecified disorder of thyroid      Past Surgical History:   Procedure Laterality Date    PB RECONSTR TOTAL SHOULDER IMPLANT Left 2/3/2023    Procedure: LEFT REVERSE TOTAL SHOULDER ARTHROPLASTY;  Surgeon: Radha Brothers M.D.;  Location: SURGERY [facility];  Service: Orthopedics    CA SHLDR ARTHROSCOP,PART ACROMIOPLAS Right 11/12/2021    Procedure: DECOMPRESSION, SHOULDER, ARTHROSCOPIC - SUBACROMIAL;  Surgeon: Radha Brothers M.D.;  Location: SURGERY " [facility];  Service: Orthopedics    PB SHLDR ARTHROSCOP,SURG,W/ROTAT CUFF REPB Right 11/12/2021    Procedure: ARTHROSCOPY, SHOULDER, WITH ROTATOR CUFF REPAIR - SUPERIOR CAPSULAR RECONSTRUCTION, REPAIRS AS INDICATED;  Surgeon: Radha Brothers M.D.;  Location: SURGERY [facility];  Service: Orthopedics    DEBRIDEMENT, LABRUM, HIP, ARTHROSCOPIC Right 11/12/2021    Procedure: ARTHROSCOPIC LABRAL DEBRIDEMENT;  Surgeon: Radha Brothers M.D.;  Location: SURGERY [facility];  Service: Orthopedics    SHOULDER ARTHROSCOPY Left 2016    LUMBAR LAMINECTOMY DISKECTOMY  4/29/2015    Performed by Juan Pablo Betts M.D. at SURGERY [facility]    OTHER CARDIAC SURGERY  2008    2 Cardiac stents    STAPEDECTOMY      bilateral ears; only worked on the R ear     Family History   Problem Relation Age of Onset    Heart Failure Father     Heart Disease Father         valvular    Hypertension Mother     Hyperlipidemia Mother     Hyperlipidemia Brother     Cancer Brother         skin     Social History     Socioeconomic History    Marital status:      Spouse name: Not on file    Number of children: Not on file    Years of education: Not on file    Highest education level: Not on file   Occupational History    Not on file   Tobacco Use    Smoking status: Never    Smokeless tobacco: Never   Vaping Use    Vaping Use: Never used   Substance and Sexual Activity    Alcohol use: Yes     Comment: 1 per month    Drug use: No    Sexual activity: Not on file     Comment: , 2 daughters, retired    Other Topics Concern    Not on file   Social History Narrative    Retired,  to [employer], 2 daughters     Social Determinants of Health     Financial Resource Strain: Not on file   Food Insecurity: Not on file   Transportation Needs: Not on file   Physical Activity: Not on file   Stress: Not on file   Social Connections: Not on file   Intimate Partner Violence: Not on file   Housing Stability: Not on file     Allergies   Allergen  Reactions    Atorvastatin      Elevated CPK     Outpatient Encounter Medications as of 3/20/2024   Medication Sig Dispense Refill    baclofen (LIORESAL) 10 MG Tab Take 0.5-1 Tablets by mouth 3 times a day as needed (muscle spasm). 90 Tablet 2    metFORMIN ER (GLUCOPHAGE XR) 500 MG TABLET SR 24 HR TAKE 2 TABLETS BY MOUTH EVERY  Tablet 3    cyclobenzaprine (FLEXERIL) 10 mg Tab TAKE 1 TABLET BY MOUTH TWICE A DAY AS NEEDED for pain 40 Tablet 1    meloxicam (MOBIC) 7.5 MG Tab TAKE 1 TABLET BY MOUTH 2 TIMES A DAY AS NEEDED FOR INFLAMMATION FOR UP TO 90 DAYS. 180 Tablet 0    amLODIPine (NORVASC) 10 MG Tab Take 1 Tablet by mouth every day. 90 Tablet 3    tadalafil (CIALIS) 5 MG tablet Take 1 Tablet by mouth every day. 90 Tablet 1    Empagliflozin (JARDIANCE) 10 MG Tab tablet Take 1 Tablet by mouth every day. 90 Tablet 3    omeprazole (PRILOSEC) 20 MG delayed-release capsule Take 1 Capsule by mouth every day. 90 Capsule 3    levothyroxine (SYNTHROID) 75 MCG Tab Take 1 Tablet by mouth every day. 90 Tablet 3    lisinopril (PRINIVIL) 40 MG tablet Take 1 Tablet by mouth every day. 90 Tablet 3    Alirocumab 150 MG/ML Solution Auto-injector Inject 150 mg under the skin every 14 days. Indications: High Amount of Fats in the Blood 6 Each 3    carvedilol (COREG) 12.5 MG Tab TAKE 1 TABLET BY MOUTH TWICE A DAY WITH MEALS 180 Tablet 3    ciclopirox (LOPROX) 0.77 % cream Apply to feet twice a day for 2-3 weeks      GEMTESA 75 MG Tab Take 1 Tablet by mouth every day. FOR 30 DAYS      Alpha-Lipoic Acid 600 MG Tab Take 600 mg by mouth 2 times a day.      docusate sodium (COLACE) 100 MG Cap TAKE 1 CAP BY MOUTH 2 TIMES A DAY *PAYS CASH* 180 Cap 1    FEXOFENADINE HCL PO Take  by mouth as needed.      aspirin (ASA) 81 MG Chew Tab chewable tablet Chew 81 mg every day. 100 Tab 11     No facility-administered encounter medications on file as of 3/20/2024.     ROS           Objective     /54 (BP Location: Left arm, Patient Position:  "Sitting, BP Cuff Size: Adult)   Pulse 84   Resp 16   Ht 1.803 m (5' 11\")   Wt 112 kg (246 lb)   SpO2 96%   BMI 34.31 kg/m²     Physical Exam       We reviewed in person the most recent labs  Recent Results (from the past 5040 hour(s))   PROSTATE SPECIFIC AG DIAGNOSTIC    Collection Time: 10/11/23  9:20 AM   Result Value Ref Range    Prostatic Specific Antigen Tot 9.33 (H) 0.00 - 4.00 ng/mL   Comp Metabolic Panel    Collection Time: 12/14/23  9:20 AM   Result Value Ref Range    Sodium 137 135 - 145 mmol/L    Potassium 4.3 3.6 - 5.5 mmol/L    Chloride 101 96 - 112 mmol/L    Co2 22 20 - 33 mmol/L    Anion Gap 14.0 7.0 - 16.0    Glucose 155 (H) 65 - 99 mg/dL    Bun 29 (H) 8 - 22 mg/dL    Creatinine 0.99 0.50 - 1.40 mg/dL    Calcium 10.8 (H) 8.5 - 10.5 mg/dL    Correct Calcium 10.2 8.5 - 10.5 mg/dL    AST(SGOT) 15 12 - 45 U/L    ALT(SGPT) 36 2 - 50 U/L    Alkaline Phosphatase 86 30 - 99 U/L    Total Bilirubin 0.6 0.1 - 1.5 mg/dL    Albumin 4.8 3.2 - 4.9 g/dL    Total Protein 7.6 6.0 - 8.2 g/dL    Globulin 2.8 1.9 - 3.5 g/dL    A-G Ratio 1.7 g/dL   Lipid Profile    Collection Time: 12/14/23  9:20 AM   Result Value Ref Range    Cholesterol,Tot 143 100 - 199 mg/dL    Triglycerides 119 0 - 149 mg/dL    HDL 56 >=40 mg/dL    LDL 63 <100 mg/dL   CBC WITHOUT DIFFERENTIAL    Collection Time: 12/14/23  9:20 AM   Result Value Ref Range    WBC 12.5 (H) 4.8 - 10.8 K/uL    RBC 5.63 4.70 - 6.10 M/uL    Hemoglobin 17.3 14.0 - 18.0 g/dL    Hematocrit 51.7 42.0 - 52.0 %    MCV 91.8 81.4 - 97.8 fL    MCH 30.7 27.0 - 33.0 pg    MCHC 33.5 32.3 - 36.5 g/dL    RDW 46.7 35.9 - 50.0 fL    Platelet Count 227 164 - 446 K/uL    MPV 11.3 9.0 - 12.9 fL   TSH    Collection Time: 12/14/23  9:20 AM   Result Value Ref Range    TSH 1.310 0.380 - 5.330 uIU/mL   VITAMIN B12    Collection Time: 12/14/23  9:20 AM   Result Value Ref Range    Vitamin B12 -True Cobalamin 476 211 - 911 pg/mL   HEMOGLOBIN A1C    Collection Time: 12/14/23  9:20 AM   Result " Value Ref Range    Glycohemoglobin 6.3 (H) 4.0 - 5.6 %    Est Avg Glucose 134 mg/dL   MICROALBUMIN CREAT RATIO URINE    Collection Time: 12/14/23  9:20 AM   Result Value Ref Range    Creatinine, Urine 86.31 mg/dL    Microalbumin, Urine Random 4.2 mg/dL    Micro Alb Creat Ratio 49 (H) 0 - 30 mg/g   VITAMIN D,25 HYDROXY (DEFICIENCY)    Collection Time: 12/14/23  9:20 AM   Result Value Ref Range    25-Hydroxy   Vitamin D 25 19 (L) 30 - 100 ng/mL   ESTIMATED GFR    Collection Time: 12/14/23  9:20 AM   Result Value Ref Range    GFR (CKD-EPI) 79 >60 mL/min/1.73 m 2           Assessment & Plan     1. Ischemic heart disease due to coronary artery obstruction (HCC)  clopidogrel (PLAVIX) 75 MG Tab      2. Stented coronary artery  clopidogrel (PLAVIX) 75 MG Tab      3. Dyslipidemia        4. Coronary arteriosclerosis due to lipid rich plaque            Medical Decision Making: Today's Assessment/Status/Plan:          It was my pleasure to meet with Mr. Steele.    We addressed the management of hypertension at today's visit. Blood pressure is well controlled.  We specifically assessed the labs on hypertension treatment    We addressed the management of dyslipidemia and atherosclerosis at today's visit. He is on appropriate statin.    We addressed the management of atherosclerotic artery disease.  He is on proper antiplatelet, cholesterol management and beta-blockers as appropriate.  We addressed the potential side effects and laboratory follow-up for these medications.  Plavix monotherapy      I will see Mr. Steele back in 1 year time and encouraged him to follow up with us over the phone or electronically using my MyChart as issues arise.    It is my pleasure to participate in the care of Mr. Steele.  Please do not hesitate to contact me with questions or concerns.    Raj Olvera MD PhD FAC  Cardiologist [facility]    Please note that this dictation was created using voice  recognition software. There may be errors I did not discover before finalizing the note.     () Today's E/M visit is associated with medical care services that serve as the continuing focal point for all needed health care services and/or with medical care services that  are part of ongoing care related to a patient's single, serious condition, or a complex condition: This includes  furnishing services to patients on an ongoing basis that result in care that is personalized  to the patient. The services result in a comprehensive, longitudinal, and continuous  relationship with the patient and involve delivery of team-based care that is accessible, coordinated with other practitioners and providers, and integrated with the broader health  care landscape.

## 2024-03-20 NOTE — PATIENT INSTRUCTIONS
A - Antiplatelet - Clopidogrel (PLAVIX) reduces your risk of cardiac event by 27% compared to Aspirin 81 mg daily (HOST EXAM study 2021), prasrugrel (EFFIENT), ticagrelor (BRILINTA)) may be used for the first year.  Aspirin 81 mg daily is associated with a 20% less use of heart event and is used the first year after a cardiac event, stent or CABG.  B - Blood Pressure Control - reduces your risk or heart attack and stroke, the goal is <130/80.  C - Cholesterol Management - statins dramatically reduce your risk; for those that are intolerant to statins, there are alternatives.  D - Diet - MEDITERRANEAN DIET or Cardiac rehab diets, Cardiosmart.org.  E - Exercise - at least 2.5 hours of moderate exercise weekly  (typical brisk walking or similar activity).  F - Fats - VASCEPA, or EPA Fish oil (if Vascepa too expensive) for elevated triglycerides (REDUCE IT trial showed reduction from 22% 5 year MACE to 17%).  G - Good Vibes - meditation, exercise, yoga, Pilates, mindfulness, Larry-Chi, stress reduction.  H - Heart Failure - betablockers, sucubatril (ENTRESTO) 16% less risk of dying over 3 years, spironolactone, empagliflozin (JARDIANCE) 17% less risk of dying over 2 years, CRT +/- ICD.  I - Inflammation - Colchicine in the LoDoCo2 study in 2020 reduced the risk of heart attack by 30% in 2.5 year follow up.  R - Rehab - Cardiac Rehab reduces risk of dying by 13-24% and need to go to the hospital by 30% within the first year. Compared to regular Cardiac Rehab, Intensive Cardiac Rehab ([place] [facility]) was shown to reduce the risk of major events 17 to 11% and hospitalization for CHF from 8% to 2%. (Nutrients [id number](19):9914)  S - Smoking - never smoke, if you do smoke ask for help to quit for good. Patients who quit smoking after heart attack have 36% less likely risk of dying.  Resources are 1-800-QUIT-NOW Hotelogix in addition to Chantix, bupropion (Zyban) or nicotine replacement  T - Type II Diabetes  - pills empagliflozin (JARDIANCE) 38% less risk of dying over 4 years, and/or weekly injections: tirzepatide (Mounjaro), semaglutide (Ozempic), liraglutide (Victoza), dulaglutide (Trulicity) ~26% less risk of MACE in 2 years.  V - Vaccines - Annual flu shot and COVID vaccine reduces the risk of serious cardiovascular complications from these deadly infections.  W - Weight - maintain a healthy weight. Semaglutide (WEGOVY) weekly injection was shown to reduce weight by 10% and heart events by 20% for patients with CAD and BMI > 27 in the SELECT trial (6.5% vs 8% in 48 month follow up [facility] 12/2023).      Work on at least 2.5 - 5 hours a week of moderate exercise    Please look into the following diets and incorporate them into your diet  LOW SALT DIET   KEEP YOUR SODIUM EQUAL TO CALORIES AND NO MORE THAN DOUBLE THE CALORIES FOR A LOW SALT DIET    Cardiosmart.org - great resource for American College of Cardiology on heart disease prevention and treatment    FOR TREATMENT OR PREVENTION OF CORONARY ARTERY DISEASE  These three programs are approved by Medicare/Insurers for those with heart disease  [facility]  Dr. Rodríguez's Program for Reversing Heart Disease - Marco A Palacios's Cardiologist vegetarian-based  [facility] Cardiac Wellness Program - [place]-based mind-body Program    Mediterranean Diet has been shown to be a hearty healthy diet.    This is a commonly referenced Program  Dr Dumont - [facility] over [place] (book and documentary) - vegetarian-based    FOR TREATMENT OF BLOOD PRESSURE  DASH DIET - American Heart Association for treatment of HYPERTENSION    FOR TREATMENT OF BAD CHOLESTEROL/FATS  REDUCE PROCESSED SUGAR AS MUCH AS POSSIBLE  INCREASE WHOLE GRAINS/VEGETABLES  INCREASE FIBER    Lowering total cholesterol and LDL (bad) cholesterol:  - Eat leaner cuts of meat, or eliminate altogether if possible red meat, and frequently substitute fish or chicken.  - Limit saturated  fat to no more than 7-10% of total calories no more than 10 g per day is recommended. Some sources of saturated fat include butter, animal fats, hydrogenated vegetable fats and oils, many desserts, whole milk dairy products.  - Replaced saturated fats with polyunsaturated fats and monounsaturated fats. Foods high in monounsaturated fat include nuts, canola oil, avocados, and olives.  - Limit trans fat (processed foods) and replaced with fresh fruits and vegetables  - Recommend nonfat dairy products  - Increase substantially the amount of soluble fiber intake (legumes such as beans, fruit, whole grains).  - Consider nutritional supplements: plant sterile spreads such as Benecol, fish oil,  flaxseed oil, omega-3 acids EPA capsules 2000 mg twice a day, or viscous fiber such as Metamucil  - Attain ideal weight and regular exercise (at least 30 minutes per day of moderate exercise)  ASK ABOUT STATIN OR NON STATIN MEDICATION TO REDUCE YOUR LDL AND HEART RISK    Lowering triglycerides:  - Reduce intake of simple sugar: Desserts, candy, pastries, honey, sodas, sugared cereals, yogurt, Gatorade, sports bars, canned fruit, smoothies, fruit juice, coffee drinks  - Reduced intake of refined starches: Refined Pasta, most bread  - Reduce or abstain from alcohol  - Increase omega-3 fatty acids: Gleason, Trout, Mackerel, Herring, Albacore tuna and supplements  - Attain ideal weight and regular exercise (at least 30 minutes per day of moderate exercise)  ASK ABOUT PURIFIED OMEGA 3 EPA or FISH OIL TO REDUCE YOUR TG AND HEART RISK    Elevating HDL (good) cholesterol:  - Increase physical activity  - Increase omega-3 fatty acids and supplements as listed above  - Incorporating appropriate amounts of monounsaturated fats such as nuts, olive oil, canola oil, avocados, olives  - Stop smoking  - Attain ideal weight and regular exercise (at least 30 minutes per day of moderate exercise)

## 2024-04-02 RX ORDER — MELOXICAM 7.5 MG/1
TABLET ORAL
Qty: 180 TABLET | Refills: 0 | Status: SHIPPED | OUTPATIENT
Start: 2024-04-02

## 2024-04-19 DIAGNOSIS — N13.8 BENIGN PROSTATIC HYPERPLASIA WITH URINARY OBSTRUCTION: Chronic | ICD-10-CM

## 2024-04-19 DIAGNOSIS — N40.1 BENIGN PROSTATIC HYPERPLASIA WITH URINARY OBSTRUCTION: Chronic | ICD-10-CM

## 2024-04-19 RX ORDER — TADALAFIL 5 MG/1
5 TABLET ORAL DAILY
Qty: 90 TABLET | Refills: 1 | Status: SHIPPED | OUTPATIENT
Start: 2024-04-19

## 2024-05-01 ENCOUNTER — HOSPITAL ENCOUNTER (OUTPATIENT)
Facility: MEDICAL CENTER | Age: 79
End: 2024-05-01
Attending: PHYSICIAN ASSISTANT
Payer: MEDICARE

## 2024-05-01 ENCOUNTER — NON-PROVIDER VISIT (OUTPATIENT)
Dept: INTERNAL MEDICINE | Facility: IMAGING CENTER | Age: 79
End: 2024-05-01
Payer: MEDICARE

## 2024-05-01 LAB — PSA SERPL-MCNC: 12.5 NG/ML (ref 0–4)

## 2024-05-01 PROCEDURE — 99999 PR NO CHARGE: CPT

## 2024-05-09 NOTE — PROGRESS NOTES
Chief Complaint   Patient presents with   • Annual Wellness Visit         HPI:  Myke is a 72 y.o. established male patient here for Medicare Annual Wellness Visit he is generally doing well.  He does have some right knee pain and has been doing some physical therapy which is slowly helping.      He did have recent lab work done.  We have been following his A1c which is slowly been increasing.  His A1c is now 6.9.  He does have coronary artery disease and is followed by Dr. Olvera.  He has been unable to tolerate statins.  He does have a follow-up appointment with him in November.    He is complaining of some changes in his bowels.  He states that his bowel movements are thinner and he seems more bloated.  He did have a colonoscopy in 2014.  Denies any blood or dark stools.      Patient Active Problem List    Diagnosis Date Noted   • BMI 38.0-38.9,adult 08/31/2018   • Type 2 diabetes mellitus without complication, without long-term current use of insulin ([facility]) 08/31/2018   • Hypothyroid 12/28/2017   • Vascular claudication ([facility]) 11/30/2016   • Neuropathy ([facility]) 11/30/2016   • Essential hypertension 03/19/2016   • Foot drop, bilateral 03/18/2016   • Thyroid activity decreased 09/23/2015   • Constipation 05/04/2015   • Lumbosacral spondylosis without myelopathy 04/29/2015   • DDD (degenerative disc disease), lumbar 12/31/2014   • Spinal stenosis of lumbar region 12/31/2014   • Leukocytosis 09/05/2014   • Obesity 08/28/2014   • Coronary arteriosclerosis due to lipid rich plaque    • Stented coronary artery 08/09/2012   • BPH (benign prostatic hypertrophy) with urinary obstruction 08/09/2012   • Dyslipidemia 08/09/2012       Current Outpatient Prescriptions   Medication Sig Dispense Refill   • tadalafil (CIALIS) 5 MG tablet Take 1 Tab by mouth every day. 90 Tab 1   • carvedilol (COREG) 12.5 MG Tab Take 1 Tab by mouth 2 times a day, with meals. 180 Tab 3   • levothyroxine (SYNTHROID) 75 MCG Tab TAKE 1 TAB BY MOUTH EVERY  Left message for patient to call care team.    DAY. 90 Tab 3   • lisinopril (PRINIVIL, ZESTRIL) 40 MG tablet TAKE 1 TAB BY MOUTH EVERY DAY. 90 Tab 3   • docusate sodium 100 MG Cap Take 100 mg by mouth 2 Times a Day. 180 Cap 1   • Flaxseed, Linseed, (FLAX SEEDS PO) Take  by mouth.     • finasteride (PROSCAR) 5 MG Tab Take 5 mg by mouth every day.     • amlodipine (NORVASC) 5 MG Tab Take 1 Tab by mouth every day. 90 Tab 3   • Coenzyme Q10 (CO Q-10) 100 MG Cap Take 200 mg by mouth every day.     • Alpha-Lipoic Acid 600 MG Cap Take 600 mg by mouth every day.     • LevOCARNitine (CARNITINE, L,) Powder 3 g by Does not apply route every day.     • aspirin (ASA) 81 MG Chew Tab chewable tablet Take 243 mg by mouth every day. 100 Tab 11   • triamcinolone acetonide (KENALOG) 0.1 % Cream Apply 1 Application to affected area(s) 2 times a day. 45 g 2   • FEXOFENADINE HCL PO Take  by mouth.       No current facility-administered medications for this visit.         The patient reports adherence to this regimen   Current supplements as per medication list.   Chronic narcotic pain medicines: no     Allergies: Patient has no known allergies.    Current social contact/activities: Active with family, friends, travel  Exercise: Yes  Is patient current with immunizations?  yes       He  reports that he has never smoked. He has never used smokeless tobacco. He reports that he drinks alcohol. He reports that he does not use drugs.        DPA/Advanced directive: .has an advanced directive - a copy HAS NOT been provided.                     ROS:    Gait: Uses a cane   Ostomy: no   Other tubes: no   Amputations: no   Chronic oxygen use no   Last eye exam: Within the past year  : Denies incontinence.       Screening:    Depression Screening    Little interest or pleasure in doing things?  0 - not at all  Feeling down, depressed , or hopeless? 0 - not at all  Patient Health Questionnaire Score: 0     If depressive symptoms identified deferred to follow up visit unless specifically  addressed in assessment and plan.    Screening for Cognitive Impairment    Three Minute Recall (leader, season, table) 3/3    Edwin clock face with all 12 numbers and set the hands to show 10 past 11.  Yes    Cognitive concerns identified deferred for follow up unless specifically addressed in assessment and plan.    Fall Risk Assessment    Has the patient had two or more falls in the last year or any fall with injury in the last year?  No    Safety Assessment    Throw rugs on floor.  No  Handrails on all stairs.  No  Good lighting in all hallways.  Yes  Difficulty hearing.  No  Patient counseled about all safety risks that were identified.    Functional Assessment ADLs    Are there any barriers preventing you from cooking for yourself or meeting nutritional needs?  No.    Are there any barriers preventing you from driving safely or obtaining transportation?  No.    Are there any barriers preventing you from using a telephone or calling for help?  No.    Are there any barriers preventing you from shopping?  No.    Are there any barriers preventing you from taking care of your own finances?  No.    Are there any barriers preventing you from managing your medications?  No.    Are there any barriers preventing you from showering, bathing or dressing yourself?  No.    Are you currently engaging in any exercise or physical activity?  Yes.  Stretching, recub bike, inversion table daily  What is your perception of your health?  Good.             Patient Care Team:  Shelly George M.D. as PCP - General (Family Medicine)  Anaya Figueroa R.N. as Registered Nurse  Neurology:  Dr. Leavitt  Urology:  Dr. Rocha  Card:  Dr. Ovlera  PT:  [facility]      Social History   Substance Use Topics   • Smoking status: Never Smoker   • Smokeless tobacco: Never Used   • Alcohol use Yes      Comment: 2-3/week     Family History   Problem Relation Age of Onset   • Heart Failure Father    • Heart Disease Father         valvular   •  "Hypertension Mother    • Hyperlipidemia Mother    • Hyperlipidemia Brother    • Cancer Brother         skin     He  has a past medical history of Anesthesia (04/07/15); Benign localized hyperplasia of prostate with urinary obstruction and other lower urinary tract symptoms (LUTS)(600.21) (8/9/2012); Benign prostatic hypertrophy; Coronary arteriosclerosis due to lipid rich plaque; Coronary atherosclerosis of native coronary artery (8/9/2012); Essential hypertriglyceridemia (8/9/2012); High cholesterol; Hypercholesterolemia (8/9/2012); Hypertension; Lumbar spinal stenosis; Obesity (8/28/2014); Pain (04/07/15); Stented coronary artery (8/9/2012); Unspecified cataract; and Unspecified disorder of thyroid. He also has no past medical history of Cold or Dental disorder.   Past Surgical History:   Procedure Laterality Date   • LUMBAR LAMINECTOMY DISKECTOMY  4/29/2015    Performed by Juan Pablo Betts M.D. at [facility]   • OTHER CARDIAC SURGERY  2008    2 Cardiac stents   • STAPEDECTOMY      bilateral ears; only worked on the R ear     REVIEW OF SYSTEMS:  GENERAL: No fatigue, no weight loss.  HEENT:  Ears--no earache, no change in hearing, no dizziness, no tinnitus.                 Eyes--no blurred vision, no discharge or pain                 Throat--No sore throat, no dysphagia, no hoarseness  CV:  No chest pain,dyspnea,palpitations or edema.  RESP:  No sob,cough,wheezing or hemoptysis.  GI: As above.  Occasional heartburn.  :  No dysuria, polyuria, hematuria, incontinence, or nocturia.  MS: Right knee pain, improving with physical therapy.  NEURO:  No seizures, syncope, paralysis, tremor.  He does have some mild weakness in both legs but he believes this is improving.  SKIN: No new or concerning skin lesions or changes.   PSYCH: Mood fine.        Exam:     Blood pressure 120/84, pulse 85, temperature 36.2 °C (97.2 °F), resp. rate 16, height 1.803 m (5' 11\"), weight (!) 125.6 kg (277 lb), SpO2 94 %. Body mass " index is 38.63 kg/m².    Hearing good.  --aid in left ear  Dentition good  Alert, oriented in no acute distress.  Eye contact is good, speech goal directed, affect calm  PHYSICAL EXAM;  GENERAL;  Obese male, No acute distress.   HEENT:  Head normocephalic and atraumatic.    PERRLA, EOMI. No conjunctival injection, no icterus.     TM's normal, nasal mucosa and mouth with no abnormalities.    Oropharynx is clear with no lesions.  NECK: Supple, no adenopathy or thyromegaly.  CV: RR. Normal S1,S2. No murmur, gallop or rub.          No JVD, Carotid pulses 2+ and sym. No bruits.  LUNGS:  Clear, no wheezes, rales or rhonchi.  BREASTS: Symmetric, no masses or tenderness. No nipple discharge.  AXILLA:  No masses or tenderness.  ABDOMEN: Soft, NT, nondistended. Bowel sounds normal. No hepatospenomegaly or masses. No rebound or guarding. No hernias.  EXTREMITIES:  No edema.   NEURO:  CN II-XII intact. Motor and sensation grossly intact.   SKIN: No rashes or abnormal lesions.        Assessment and Plan. The following treatment and monitoring plan is recommended:    1. Medicare annual wellness visit, subsequent.  Following diagnoses are all addressed.    2. Type 2 diabetes mellitus without complication, without long-term current use of insulin (HCC).  He does have type 2 diabetes.  Will start metformin 1000 mg every evening.  Discussed diet, exercise.  Encouraged diabetes wellness teaching which he declines.  He will work on weight loss.  He is on an ACE inhibitor, does not tolerate statins.  He is taking an aspirin.  Education regarding foot and eye care.  He will follow-up in 3 months.    3. Hypothyroidism, unspecified type.  Continue replacement therapy.    4. DDD (degenerative disc disease), lumbar.  Stable.    5. BMI 38.0-38.9,adult.  Counseled regarding diet and exercise.    6. BPH (benign prostatic hypertrophy) with urinary obstruction.  Continue present medications and follow-up with urology    7. Coronary  arteriosclerosis due to lipid rich plaque.  He will continue his medications.  He does have a follow-up with Dr. Olvera.  Encouraged him to discuss injectable lipid-lowering medications.    8. Stented coronary artery     9. Dyslipidemia     10. Bowel habit changes.  Referral to GI. REFERRAL TO GASTROENTEROLOGY   11. Essential hypertension.  Controlled.    12. Healthcare Maintenance. Counseled re: nutrition, activity and safety. Reviewed immunizations.         Services needed: No services needed at this time  Health Care Screening recommendations as per orders if indicated.  Referrals offered: PT/OT/Nutrition counseling/Behavioral Health/Smoking cessation as per orders if indicated.    Discussion today about general wellness and lifestyle habits:    · Prevent falls and reduce trip hazards; Cautioned about securing or removing rugs.  · Have a working fire alarm and carbon monoxide detector;   · Engage in regular physical activity and social activities   ·       Follow-up: 3 months and prn.

## 2024-05-21 ENCOUNTER — APPOINTMENT (RX ONLY)
Dept: URBAN - METROPOLITAN AREA CLINIC 6 | Facility: CLINIC | Age: 79
Setting detail: DERMATOLOGY
End: 2024-05-21

## 2024-05-21 DIAGNOSIS — L81.4 OTHER MELANIN HYPERPIGMENTATION: ICD-10-CM

## 2024-05-21 DIAGNOSIS — D22 MELANOCYTIC NEVI: ICD-10-CM

## 2024-05-21 DIAGNOSIS — L82.1 OTHER SEBORRHEIC KERATOSIS: ICD-10-CM

## 2024-05-21 DIAGNOSIS — L72.8 OTHER FOLLICULAR CYSTS OF THE SKIN AND SUBCUTANEOUS TISSUE: ICD-10-CM

## 2024-05-21 DIAGNOSIS — D18.0 HEMANGIOMA: ICD-10-CM

## 2024-05-21 DIAGNOSIS — Z71.89 OTHER SPECIFIED COUNSELING: ICD-10-CM

## 2024-05-21 DIAGNOSIS — B35.3 TINEA PEDIS: ICD-10-CM | Status: STABLE

## 2024-05-21 DIAGNOSIS — L57.0 ACTINIC KERATOSIS: ICD-10-CM

## 2024-05-21 DIAGNOSIS — Z85.828 PERSONAL HISTORY OF OTHER MALIGNANT NEOPLASM OF SKIN: ICD-10-CM

## 2024-05-21 PROBLEM — C44.41 BASAL CELL CARCINOMA OF SKIN OF SCALP AND NECK: Status: ACTIVE | Noted: 2024-05-21

## 2024-05-21 PROBLEM — D22.5 MELANOCYTIC NEVI OF TRUNK: Status: ACTIVE | Noted: 2024-05-21

## 2024-05-21 PROBLEM — D18.01 HEMANGIOMA OF SKIN AND SUBCUTANEOUS TISSUE: Status: ACTIVE | Noted: 2024-05-21

## 2024-05-21 PROCEDURE — ? PRESCRIPTION MEDICATION MANAGEMENT

## 2024-05-21 PROCEDURE — ? LIQUID NITROGEN

## 2024-05-21 PROCEDURE — 99214 OFFICE O/P EST MOD 30 MIN: CPT | Mod: 25

## 2024-05-21 PROCEDURE — ? SUNSCREEN RECOMMENDATIONS

## 2024-05-21 PROCEDURE — 17000 DESTRUCT PREMALG LESION: CPT

## 2024-05-21 PROCEDURE — ? COUNSELING

## 2024-05-21 PROCEDURE — 17003 DESTRUCT PREMALG LES 2-14: CPT

## 2024-05-21 PROCEDURE — ? DEFER

## 2024-05-21 PROCEDURE — ? OBSERVATION

## 2024-05-21 PROCEDURE — ? SUNSCREEN TREATMENT REGIMEN

## 2024-05-21 ASSESSMENT — LOCATION ZONE DERM
LOCATION ZONE: NECK
LOCATION ZONE: ARM
LOCATION ZONE: SCALP
LOCATION ZONE: LEG
LOCATION ZONE: TRUNK
LOCATION ZONE: HAND

## 2024-05-21 ASSESSMENT — LOCATION SIMPLE DESCRIPTION DERM
LOCATION SIMPLE: RIGHT OCCIPITAL SCALP
LOCATION SIMPLE: CHEST
LOCATION SIMPLE: SCALP
LOCATION SIMPLE: RIGHT ANKLE
LOCATION SIMPLE: RIGHT HAND
LOCATION SIMPLE: LEFT HAND
LOCATION SIMPLE: LEFT FOREARM
LOCATION SIMPLE: UPPER BACK
LOCATION SIMPLE: POSTERIOR SCALP
LOCATION SIMPLE: POSTERIOR NECK
LOCATION SIMPLE: ABDOMEN

## 2024-05-21 ASSESSMENT — LOCATION DETAILED DESCRIPTION DERM
LOCATION DETAILED: LEFT RADIAL DORSAL HAND
LOCATION DETAILED: RIGHT ANKLE
LOCATION DETAILED: SUPERIOR THORACIC SPINE
LOCATION DETAILED: STERNUM
LOCATION DETAILED: LEFT POSTERIOR NECK
LOCATION DETAILED: RIGHT ULNAR DORSAL HAND
LOCATION DETAILED: MID-OCCIPITAL SCALP
LOCATION DETAILED: EPIGASTRIC SKIN
LOCATION DETAILED: PERIUMBILICAL SKIN
LOCATION DETAILED: RIGHT SUPERIOR OCCIPITAL SCALP
LOCATION DETAILED: LEFT PROXIMAL DORSAL FOREARM
LOCATION DETAILED: RIGHT SUPERIOR PARIETAL SCALP

## 2024-05-21 NOTE — PROCEDURE: LIQUID NITROGEN
Render Note In Bullet Format When Appropriate: No
Post-Care Instructions: I reviewed with the patient in detail post-care instructions. Patient is to wear sunprotection, and avoid picking at any of the treated lesions. Pt may apply Vaseline to crusted or scabbing areas.
Duration Of Freeze Thaw-Cycle (Seconds): 10
Show Aperture Variable?: Yes
Consent: The patient's consent was obtained including but not limited to risks of crusting, scabbing, blistering, scarring, darker or lighter pigmentary change, recurrence, incomplete removal and infection.
Detail Level: Detailed
Number Of Freeze-Thaw Cycles: 2 freeze-thaw cycles

## 2024-05-31 ENCOUNTER — NON-PROVIDER VISIT (OUTPATIENT)
Dept: INTERNAL MEDICINE | Facility: IMAGING CENTER | Age: 79
End: 2024-05-31
Payer: MEDICARE

## 2024-05-31 ENCOUNTER — HOSPITAL ENCOUNTER (OUTPATIENT)
Facility: MEDICAL CENTER | Age: 79
End: 2024-05-31
Attending: PHYSICIAN ASSISTANT
Payer: MEDICARE

## 2024-05-31 ENCOUNTER — HOSPITAL ENCOUNTER (OUTPATIENT)
Facility: MEDICAL CENTER | Age: 79
End: 2024-05-31
Attending: FAMILY MEDICINE
Payer: MEDICARE

## 2024-05-31 DIAGNOSIS — Z79.899 MEDICATION MANAGEMENT: ICD-10-CM

## 2024-05-31 LAB
25(OH)D3 SERPL-MCNC: 18 NG/ML (ref 30–100)
ALBUMIN SERPL BCP-MCNC: 4.6 G/DL (ref 3.2–4.9)
ALBUMIN/GLOB SERPL: 2.3 G/DL
ALP SERPL-CCNC: 86 U/L (ref 30–99)
ALT SERPL-CCNC: 40 U/L (ref 2–50)
ANION GAP SERPL CALC-SCNC: 14 MMOL/L (ref 7–16)
AST SERPL-CCNC: 21 U/L (ref 12–45)
BILIRUB SERPL-MCNC: 0.9 MG/DL (ref 0.1–1.5)
BUN SERPL-MCNC: 21 MG/DL (ref 8–22)
BUN SERPL-MCNC: 21 MG/DL (ref 8–22)
CALCIUM ALBUM COR SERPL-MCNC: 9.8 MG/DL (ref 8.5–10.5)
CALCIUM SERPL-MCNC: 10.3 MG/DL (ref 8.5–10.5)
CHLORIDE SERPL-SCNC: 102 MMOL/L (ref 96–112)
CHOLEST SERPL-MCNC: 129 MG/DL (ref 100–199)
CO2 SERPL-SCNC: 23 MMOL/L (ref 20–33)
CREAT SERPL-MCNC: 0.87 MG/DL (ref 0.5–1.4)
CREAT SERPL-MCNC: 0.87 MG/DL (ref 0.5–1.4)
ERYTHROCYTE [DISTWIDTH] IN BLOOD BY AUTOMATED COUNT: 46.2 FL (ref 35.9–50)
EST. AVERAGE GLUCOSE BLD GHB EST-MCNC: 126 MG/DL
GFR SERPLBLD CREATININE-BSD FMLA CKD-EPI: 89 ML/MIN/1.73 M 2
GFR SERPLBLD CREATININE-BSD FMLA CKD-EPI: 89 ML/MIN/1.73 M 2
GLOBULIN SER CALC-MCNC: 2 G/DL (ref 1.9–3.5)
GLUCOSE SERPL-MCNC: 125 MG/DL (ref 65–99)
HBA1C MFR BLD: 6 % (ref 4–5.6)
HCT VFR BLD AUTO: 51.5 % (ref 42–52)
HDLC SERPL-MCNC: 53 MG/DL
HGB BLD-MCNC: 16.7 G/DL (ref 14–18)
LDLC SERPL CALC-MCNC: 40 MG/DL
MCH RBC QN AUTO: 30.4 PG (ref 27–33)
MCHC RBC AUTO-ENTMCNC: 32.4 G/DL (ref 32.3–36.5)
MCV RBC AUTO: 93.8 FL (ref 81.4–97.8)
PLATELET # BLD AUTO: 162 K/UL (ref 164–446)
PMV BLD AUTO: 11.5 FL (ref 9–12.9)
POTASSIUM SERPL-SCNC: 4.1 MMOL/L (ref 3.6–5.5)
PROT SERPL-MCNC: 6.6 G/DL (ref 6–8.2)
RBC # BLD AUTO: 5.49 M/UL (ref 4.7–6.1)
SODIUM SERPL-SCNC: 139 MMOL/L (ref 135–145)
TRIGL SERPL-MCNC: 180 MG/DL (ref 0–149)
TSH SERPL DL<=0.005 MIU/L-ACNC: 4.68 UIU/ML (ref 0.38–5.33)
VIT B12 SERPL-MCNC: 471 PG/ML (ref 211–911)
WBC # BLD AUTO: 9.1 K/UL (ref 4.8–10.8)

## 2024-05-31 PROCEDURE — 84520 ASSAY OF UREA NITROGEN: CPT | Mod: XU

## 2024-05-31 PROCEDURE — 83036 HEMOGLOBIN GLYCOSYLATED A1C: CPT

## 2024-05-31 PROCEDURE — 85027 COMPLETE CBC AUTOMATED: CPT

## 2024-05-31 PROCEDURE — 82607 VITAMIN B-12: CPT

## 2024-05-31 PROCEDURE — 80061 LIPID PANEL: CPT

## 2024-05-31 PROCEDURE — 80053 COMPREHEN METABOLIC PANEL: CPT

## 2024-05-31 PROCEDURE — 82565 ASSAY OF CREATININE: CPT | Mod: XU

## 2024-05-31 PROCEDURE — 84443 ASSAY THYROID STIM HORMONE: CPT

## 2024-05-31 PROCEDURE — 82306 VITAMIN D 25 HYDROXY: CPT

## 2024-06-04 ENCOUNTER — PATIENT MESSAGE (OUTPATIENT)
Dept: INTERNAL MEDICINE | Facility: IMAGING CENTER | Age: 79
End: 2024-06-04
Payer: MEDICARE

## 2024-06-04 DIAGNOSIS — M65.331 TRIGGER MIDDLE FINGER OF RIGHT HAND: ICD-10-CM

## 2024-06-04 DIAGNOSIS — M65.332 TRIGGER MIDDLE FINGER OF LEFT HAND: ICD-10-CM

## 2024-06-14 ENCOUNTER — OFFICE VISIT (OUTPATIENT)
Dept: INTERNAL MEDICINE | Facility: IMAGING CENTER | Age: 79
End: 2024-06-14
Payer: MEDICARE

## 2024-06-14 VITALS
OXYGEN SATURATION: 95 % | BODY MASS INDEX: 34.45 KG/M2 | TEMPERATURE: 97.4 F | DIASTOLIC BLOOD PRESSURE: 80 MMHG | WEIGHT: 247 LBS | RESPIRATION RATE: 14 BRPM | SYSTOLIC BLOOD PRESSURE: 128 MMHG | HEART RATE: 77 BPM

## 2024-06-14 DIAGNOSIS — I25.9 ISCHEMIC HEART DISEASE DUE TO CORONARY ARTERY OBSTRUCTION (HCC): ICD-10-CM

## 2024-06-14 DIAGNOSIS — N13.8 BENIGN PROSTATIC HYPERPLASIA WITH URINARY OBSTRUCTION: Chronic | ICD-10-CM

## 2024-06-14 DIAGNOSIS — I10 ESSENTIAL HYPERTENSION: ICD-10-CM

## 2024-06-14 DIAGNOSIS — E78.1 HYPERTRIGLYCERIDEMIA: Chronic | ICD-10-CM

## 2024-06-14 DIAGNOSIS — Z00.00 MEDICARE ANNUAL WELLNESS VISIT, SUBSEQUENT: Primary | ICD-10-CM

## 2024-06-14 DIAGNOSIS — E03.9 HYPOTHYROIDISM, UNSPECIFIED TYPE: ICD-10-CM

## 2024-06-14 DIAGNOSIS — I24.0 ISCHEMIC HEART DISEASE DUE TO CORONARY ARTERY OBSTRUCTION (HCC): ICD-10-CM

## 2024-06-14 DIAGNOSIS — Z95.5 STENTED CORONARY ARTERY: ICD-10-CM

## 2024-06-14 DIAGNOSIS — E11.59 TYPE 2 DIABETES MELLITUS WITH OTHER CIRCULATORY COMPLICATION, WITHOUT LONG-TERM CURRENT USE OF INSULIN (HCC): Chronic | ICD-10-CM

## 2024-06-14 DIAGNOSIS — I25.10 CORONARY ATHEROSCLEROSIS DUE TO LIPID RICH PLAQUE: Chronic | ICD-10-CM

## 2024-06-14 DIAGNOSIS — I25.83 CORONARY ATHEROSCLEROSIS DUE TO LIPID RICH PLAQUE: Chronic | ICD-10-CM

## 2024-06-14 DIAGNOSIS — N40.1 BENIGN PROSTATIC HYPERPLASIA WITH URINARY OBSTRUCTION: Chronic | ICD-10-CM

## 2024-06-14 DIAGNOSIS — E78.5 DYSLIPIDEMIA: ICD-10-CM

## 2024-06-14 DIAGNOSIS — K59.00 CONSTIPATION, UNSPECIFIED CONSTIPATION TYPE: ICD-10-CM

## 2024-06-14 DIAGNOSIS — E11.9 TYPE 2 DIABETES MELLITUS WITHOUT COMPLICATION, WITHOUT LONG-TERM CURRENT USE OF INSULIN (HCC): ICD-10-CM

## 2024-06-14 DIAGNOSIS — R97.20 HIGH PROSTATE SPECIFIC ANTIGEN (PSA): ICD-10-CM

## 2024-06-14 PROBLEM — M12.812 ROTATOR CUFF ARTHROPATHY OF LEFT SHOULDER: Status: ACTIVE | Noted: 2022-12-19

## 2024-06-14 PROBLEM — R39.15 BENIGN PROSTATIC HYPERPLASIA (BPH) WITH URINARY URGENCY: Status: ACTIVE | Noted: 2023-05-01

## 2024-06-14 PROBLEM — N39.46 MIXED STRESS AND URGE URINARY INCONTINENCE: Status: ACTIVE | Noted: 2023-05-01

## 2024-06-14 PROCEDURE — G0439 PPPS, SUBSEQ VISIT: HCPCS | Performed by: FAMILY MEDICINE

## 2024-06-14 PROCEDURE — 3079F DIAST BP 80-89 MM HG: CPT | Performed by: FAMILY MEDICINE

## 2024-06-14 PROCEDURE — 3074F SYST BP LT 130 MM HG: CPT | Performed by: FAMILY MEDICINE

## 2024-06-14 RX ORDER — ALFUZOSIN HYDROCHLORIDE 10 MG/1
TABLET, EXTENDED RELEASE ORAL
COMMUNITY
Start: 2024-06-05

## 2024-06-14 RX ORDER — LISINOPRIL 40 MG/1
40 TABLET ORAL DAILY
Qty: 90 TABLET | Refills: 3 | Status: SHIPPED | OUTPATIENT
Start: 2024-06-14

## 2024-06-14 RX ORDER — DOCUSATE SODIUM 100 MG/1
CAPSULE, LIQUID FILLED ORAL
Qty: 180 CAPSULE | Refills: 1 | Status: SHIPPED | OUTPATIENT
Start: 2024-06-14

## 2024-06-14 RX ORDER — CLOPIDOGREL BISULFATE 75 MG/1
75 TABLET ORAL DAILY
Qty: 90 TABLET | Refills: 3 | Status: SHIPPED | OUTPATIENT
Start: 2024-06-14

## 2024-06-14 RX ORDER — IMIQUIMOD 12.5 MG/.25G
CREAM TOPICAL
COMMUNITY
Start: 2024-05-29

## 2024-06-14 RX ORDER — BACLOFEN 10 MG/1
5-10 TABLET ORAL 3 TIMES DAILY PRN
Qty: 270 TABLET | Refills: 1 | Status: SHIPPED | OUTPATIENT
Start: 2024-06-14

## 2024-06-14 RX ORDER — LEVOTHYROXINE SODIUM 0.07 MG/1
75 TABLET ORAL
Qty: 90 TABLET | Refills: 3 | Status: SHIPPED | OUTPATIENT
Start: 2024-06-14

## 2024-06-14 RX ORDER — EMPAGLIFLOZIN 10 MG/1
10 TABLET, FILM COATED ORAL DAILY
Qty: 90 TABLET | Refills: 3 | Status: SHIPPED | OUTPATIENT
Start: 2024-06-14

## 2024-06-14 RX ORDER — AMLODIPINE BESYLATE 10 MG/1
10 TABLET ORAL DAILY
Qty: 90 TABLET | Refills: 3 | Status: SHIPPED | OUTPATIENT
Start: 2024-06-14

## 2024-06-14 RX ORDER — METFORMIN HYDROCHLORIDE 500 MG/1
1000 TABLET, EXTENDED RELEASE ORAL DAILY
Qty: 180 TABLET | Refills: 3 | Status: SHIPPED | OUTPATIENT
Start: 2024-06-14

## 2024-06-14 RX ORDER — CARVEDILOL 12.5 MG/1
12.5 TABLET ORAL 2 TIMES DAILY WITH MEALS
Qty: 180 TABLET | Refills: 3 | Status: SHIPPED | OUTPATIENT
Start: 2024-06-14

## 2024-06-14 ASSESSMENT — FIBROSIS 4 INDEX: FIB4 SCORE: 1.56

## 2024-06-14 ASSESSMENT — ACTIVITIES OF DAILY LIVING (ADL): BATHING_REQUIRES_ASSISTANCE: 0

## 2024-06-14 ASSESSMENT — ENCOUNTER SYMPTOMS: GENERAL WELL-BEING: GOOD

## 2024-06-14 ASSESSMENT — PATIENT HEALTH QUESTIONNAIRE - PHQ9: CLINICAL INTERPRETATION OF PHQ2 SCORE: 0

## 2024-06-14 NOTE — PROGRESS NOTES
Verbal consent was acquired by the patient to use [employer] ambient listening note generation during this visit     HPI:  Myke Steele is a 78 y.o. here for Medicare Annual Wellness Visit       History of Present Illness  The patient is a 79-year-old male who presents for evaluation of multiple medical concerns.    The patient expresses a desire to evaluate his right ear due to cerumen impaction. He utilized a kit for ear cleaning, expressing a desire to ensure complete removal.    The patient is currently under the care of a urologist and is scheduled for an MRI of his prostate on 06/29/2024 due to an enlarged prostate. He has undergone two biopsies and an MRI in 2021. His current medication regimen includes tadalafil, Gemtesa, and Uroxatral, which he reports as beneficial.    The patient reports experiencing dizziness upon standing quickly. He recalls a low blood pressure reading of 103/57   at [facility], which raised his concern. He is currently on lisinopril 40 mg and amlodipine 5 mg, which was recently increased to 10 mg by his cardiologist at the lipid clinic. He is also on metformin and empagliflozin for diabetes management.     The patient sought audiology for a hearing test yesterday, but was unable to complete the test due to excessive wax accumulation in the right ear. He successfully cleaned his ear with a kit last night.    The patient experienced leg weakness and atrophy due to statins, which led to the discontinuation of statins.      The patient is on levothyroxine for thyroid management and reports satisfactory energy levels.    The patient has not been taking vitamin D supplements.    The patient occasionally experiences reflux symptoms since stopping PPI due to concerns about med  interactions    The patient experiences neuropathy predominantly in his feet, with occasional tingling in the tip of his fingers. He denies any burning sensations. He has observed that his toes are becoming more  flexible over time.    Supplemental Information  He had BCC cancer on his nose which was removed.     He was put on Plavix. He was taken off the baby aspirin regimen.   His brother has low platelet count.         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 ([facility]) 10/20/2022    Atherosclerosis of aorta ([facility]) 10/20/2022    Diabetes mellitus ([facility]) 10/20/2022    Hypertriglyceridemia     Elevated CPK     Class 2 severe obesity with serious comorbidity and body mass index (BMI) of 38.0 to 38.9 in adult ([facility]) 08/31/2018    Type 2 diabetes mellitus with circulatory disorder, without long-term current use of insulin ([facility]) 08/31/2018    Hypothyroid 12/28/2017    Neuropathy 11/30/2016    Essential hypertension 03/19/2016    Foot drop, bilateral 03/18/2016    Thyroid activity decreased 09/23/2015    Lumbosacral spondylosis without myelopathy 04/29/2015    DDD (degenerative disc disease), lumbar 12/31/2014    Spinal stenosis of lumbar region 12/31/2014    Leukocytosis 09/05/2014    Coronary arteriosclerosis due to lipid rich plaque     Stented coronary artery 08/09/2012    BPH (benign prostatic hypertrophy) with urinary obstruction 08/09/2012    Dyslipidemia 08/09/2012       Current Outpatient Medications   Medication Sig Dispense Refill    alfuzosin (UROXATRAL) 10 MG SR tablet Take 1 tablet every day by oral route with meal(s) for 90 days, for slow urinary flow.      imiquimod (ALDARA) 5 % cream Apply  topically.      tadalafil (CIALIS) 5 MG tablet TAKE ONE TABLET BY MOUTH ONE TIME DAILY 90 Tablet 1    baclofen (LIORESAL) 10 MG Tab TAKE 0.5-1 TABLETS BY MOUTH 3 TIMES A DAY AS NEEDED (MUSCLE SPASM). 270 Tablet 1    meloxicam (MOBIC)  7.5 MG Tab TAKE 1 TABLET BY MOUTH 2 TIMES A DAY AS NEEDED FOR INFLAMMATION FOR UP TO 90 DAYS. 180 Tablet 0    clopidogrel (PLAVIX) 75 MG Tab Take 1 Tablet by mouth every day. 90 Tablet 3    metFORMIN ER (GLUCOPHAGE XR) 500 MG TABLET SR 24 HR TAKE 2 TABLETS BY MOUTH EVERY  Tablet 3    amLODIPine (NORVASC) 10 MG Tab Take 1 Tablet by mouth every day. 90 Tablet 3    Empagliflozin (JARDIANCE) 10 MG Tab tablet Take 1 Tablet by mouth every day. 90 Tablet 3    omeprazole (PRILOSEC) 20 MG delayed-release capsule Take 1 Capsule by mouth every day. 90 Capsule 3    levothyroxine (SYNTHROID) 75 MCG Tab Take 1 Tablet by mouth every day. 90 Tablet 3    lisinopril (PRINIVIL) 40 MG tablet Take 1 Tablet by mouth every day. 90 Tablet 3    Alirocumab 150 MG/ML Solution Auto-injector Inject 150 mg under the skin every 14 days. Indications: High Amount of Fats in the Blood 6 Each 3    carvedilol (COREG) 12.5 MG Tab TAKE 1 TABLET BY MOUTH TWICE A DAY WITH MEALS 180 Tablet 3    ciclopirox (LOPROX) 0.77 % cream Apply to feet twice a day for 2-3 weeks      GEMTESA 75 MG Tab Take 1 Tablet by mouth every day. FOR 30 DAYS      Alpha-Lipoic Acid 600 MG Tab Take 600 mg by mouth 2 times a day.      docusate sodium (COLACE) 100 MG Cap TAKE 1 CAP BY MOUTH 2 TIMES A DAY *PAYS CASH* 180 Cap 1    FEXOFENADINE HCL PO Take  by mouth as needed.       No current facility-administered medications for this visit.          Current supplements as per medication list.     Allergies: Atorvastatin    Current social contact/activities: friends/family     He  reports that he has never smoked. He has never used smokeless tobacco. He reports current alcohol use. He reports that he does not use drugs.  Counseling given: Not Answered      ROS:          Gait: Uses no assistive device  Ostomy: No  Other tubes: No  Amputations: No  Chronic oxygen use: No  Last eye exam: Up-to-date  Wears hearing aids: yes  : Denies any unmanageable urinary leakage during the last  6 months       Screening:     Depression Screening  Little interest or pleasure in doing things?  0 - not at all  Feeling down, depressed , or hopeless? 0 - not at all  Patient Health Questionnaire Score: 0     If depressive symptoms identified deferred to follow up visit unless specifically addressed in assessment and plan.    Interpretation of PHQ-9 Total Score   Score Severity   1-4 No Depression   5-9 Mild Depression   10-14 Moderate Depression   15-19 Moderately Severe Depression   20-27 Severe Depression    Screening for Cognitive Impairment  Do you or any of your friends or family members have any concern about your memory? No  Three Minute Recall (Leader, Season, Table) 3/3    Edwin clock face with all 12 numbers and set the hands to show 10 minutes after 11.  Yes    Cognitive concerns identified deferred for follow up unless specifically addressed in assessment and plan.    Fall Risk Assessment  Has the patient had two or more falls in the last year or any fall with injury in the last year?  No    Safety Assessment  Do you always wear your seatbelt?  Yes  Any changes to home needed to function safely? No  Difficulty hearing.  No  Patient counseled about all safety risks that were identified.    Functional Assessment ADLs  Are there any barriers preventing you from cooking for yourself or meeting nutritional needs?  No.    Are there any barriers preventing you from driving safely or obtaining transportation?  No.    Are there any barriers preventing you from using a telephone or calling for help?  No    Are there any barriers preventing you from shopping?  No.    Are there any barriers preventing you from taking care of your own finances?  No    Are there any barriers preventing you from managing your medications?  No    Are there any barriers preventing you from showering, bathing or dressing yourself? No    Are there any barriers preventing you from doing housework or laundry? No  Are there any barriers  preventing you from using the toilet?No  Are you currently engaging in any exercise or physical activity?  Yes.      Self-Assessment of Health  What is your perception of your health? Good  Do you sleep more than six hours a night? No  In the past 7 days, how much did pain keep you from doing your normal work? Some  Do you spend quality time with family or friends (virtually or in person)? Yes  Do you usually eat a heart healthy diet that constists of a variety of fruits, vegetables, whole grains and fiber? Yes  Do you eat foods high in fat and/or Fast Food more than three times per week? No    Advance Care Planning  Do you have an Advance Directive, Living Will, Durable Power of , or POLST? No                 Health Maintenance Summary            Overdue - Diabetes: Retinopathy Screening (Yearly) Never done      No completion history exists for this topic.              Overdue - Zoster (Shingles) Vaccines (2 of 3) Overdue since 2/26/2013 01/01/2013  Imm Admin: Zoster Vaccine Live (ZVL) (Zostavax) - HISTORICAL DATA    03/03/2011  Imm Admin: Zoster Vaccine Live (ZVL) (Zostavax) - HISTORICAL DATA              Overdue - Diabetes: Monofilament / LE Exam (Yearly) Overdue since 10/19/2021      10/19/2020  Diabetic Monofilament Lower Extremity Exam              IMM DTaP/Tdap/Td Vaccine (2 - Td or Tdap) Next due on 8/27/2024 08/27/2014  Imm Admin: Tdap Vaccine              A1c Screening (Every 6 Months) Next due on 11/30/2024 05/31/2024  HEMOGLOBIN A1C    12/14/2023  HEMOGLOBIN A1C    11/30/2022  HEMOGLOBIN A1C    11/05/2021  Hemoglobin A1c    07/20/2021  HEMOGLOBIN A1C    Only the first 5 history entries have been loaded, but more history exists.              Diabetes: Urine Protein Screening (Yearly) Next due on 12/14/2024 12/14/2023  MICROALBUMIN CREAT RATIO URINE    07/20/2021  MICROALBUMIN CREAT RATIO URINE    09/09/2020  MICROALBUMIN CREAT RATIO URINE    04/08/2019  MICROALBUMIN CREAT  RATIO URINE              Fasting Lipid Profile (Yearly) Next due on 5/31/2025 05/31/2024  Lipid Profile    12/14/2023  Lipid Profile    11/30/2022  Lipid Profile    03/22/2022  Lipid Profile    09/20/2021  Lipid Profile    Only the first 5 history entries have been loaded, but more history exists.              SERUM CREATININE (Yearly) Next due on 5/31/2025 05/31/2024  CREATININE    05/31/2024  Comp Metabolic Panel    12/14/2023  Comp Metabolic Panel    01/24/2023  Basic Metabolic Panel    11/30/2022  Comp Metabolic Panel    Only the first 5 history entries have been loaded, but more history exists.              Annual Wellness Visit (Yearly) Next due on 6/14/2025 06/14/2024  Level of Service: [place] ANNUAL WELLNESS VISIT-INCLUDES PPPS SUBSEQUE*    06/14/2024  Visit Dx: Medicare annual wellness visit, subsequent    10/19/2020  Visit Dx: Medicare annual wellness visit, subsequent    10/19/2020  Subsequent Annual Wellness Visit - Includes PPPS ()    08/31/2018  Visit Dx: Medicare annual wellness visit, subsequent    Only the first 5 history entries have been loaded, but more history exists.              Pneumococcal Vaccine: 65+ Years (Series Information) Completed      09/23/2015  Imm Admin: Pneumococcal Conjugate Vaccine (Prevnar/PCV-13)    10/01/2014  Imm Admin: Pneumococcal polysaccharide vaccine (PPSV-23)              Hepatitis C Screening  Tentatively Complete      09/09/2020  Hepatitis C Antibody component of HEP C VIRUS ANTIBODY              Influenza Vaccine (Series Information) Completed      09/29/2023  Imm Admin: Influenza Vaccine Adult HD    10/24/2022  Imm Admin: Influenza Vaccine Adult HD    10/11/2021  Imm Admin: Influenza Vaccine Adult HD    10/05/2020  Imm Admin: Influenza Vaccine Adult HD    10/01/2020  Imm Admin: Influenza, Unspecified - HISTORICAL DATA    Only the first 5 history entries have been loaded, but more history exists.              COVID-19 Vaccine (Series Information)  Completed      09/29/2023  Imm Admin: Comirnaty (Covid-19 Vaccine, Mrna, [id number] Formula)    09/07/2022  Imm Admin: MODERNA BIVALENT BOOSTER SARS-COV-2 VACCINE (6+)    05/07/2022  Imm Admin: PFIZER LOCKHART CAP SARS-COV-2 VACCINATION (12+)    09/28/2021  Imm Admin: PFIZER PURPLE CAP SARS-COV-2 VACCINATION (12+)    02/19/2021  Imm Admin: PFIZER PURPLE CAP SARS-COV-2 VACCINATION (12+)    Only the first 5 history entries have been loaded, but more history exists.              Hepatitis A Vaccine (Hep A) (Series Information) Aged Out      No completion history exists for this topic.              Hepatitis B Vaccine (Hep B) (Series Information) Aged Out      No completion history exists for this topic.              HPV Vaccines (Series Information) Aged Out      No completion history exists for this topic.              Polio Vaccine (Inactivated Polio) (Series Information) Aged Out      No completion history exists for this topic.              Meningococcal Immunization (Series Information) Aged Out      No completion history exists for this topic.              Discontinued - Colorectal Cancer Screening  Discontinued        Frequency changed to Never automatically (Topic No Longer Applies)    11/18/2014  AMB REFERRAL TO GI FOR COLONOSCOPY                    Patient Care Team:  Keira Slade M.D. as PCP - General (Family Medicine)  Anaya Figueroa R.N. as Registered Nurse        Social History     Tobacco Use    Smoking status: Never    Smokeless tobacco: Never   Vaping Use    Vaping status: Never Used   Substance Use Topics    Alcohol use: Yes     Comment: 1 per month    Drug use: No     Family History   Problem Relation Age of Onset    Heart Failure Father     Heart Disease Father         valvular    Hypertension Mother     Hyperlipidemia Mother     Hyperlipidemia Brother     Cancer Brother         skin     He  has a past medical history of Anesthesia (10/2021), Arrhythmia, Basal cell adenocarcinoma, Benign  localized hyperplasia of prostate with urinary obstruction and other lower urinary tract symptoms (LUTS)(600.21) (08/09/2012), Benign prostatic hypertrophy, Class 2 severe obesity with serious comorbidity and body mass index (BMI) of 38.0 to 38.9 in adult ([facility]) (08/31/2018), Coronary arteriosclerosis due to lipid rich plaque, Coronary atherosclerosis of native coronary artery (08/09/2012), Diabetes ([facility]), Elevated CPK, Essential hypertriglyceridemia (08/09/2012), Heart burn, High cholesterol, Hypercholesterolemia (08/09/2012), Hypertension, Hypertriglyceridemia, Lumbar spinal stenosis, Obesity (08/28/2014), Obesity (BMI 30-39.9), Pain (04/07/2015), Stented coronary artery (08/09/2012), Unspecified cataract, and Unspecified disorder of thyroid.   Past Surgical History:   Procedure Laterality Date    PB RECONSTR TOTAL SHOULDER IMPLANT Left 2/3/2023    Procedure: LEFT REVERSE TOTAL SHOULDER ARTHROPLASTY;  Surgeon: Radha Brothers M.D.;  Location: [facility];  Service: Orthopedics    OK SHLDR ARTHROSCOP,PART ACROMIOPLAS Right 11/12/2021    Procedure: DECOMPRESSION, SHOULDER, ARTHROSCOPIC - SUBACROMIAL;  Surgeon: Radha Brothers M.D.;  Location: [facility];  Service: Orthopedics    PB LDR ARTHROSCOP,SURG,W/ROTAT CUFF REPB Right 11/12/2021    Procedure: ARTHROSCOPY, SHOULDER, WITH ROTATOR CUFF REPAIR - SUPERIOR CAPSULAR RECONSTRUCTION, REPAIRS AS INDICATED;  Surgeon: Radha Brothers M.D.;  Location: [facility];  Service: Orthopedics    DEBRIDEMENT, LABRUM, HIP, ARTHROSCOPIC Right 11/12/2021    Procedure: ARTHROSCOPIC LABRAL DEBRIDEMENT;  Surgeon: Radha Brothers M.D.;  Location: [facility];  Service: Orthopedics    SHOULDER ARTHROSCOPY Left 2016    LUMBAR LAMINECTOMY DISKECTOMY  4/29/2015    Performed by Juan Pablo Betts M.D. at [facility]    OTHER CARDIAC SURGERY  2008    2 Cardiac stents    STAPEDECTOMY      bilateral ears; only worked on the R ear        Exam:   /80   Pulse 77   Temp 36.3 °C (97.4 °F)   Resp 14   Wt 112 kg (247 lb)   SpO2 95%  Body mass index is 34.45 kg/m².         Physical Exam  Constitutional:       General:  he is not in acute distress.     Appearance: Normal appearance.   HENT:      Head: Normocephalic and atraumatic.      Nose: Nose normal.      Mouth/Throat:      Mouth: Mucous membranes are moist.   Eyes:      Conjunctiva/sclera: Conjunctivae normal.   Cardiovascular:      Rate and Rhythm: Normal rate and regular rhythm.   Pulmonary:      Effort: Pulmonary effort is normal.      Breath sounds: Normal breath sounds. No wheezing.   Abdominal:      General: Abdomen is flat.      Palpations: Abdomen is soft.   Musculoskeletal:      Cervical back: No rigidity.      Right lower leg: No edema.      Left lower leg: No edema.   Lymphadenopathy:      Cervical: No cervical adenopathy.   Skin:     Coloration: Skin is not jaundiced.      Findings: No erythema.   Neurological:      General: No focal deficit present.      Mental Status: She is alert and oriented to person, place, and time. Mental status is at baseline.   Psychiatric:         Mood and Affect: Mood normal.         Behavior: Behavior normal.         Thought Content: Thought content normal.         Judgment: Judgment normal.      Foot exam:  Visual Inspection: Feet without maceration, ulcers, or fissures.  Pedal pulses: intact bilaterally     Hospital Outpatient Visit on 05/31/2024   Component Date Value Ref Range Status    Bun 05/31/2024 21  8 - 22 mg/dL Final    Creatinine 05/31/2024 0.87  0.50 - 1.40 mg/dL Final    GFR (CKD-EPI) 05/31/2024 89  >60 mL/min/1.73 m 2 Final    Comment: Estimated Glomerular Filtration Rate is calculated using  race neutral CKD-EPI 2021 equation per NKF-ASN recommendations.     Hospital Outpatient Visit on 05/31/2024   Component Date Value Ref Range Status    Glycohemoglobin 05/31/2024 6.0 (H)  4.0 - 5.6 % Final    Comment: Increased risk for  diabetes:  5.7 -6.4%  Diabetes:  >6.4%  Glycemic control for adults with diabetes:  <7.0%    The above interpretations are per ADA guidelines.  Diagnosis  of diabetes mellitus on the basis of elevated Hemoglobin A1c  should be confirmed by repeating the Hb A1c test.      Est Avg Glucose 05/31/2024 126  mg/dL Final    Comment: The eAG calculation is based on the A1c-Derived Daily Glucose  (ADAG) study.  See the ADA's website for additional information.      Vitamin B12 -True Cobalamin 05/31/2024 471  211 - 911 pg/mL Final    Sodium 05/31/2024 139  135 - 145 mmol/L Final    Potassium 05/31/2024 4.1  3.6 - 5.5 mmol/L Final    Chloride 05/31/2024 102  96 - 112 mmol/L Final    Co2 05/31/2024 23  20 - 33 mmol/L Final    Anion Gap 05/31/2024 14.0  7.0 - 16.0 Final    Glucose 05/31/2024 125 (H)  65 - 99 mg/dL Final    Bun 05/31/2024 21  8 - 22 mg/dL Final    Creatinine 05/31/2024 0.87  0.50 - 1.40 mg/dL Final    Calcium 05/31/2024 10.3  8.5 - 10.5 mg/dL Final    Correct Calcium 05/31/2024 9.8  8.5 - 10.5 mg/dL Final    AST(SGOT) 05/31/2024 21  12 - 45 U/L Final    ALT(SGPT) 05/31/2024 40  2 - 50 U/L Final    Alkaline Phosphatase 05/31/2024 86  30 - 99 U/L Final    Total Bilirubin 05/31/2024 0.9  0.1 - 1.5 mg/dL Final    Albumin 05/31/2024 4.6  3.2 - 4.9 g/dL Final    Total Protein 05/31/2024 6.6  6.0 - 8.2 g/dL Final    Globulin 05/31/2024 2.0  1.9 - 3.5 g/dL Final    A-G Ratio 05/31/2024 2.3  g/dL Final    Cholesterol,Tot 05/31/2024 129  100 - 199 mg/dL Final    Triglycerides 05/31/2024 180 (H)  0 - 149 mg/dL Final    HDL 05/31/2024 53  >=40 mg/dL Final    LDL 05/31/2024 40  <100 mg/dL Final    TSH 05/31/2024 4.680  0.380 - 5.330 uIU/mL Final    Comment: The 2011 American Thyroid Association (DENI) guidelines  recommended that the interpretation of thyroid function in  pregnancy be based on trimester specific reference ranges.    1st Trimester  0.100-2.500 mIU/L  2nd Trimester  0.200-3.000 mIU/L  3rd Trimester  0.300-3.500  mIU/L    These established reference ranges have not been validated  at [facility].      WBC 05/31/2024 9.1  4.8 - 10.8 K/uL Final    RBC 05/31/2024 5.49  4.70 - 6.10 M/uL Final    Hemoglobin 05/31/2024 16.7  14.0 - 18.0 g/dL Final    Hematocrit 05/31/2024 51.5  42.0 - 52.0 % Final    MCV 05/31/2024 93.8  81.4 - 97.8 fL Final    MCH 05/31/2024 30.4  27.0 - 33.0 pg Final    MCHC 05/31/2024 32.4  32.3 - 36.5 g/dL Final    RDW 05/31/2024 46.2  35.9 - 50.0 fL Final    Platelet Count 05/31/2024 162 (L)  164 - 446 K/uL Final    MPV 05/31/2024 11.5  9.0 - 12.9 fL Final    25-Hydroxy   Vitamin D 25 05/31/2024 18 (L)  30 - 100 ng/mL Final    Comment: Adult Ranges:   <20 ng/mL - Deficiency  20-29 ng/mL - Insufficiency   ng/mL - Sufficiency  Electrochemiluminescence binding assay performed using Roche gerard e  immunoassay analyzer.  The Ele[employer]ys Vitamin D total II assay is intended for  the quantitative determination of total 25 hydroxyvitamin D in human serum  and plasma. This assay is to be used as an aid in the assessment of vitamin  D sufficiency in adults.      GFR (CKD-EPI) 05/31/2024 89  >60 mL/min/1.73 m 2 Final    Comment: Estimated Glomerular Filtration Rate is calculated using  race neutral CKD-EPI 2021 equation per NKF-ASN recommendations.     Hospital Outpatient Visit on 05/01/2024   Component Date Value Ref Range Status    Prostatic Specific Antigen Tot 05/01/2024 12.50 (H)  0.00 - 4.00 ng/mL Final    Comment: Performed using Roche gerard e immunoassay analyzer. tPSA values determined  on patient samples by different testing procedures cannot be directly  compared with one another and could be the cause of erroneous medical  interpretations. If there is a change in the tPSA assay procedure used while  monitoring therapy, then new baselines may need to be established when  comparing previous results.     ]       Assessment and Plan. The following treatment and monitoring plan is recommended:      1. Medicare annual wellness visit, subsequent    2. Type 2 diabetes mellitus with other circulatory complication, without long-term current use of insulin (HCC)-  -stable continue current regimen      3. High prostate specific antigen (PSA)- followed by Urology   4. BPH (benign prostatic hypertrophy) with urinary obstruction-stable on meds,  followed by Urology     5. Hypothyroidism, unspecified type--stable continue current regimen      6. Hypertriglyceridemia-well managed, no med changes indicated    7. Coronary arteriosclerosis due to lipid rich plaque-followed by Cardiology     8. HTN, some orthostasis- cont w/   Lisinopril 40 mg  . The amlodipine dosage has been reduced to 5 mg.      Other orders  - alfuzosin (UROXATRAL) 10 MG SR tablet; Take 1 tablet every day by oral route with meal(s) for 90 days, for slow urinary flow.  - imiquimod (ALDARA) 5 % cream; Apply  topically.      Assessment & Plan       4.  Vit d def-  The patient has been advised to commence a daily intake of 1000 units of vitamin D.    5. Acid reflux.  Pepcid has been recommended as an alternative to omeprazole.                  Services suggested: No services needed at this time  Health Care Screening: Age-appropriate preventive services recommended by USPTF and ACIP covered by Medicare were discussed today. Services ordered if indicated and agreed upon by the patient.  Referrals offered: Community-based lifestyle interventions to reduce health risks and promote self-management and wellness, fall prevention, nutrition, physical activity, tobacco-use cessation, weight loss, and mental health services as per orders if indicated.    Discussion today about general wellness and lifestyle habits:    Prevent falls and reduce trip hazards; Cautioned about securing or removing rugs.  Have a working fire alarm and carbon monoxide detector;   Engage in regular physical activity and social activities     Follow-up:  pt moving to [place]

## 2024-06-27 ENCOUNTER — APPOINTMENT (RX ONLY)
Dept: URBAN - METROPOLITAN AREA CLINIC 6 | Facility: CLINIC | Age: 79
Setting detail: DERMATOLOGY
End: 2024-06-27

## 2024-06-27 DIAGNOSIS — Z85.828 PERSONAL HISTORY OF OTHER MALIGNANT NEOPLASM OF SKIN: ICD-10-CM

## 2024-06-27 DIAGNOSIS — Z71.89 OTHER SPECIFIED COUNSELING: ICD-10-CM

## 2024-06-27 PROBLEM — C44.319 BASAL CELL CARCINOMA OF SKIN OF OTHER PARTS OF FACE: Status: ACTIVE | Noted: 2024-06-27

## 2024-06-27 PROCEDURE — ? SUNSCREEN RECOMMENDATIONS

## 2024-06-27 PROCEDURE — ? COUNSELING

## 2024-06-27 PROCEDURE — 99212 OFFICE O/P EST SF 10 MIN: CPT

## 2024-06-27 PROCEDURE — ? SUNSCREEN TREATMENT REGIMEN

## 2024-06-27 PROCEDURE — ? ADDITIONAL NOTES

## 2024-06-27 ASSESSMENT — LOCATION ZONE DERM
LOCATION ZONE: HAND
LOCATION ZONE: FACE

## 2024-06-27 ASSESSMENT — LOCATION DETAILED DESCRIPTION DERM
LOCATION DETAILED: RIGHT SUPERIOR LATERAL FOREHEAD
LOCATION DETAILED: RIGHT ULNAR DORSAL HAND
LOCATION DETAILED: LEFT RADIAL DORSAL HAND

## 2024-06-27 ASSESSMENT — LOCATION SIMPLE DESCRIPTION DERM
LOCATION SIMPLE: RIGHT HAND
LOCATION SIMPLE: LEFT HAND
LOCATION SIMPLE: RIGHT FOREHEAD

## 2024-06-27 NOTE — PROCEDURE: ADDITIONAL NOTES
Additional Notes: [id number] - Checked 6/27/24 after Aldara, well-healed cicatrix with no evidence of residual BCC
Detail Level: Simple
Render Risk Assessment In Note?: no

## 2024-06-29 ENCOUNTER — HOSPITAL ENCOUNTER (OUTPATIENT)
Dept: RADIOLOGY | Facility: MEDICAL CENTER | Age: 79
End: 2024-06-29
Attending: PHYSICIAN ASSISTANT
Payer: MEDICARE

## 2024-06-29 DIAGNOSIS — R97.20 ELEVATED PROSTATE SPECIFIC ANTIGEN (PSA): ICD-10-CM

## 2024-06-29 PROCEDURE — 700117 HCHG RX CONTRAST REV CODE 255: Mod: JZ | Performed by: PHYSICIAN ASSISTANT

## 2024-06-29 PROCEDURE — 700111 HCHG RX REV CODE 636 W/ 250 OVERRIDE (IP): Mod: JZ,JG | Performed by: RADIOLOGY

## 2024-06-29 PROCEDURE — A9579 GAD-BASE MR CONTRAST NOS,1ML: HCPCS | Mod: JZ | Performed by: PHYSICIAN ASSISTANT

## 2024-06-29 PROCEDURE — 72197 MRI PELVIS W/O & W/DYE: CPT

## 2024-06-29 RX ADMIN — GADOTERIDOL 20 ML: 279.3 INJECTION, SOLUTION INTRAVENOUS at 17:30

## 2024-06-29 RX ADMIN — GLUCAGON 1 MG: 1 INJECTION, POWDER, LYOPHILIZED, FOR SOLUTION INTRAMUSCULAR; INTRAVENOUS at 15:30

## 2024-07-01 RX ORDER — MELOXICAM 7.5 MG/1
TABLET ORAL
Qty: 180 TABLET | Refills: 3 | Status: SHIPPED | OUTPATIENT
Start: 2024-07-01

## 2024-11-05 ENCOUNTER — PATIENT MESSAGE (OUTPATIENT)
Dept: CARDIOLOGY | Facility: MEDICAL CENTER | Age: 79
End: 2024-11-05
Payer: MEDICARE

## 2024-11-12 ENCOUNTER — PATIENT MESSAGE (OUTPATIENT)
Dept: CARDIOLOGY | Facility: MEDICAL CENTER | Age: 79
End: 2024-11-12
Payer: MEDICARE

## 2024-11-12 DIAGNOSIS — E78.5 DYSLIPIDEMIA: ICD-10-CM

## 2024-11-12 DIAGNOSIS — I25.83 CORONARY ATHEROSCLEROSIS DUE TO LIPID RICH PLAQUE: ICD-10-CM

## 2024-11-12 DIAGNOSIS — I25.9 ISCHEMIC HEART DISEASE DUE TO CORONARY ARTERY OBSTRUCTION (HCC): ICD-10-CM

## 2024-11-12 DIAGNOSIS — I24.0 ISCHEMIC HEART DISEASE DUE TO CORONARY ARTERY OBSTRUCTION (HCC): ICD-10-CM

## 2024-11-13 RX ORDER — EVOLOCUMAB 140 MG/ML
140 INJECTION, SOLUTION SUBCUTANEOUS
Qty: 6 ML | Refills: 3 | Status: SHIPPED | OUTPATIENT
Start: 2024-11-13

## 2024-11-13 NOTE — PATIENT COMMUNICATION
To CW , please see pt response below regarding repatha and reply directly to pt regarding advise, thank you!    =============    Preferred pharmacy updated.

## 2025-02-08 ENCOUNTER — PATIENT MESSAGE (OUTPATIENT)
Dept: INTERNAL MEDICINE | Facility: IMAGING CENTER | Age: 80
End: 2025-02-08
Payer: MEDICARE

## 2025-02-09 RX ORDER — SCOPOLAMINE 1 MG/3D
1 PATCH, EXTENDED RELEASE TRANSDERMAL
Qty: 4 PATCH | Refills: 3 | Status: SHIPPED | OUTPATIENT
Start: 2025-02-09

## 2025-03-29 DIAGNOSIS — I10 ESSENTIAL HYPERTENSION: ICD-10-CM

## 2025-03-31 RX ORDER — LISINOPRIL 40 MG/1
40 TABLET ORAL DAILY
Qty: 30 TABLET | Refills: 0 | Status: SHIPPED | OUTPATIENT
Start: 2025-03-31

## 2025-03-31 RX ORDER — BACLOFEN 10 MG/1
5-10 TABLET ORAL 3 TIMES DAILY PRN
Qty: 60 TABLET | Refills: 0 | Status: SHIPPED | OUTPATIENT
Start: 2025-03-31

## 2025-03-31 RX ORDER — AMLODIPINE BESYLATE 10 MG/1
10 TABLET ORAL DAILY
Qty: 30 TABLET | Refills: 0 | Status: SHIPPED | OUTPATIENT
Start: 2025-03-31

## 2025-06-04 ENCOUNTER — APPOINTMENT (OUTPATIENT)
Dept: URBAN - METROPOLITAN AREA CLINIC 6 | Facility: CLINIC | Age: 80
Setting detail: DERMATOLOGY
End: 2025-06-04

## 2025-06-04 DIAGNOSIS — L72.8 OTHER FOLLICULAR CYSTS OF THE SKIN AND SUBCUTANEOUS TISSUE: ICD-10-CM

## 2025-06-04 DIAGNOSIS — D22 MELANOCYTIC NEVI: ICD-10-CM

## 2025-06-04 DIAGNOSIS — Z85.828 PERSONAL HISTORY OF OTHER MALIGNANT NEOPLASM OF SKIN: ICD-10-CM

## 2025-06-04 DIAGNOSIS — L81.4 OTHER MELANIN HYPERPIGMENTATION: ICD-10-CM

## 2025-06-04 DIAGNOSIS — L82.1 OTHER SEBORRHEIC KERATOSIS: ICD-10-CM

## 2025-06-04 DIAGNOSIS — L71.8 OTHER ROSACEA: ICD-10-CM | Status: INADEQUATELY CONTROLLED

## 2025-06-04 DIAGNOSIS — D18.0 HEMANGIOMA: ICD-10-CM

## 2025-06-04 DIAGNOSIS — Z71.89 OTHER SPECIFIED COUNSELING: ICD-10-CM

## 2025-06-04 PROBLEM — D18.01 HEMANGIOMA OF SKIN AND SUBCUTANEOUS TISSUE: Status: ACTIVE | Noted: 2025-06-04

## 2025-06-04 PROBLEM — D22.5 MELANOCYTIC NEVI OF TRUNK: Status: ACTIVE | Noted: 2025-06-04

## 2025-06-04 PROCEDURE — ? DEFER

## 2025-06-04 PROCEDURE — ? COUNSELING

## 2025-06-04 PROCEDURE — ? MEDICATION COUNSELING

## 2025-06-04 PROCEDURE — ? PRESCRIPTION

## 2025-06-04 PROCEDURE — ? SUNSCREEN RECOMMENDATIONS

## 2025-06-04 PROCEDURE — ? ADDITIONAL NOTES

## 2025-06-04 PROCEDURE — ? SUNSCREEN TREATMENT REGIMEN

## 2025-06-04 RX ORDER — TRETIONIN 0.25 MG/G
CREAM TOPICAL
Qty: 45 | Refills: 0 | Status: ERX | COMMUNITY
Start: 2025-06-04

## 2025-06-04 RX ADMIN — TRETIONIN: 0.25 CREAM TOPICAL at 00:00

## 2025-06-04 ASSESSMENT — LOCATION ZONE DERM
LOCATION ZONE: NOSE
LOCATION ZONE: TRUNK
LOCATION ZONE: ARM
LOCATION ZONE: NECK
LOCATION ZONE: HAND

## 2025-06-04 ASSESSMENT — LOCATION SIMPLE DESCRIPTION DERM
LOCATION SIMPLE: POSTERIOR NECK
LOCATION SIMPLE: LEFT HAND
LOCATION SIMPLE: CHEST
LOCATION SIMPLE: NOSE
LOCATION SIMPLE: ABDOMEN
LOCATION SIMPLE: RIGHT HAND
LOCATION SIMPLE: LEFT FOREARM
LOCATION SIMPLE: UPPER BACK

## 2025-06-04 ASSESSMENT — LOCATION DETAILED DESCRIPTION DERM
LOCATION DETAILED: STERNUM
LOCATION DETAILED: LEFT POSTERIOR NECK
LOCATION DETAILED: LEFT RADIAL DORSAL HAND
LOCATION DETAILED: NASAL SUPRATIP
LOCATION DETAILED: LEFT PROXIMAL DORSAL FOREARM
LOCATION DETAILED: PERIUMBILICAL SKIN
LOCATION DETAILED: SUPERIOR THORACIC SPINE
LOCATION DETAILED: EPIGASTRIC SKIN
LOCATION DETAILED: RIGHT ULNAR DORSAL HAND

## 2025-06-04 NOTE — PROCEDURE: ADDITIONAL NOTES
Detail Level: Simple
Additional Notes: Compounded oxymetazoline cream sent to [facility]
Render Risk Assessment In Note?: no

## 2025-06-04 NOTE — PROCEDURE: DEFER
Other Procedure: excision
Size Of Lesion In Cm (Optional): 0
Introduction Text (Please End With A Colon): Declines treatment at this time
Detail Level: Detailed

## 2025-06-04 NOTE — PROCEDURE: MEDICATION COUNSELING
Wegovy Counseling: I reviewed the possible side effects including: thyroid tumors, kidney disease, gallbladder disease, abdominal pain, constipation, diarrhea, nausea, vomiting and pancreatitis. Do not take this medication if you have a history or family history of multiple endocrine neoplasia syndrome type 2. Side effects reviewed, pt to contact office should one occur.
Opioid Pregnancy And Lactation Text: These medications can lead to premature delivery and should be avoided during pregnancy. These medications are also present in breast milk in small amounts.
Bimzelx Counseling:  I discussed with the patient the risks of Bimzelx including but not limited to depression, immunosuppression, allergic reactions and infections.  The patient understands that monitoring is required including a PPD at baseline and must alert us or the primary physician if symptoms of infection or other concerning signs are noted.
Soolantra Counseling: I discussed with the patients the risks of topial Soolantra. This is a medicine which decreases the number of mites and inflammation in the skin. You experience burning, stinging, eye irritation or allergic reactions.  Please call our office if you develop any problems from using this medication.
Valtrex Pregnancy And Lactation Text: this medication is Pregnancy Category B and is considered safe during pregnancy. This medication is not directly found in breast milk but it's metabolite acyclovir is present.
Azelaic Acid Pregnancy And Lactation Text: This medication is considered safe during pregnancy and breast feeding.
Dupixent Pregnancy And Lactation Text: This medication likely crosses the placenta but the risk for the fetus is uncertain. This medication is excreted in breast milk.
Imiquimod Counseling:  I discussed with the patient the risks of imiquimod including but not limited to erythema, scaling, itching, weeping, crusting, and pain.  Patient understands that the inflammatory response to imiquimod is variable from person to person and was educated regarded proper titration schedule.  If flu-like symptoms develop, patient knows to discontinue the medication and contact us.
Metronidazole Pregnancy And Lactation Text: This medication is Pregnancy Category B and considered safe during pregnancy.  It is also excreted in breast milk.
Winlevi Pregnancy And Lactation Text: This medication is considered safe during pregnancy and breastfeeding.
Nsaids Pregnancy And Lactation Text: These medications are considered safe up to 30 weeks gestation. It is excreted in breast milk.
Cellcept Counseling:  I discussed with the patient the risks of mycophenolate mofetil including but not limited to infection/immunosuppression, GI upset, hypokalemia, hypercholesterolemia, bone marrow suppression, lymphoproliferative disorders, malignancy, GI ulceration/bleed/perforation, colitis, interstitial lung disease, kidney failure, progressive multifocal leukoencephalopathy, and birth defects.  The patient understands that monitoring is required including a baseline creatinine and regular CBC testing. In addition, patient must alert us immediately if symptoms of infection or other concerning signs are noted.
High Dose Vitamin A Counseling: Side effects reviewed, pt to contact office should one occur.
Gabapentin Counseling: I discussed with the patient the risks of gabapentin including but not limited to dizziness, somnolence, fatigue and ataxia.
Simlandi Counseling:  I discussed with the patient the risks of adalimumab including but not limited to myelosuppression, immunosuppression, autoimmune hepatitis, demyelinating diseases, lymphoma, and serious infections.  The patient understands that monitoring is required including a PPD at baseline and must alert us or the primary physician if symptoms of infection or other concerning signs are noted.
Minoxidil Pregnancy And Lactation Text: This medication has not been assigned a Pregnancy Risk Category but animal studies failed to show danger with the topical medication. It is unknown if the medication is excreted in breast milk.
Bimzelx Pregnancy And Lactation Text: This medication crosses the placenta and the safety is uncertain during pregnancy. It is unknown if this medication is present in breast milk.
Soolantra Pregnancy And Lactation Text: This medication is Pregnancy Category C. This medication is considered safe during breast feeding.
Wegovy Pregnancy And Lactation Text: The fetal risk of this medication is unknown and taking while pregnant is not recommended. It is unknown if this medication is present in breast milk.
Detail Level: Simple
Ebglyss Counseling: I discussed with the patient the risks of lebrikizumab including but not limited to eye inflammation and irritation, cold sores, injection site reactions, allergic reactions and increased risk of parasitic infection. The patient understands that monitoring is required and they must alert us or the primary physician if symptoms of infection or other concerning signs are noted.
Use Enhanced Medication Counseling?: No
Benzoyl Peroxide Counseling: Patient counseled that medicine may cause skin irritation and bleach clothing.  In the event of skin irritation, the patient was advised to reduce the amount of the drug applied or use it less frequently.   The patient verbalized understanding of the proper use and possible adverse effects of benzoyl peroxide.  All of the patient's questions and concerns were addressed.
Cellcept Pregnancy And Lactation Text: This medication is Pregnancy Category D and isn't considered safe during pregnancy. It is unknown if this medication is excreted in breast milk.
Imiquimod Pregnancy And Lactation Text: This medication is Pregnancy Category C. It is unknown if this medication is excreted in breast milk.
Cimzia Counseling:  I discussed with the patient the risks of Cimzia including but not limited to immunosuppression, allergic reactions and infections.  The patient understands that monitoring is required including a PPD at baseline and must alert us or the primary physician if symptoms of infection or other concerning signs are noted.
Gabapentin Pregnancy And Lactation Text: This medication is Pregnancy Category C and isn't considered safe during pregnancy. It is excreted in breast milk.
Olanzapine Counseling- I discussed with the patient the common side effects of olanzapine including but are not limited to: lack of energy, dry mouth, increased appetite, sleepiness, tremor, constipation, dizziness, changes in behavior, or restlessness.  Explained that teenagers are more likely to experience headaches, abdominal pain, pain in the arms or legs, tiredness, and sleepiness.  Serious side effects include but are not limited: increased risk of death in elderly patients who are confused, have memory loss, or dementia-related psychosis; hyperglycemia; increased cholesterol and triglycerides; and weight gain.
Minocycline Counseling: Patient advised regarding possible photosensitivity and discoloration of the teeth, skin, lips, tongue and gums.  Patient instructed to avoid sunlight, if possible.  When exposed to sunlight, patients should wear protective clothing, sunglasses, and sunscreen.  The patient was instructed to call the office immediately if the following severe adverse effects occur:  hearing changes, easy bruising/bleeding, severe headache, or vision changes.  The patient verbalized understanding of the proper use and possible adverse effects of minocycline.  All of the patient's questions and concerns were addressed.
VTAMA Counseling: I discussed with the patient that VTAMA is not for use in the eyes, mouth or mouth. They should call the office if they develop any signs of allergic reactions to VTAMA. The patient verbalized understanding of the proper use and possible adverse effects of VTAMA.  All of the patient's questions and concerns were addressed.
Mirvaso Counseling: Mirvaso is a topical medication which can decrease superficial blood flow where applied. Side effects are uncommon and include stinging, redness and allergic reactions.
Simlandi Pregnancy And Lactation Text: This medication is Pregnancy Category B and is considered safe during pregnancy. It is unknown if this medication is excreted in breast milk.
Ebglyss Pregnancy And Lactation Text: This medication likely crosses the placenta but the risk for the fetus is uncertain. It is unknown if this medication is excreted in breast milk.
Topical Retinoid counseling:  Patient advised to apply a pea-sized amount only at bedtime and wait 30 minutes after washing their face before applying.  If too drying, patient may add a non-comedogenic moisturizer. The patient verbalized understanding of the proper use and possible adverse effects of retinoids.  All of the patient's questions and concerns were addressed.
High Dose Vitamin A Pregnancy And Lactation Text: High dose vitamin A therapy is contraindicated during pregnancy and breast feeding.
Klisyri Counseling:  I discussed with the patient the risks of Klisyri including but not limited to erythema, scaling, itching, weeping, crusting, and pain.
Cyclophosphamide Counseling:  I discussed with the patient the risks of cyclophosphamide including but not limited to hair loss, hormonal abnormalities, decreased fertility, abdominal pain, diarrhea, nausea and vomiting, bone marrow suppression and infection. The patient understands that monitoring is required while taking this medication.
Albendazole Counseling:  I discussed with the patient the risks of albendazole including but not limited to cytopenia, kidney damage, nausea/vomiting and severe allergy.  The patient understands that this medication is being used in an off-label manner.
Zepbound Counseling: I reviewed the possible side effects including: thyroid tumors, kidney disease, gallbladder disease, abdominal pain, constipation, diarrhea, nausea, vomiting and pancreatitis. Do not take this medication if you have a history or family history of multiple endocrine neoplasia syndrome type 2. Side effects reviewed, pt to contact office should one occur.
Olanzapine Pregnancy And Lactation Text: This medication is pregnancy category C.   There are no adequate and well controlled trials with olanzapine in pregnant females.  Olanzapine should be used during pregnancy only if the potential benefit justifies the potential risk to the fetus.   In a study in lactating healthy women, olanzapine was excreted in breast milk.  It is recommended that women taking olanzapine should not breast feed.
Glycopyrrolate Counseling:  I discussed with the patient the risks of glycopyrrolate including but not limited to skin rash, drowsiness, dry mouth, difficulty urinating, and blurred vision.
Vtama Pregnancy And Lactation Text: It is unknown if this medication can cause problems during pregnancy and breastfeeding.
Include Pregnancy/Lactation Warning?: Add Automatically Based on Childbearing Potential and Patient Age
Benzoyl Peroxide Pregnancy And Lactation Text: This medication is Pregnancy Category C. It is unknown if benzoyl peroxide is excreted in breast milk.
Cimzia Pregnancy And Lactation Text: This medication crosses the placenta but can be considered safe in certain situations. Cimzia may be excreted in breast milk.
Mirvaso Pregnancy And Lactation Text: This medication has not been assigned a Pregnancy Risk Category. It is unknown if the medication is excreted in breast milk.
Simponi Counseling:  I discussed with the patient the risks of golimumab including but not limited to myelosuppression, immunosuppression, autoimmune hepatitis, demyelinating diseases, lymphoma, and serious infections.  The patient understands that monitoring is required including a PPD at baseline and must alert us or the primary physician if symptoms of infection or other concerning signs are noted.
Enbrel Counseling:  I discussed with the patient the risks of etanercept including but not limited to myelosuppression, immunosuppression, autoimmune hepatitis, demyelinating diseases, lymphoma, and infections.  The patient understands that monitoring is required including a PPD at baseline and must alert us or the primary physician if symptoms of infection or other concerning signs are noted.
Minocycline Pregnancy And Lactation Text: This medication is Pregnancy Category D and not consider safe during pregnancy. It is also excreted in breast milk.
Fluconazole Counseling:  Patient counseled regarding adverse effects of fluconazole including but not limited to headache, diarrhea, nausea, upset stomach, liver function test abnormalities, taste disturbance, and stomach pain.  There is a rare possibility of liver failure that can occur when taking fluconazole.  The patient understands that monitoring of LFTs and kidney function test may be required, especially at baseline. The patient verbalized understanding of the proper use and possible adverse effects of fluconazole.  All of the patient's questions and concerns were addressed.
Zoryve Counseling:  I discussed with the patient that Zoryve is not for use in the eyes, mouth or vagina. The most commonly reported side effects include diarrhea, headache, insomnia, application site pain, upper respiratory tract infections, and urinary tract infections.  All of the patient's questions and concerns were addressed.
Klisyri Pregnancy And Lactation Text: It is unknown if this medication can harm a developing fetus or if it is excreted in breast milk.
Oral Minoxidil Counseling- I discussed with the patient the risks of oral minoxidil including but not limited to shortness of breath, swelling of the feet or ankles, dizziness, lightheadedness, unwanted hair growth and allergic reaction.  The patient verbalized understanding of the proper use and possible adverse effects of oral minoxidil.  All of the patient's questions and concerns were addressed.
Cyclophosphamide Pregnancy And Lactation Text: This medication is Pregnancy Category D and it isn't considered safe during pregnancy. This medication is excreted in breast milk.
Carac Counseling:  I discussed with the patient the risks of Carac including but not limited to erythema, scaling, itching, weeping, crusting, and pain.
Glycopyrrolate Pregnancy And Lactation Text: This medication is Pregnancy Category B and is considered safe during pregnancy. It is unknown if it is excreted breast milk.
Simponi Pregnancy And Lactation Text: The risk during pregnancy and breastfeeding is uncertain with this medication.
Opzelura Counseling:  I discussed with the patient the risks of Opzelura including but not limited to nasopharngitis, bronchitis, ear infection, eosinophila, hives, diarrhea, folliculitis, tonsillitis, and rhinorrhea.  Taken orally, this medication has been linked to serious infections; higher rate of mortality; malignancy and lymphoproliferative disorders; major adverse cardiovascular events; thrombosis; thrombocytopenia, anemia, and neutropenia; and lipid elevations.
Albendazole Pregnancy And Lactation Text: This medication is Pregnancy Category C and it isn't known if it is safe during pregnancy. It is also excreted in breast milk.
Quinolones Counseling:  I discussed with the patient the risks of fluoroquinolones including but not limited to GI upset, allergic reaction, drug rash, diarrhea, dizziness, photosensitivity, yeast infections, liver function test abnormalities, tendonitis/tendon rupture.
Tazorac Counseling:  Patient advised that medication is irritating and drying.  Patient may need to apply sparingly and wash off after an hour before eventually leaving it on overnight.  The patient verbalized understanding of the proper use and possible adverse effects of tazorac.  All of the patient's questions and concerns were addressed.
Cosentyx Counseling:  I discussed with the patient the risks of Cosentyx including but not limited to worsening of Crohn's disease, immunosuppression, allergic reactions and infections.  The patient understands that monitoring is required including a PPD at baseline and must alert us or the primary physician if symptoms of infection or other concerning signs are noted.
Birth Control Pills Pregnancy And Lactation Text: This medication should be avoided if pregnant and for the first 30 days post-partum.
Carac Pregnancy And Lactation Text: This medication is Pregnancy Category X and contraindicated in pregnancy and in women who may become pregnant. It is unknown if this medication is excreted in breast milk.
Topical Sulfur Applications Counseling: Topical Sulfur Counseling: Patient counseled that this medication may cause skin irritation or allergic reactions.  In the event of skin irritation, the patient was advised to reduce the amount of the drug applied or use it less frequently.   The patient verbalized understanding of the proper use and possible adverse effects of topical sulfur application.  All of the patient's questions and concerns were addressed.
Thalidomide Counseling: I discussed with the patient the risks of thalidomide including but not limited to birth defects, anxiety, weakness, chest pain, dizziness, cough and severe allergy.
Aklief counseling:  Patient advised to apply a pea-sized amount only at bedtime and wait 30 minutes after washing their face before applying.  If too drying, patient may add a non-comedogenic moisturizer.  The most commonly reported side effects including irritation, redness, scaling, dryness, stinging, burning, itching, and increased risk of sunburn.  The patient verbalized understanding of the proper use and possible adverse effects of retinoids.  All of the patient's questions and concerns were addressed.
Doxycycline Counseling:  Patient counseled regarding possible photosensitivity and increased risk for sunburn.  Patient instructed to avoid sunlight, if possible.  When exposed to sunlight, patients should wear protective clothing, sunglasses, and sunscreen.  The patient was instructed to call the office immediately if the following severe adverse effects occur:  hearing changes, easy bruising/bleeding, severe headache, or vision changes.  The patient verbalized understanding of the proper use and possible adverse effects of doxycycline.  All of the patient's questions and concerns were addressed.
Acitretin Pregnancy And Lactation Text: This medication is Pregnancy Category X and should not be given to women who are pregnant or may become pregnant in the future. This medication is excreted in breast milk.
Nemluvio Pregnancy And Lactation Text: It is not known if Nemluvio causes fetal harm or is present in breast milk. Please proceed with caution if patients who are pregnant or breastfeeding.
Over the Counter Salicylic Acid Counseling: Over the counter salicylic acid preparations can be used effectively to treat warts at home. There are two types of application: liquid and plaster. Liquid preparations are applied like nail polish and the plaster applications are applied like a bandage (you may need to apply duct tape over the plaster to keep it in place). Dead and macerated skin should be removed regularly with a nail file or nail clippers for best results.
Doxycycline Pregnancy And Lactation Text: This medication is Pregnancy Category D and not consider safe during pregnancy. It is also excreted in breast milk but is considered safe for shorter treatment courses.
Rinvoq Counseling: I discussed with the patient the risks of Rinvoq therapy including but not limited to upper respiratory tract infections, shingles, cold sores, bronchitis, nausea, cough, fever, acne, and headache. Live vaccines should be avoided.  This medication has been linked to serious infections; higher rate of mortality; malignancy and lymphoproliferative disorders; major adverse cardiovascular events; thrombosis; thrombocytopenia, anemia, and neutropenia; lipid elevations; liver enzyme elevations; and gastrointestinal perforations.
Eucrisa Counseling: Patient may experience a mild burning sensation during topical application. Eucrisa is not approved in children less than 2 years of age.
Saxenda Counseling: I reviewed the possible side effects including: thyroid tumors, kidney disease, gallbladder disease, abdominal pain, constipation, diarrhea, nausea, vomiting and pancreatitis. Do not take this medication if you have a history or family history of multiple endocrine neoplasia syndrome type 2. Side effects reviewed, pt to contact office should one occur.
Aklief Pregnancy And Lactation Text: It is unknown if this medication is safe to use during pregnancy.  It is unknown if this medication is excreted in breast milk.  Breastfeeding women should use the topical cream on the smallest area of the skin for the shortest time needed while breastfeeding.  Do not apply to nipple and areola.
Xolair Counseling:  Patient informed of potential adverse effects including but not limited to fever, muscle aches, rash and allergic reactions.  The patient verbalized understanding of the proper use and possible adverse effects of Xolair.  All of the patient's questions and concerns were addressed.
Topical Sulfur Applications Pregnancy And Lactation Text: This medication is Pregnancy Category C and has an unknown safety profile during pregnancy. It is unknown if this topical medication is excreted in breast milk.
Bexarotene Counseling:  I discussed with the patient the risks of bexarotene including but not limited to hair loss, dry lips/skin/eyes, liver abnormalities, hyperlipidemia, pancreatitis, depression/suicidal ideation, photosensitivity, drug rash/allergic reactions, hypothyroidism, anemia, leukopenia, infection, cataracts, and teratogenicity.  Patient understands that they will need regular blood tests to check lipid profile, liver function tests, white blood cell count, thyroid function tests and pregnancy test if applicable.
Colchicine Counseling:  Patient counseled regarding adverse effects including but not limited to stomach upset (nausea, vomiting, stomach pain, or diarrhea).  Patient instructed to limit alcohol consumption while taking this medication.  Colchicine may reduce blood counts especially with prolonged use.  The patient understands that monitoring of kidney function and blood counts may be required, especially at baseline. The patient verbalized understanding of the proper use and possible adverse effects of colchicine.  All of the patient's questions and concerns were addressed.
Thalidomide Pregnancy And Lactation Text: This medication is Pregnancy Category X and is absolutely contraindicated during pregnancy. It is unknown if it is excreted in breast milk.
Rituxan Counseling:  I discussed with the patient the risks of Rituxan infusions. Side effects can include infusion reactions, severe drug rashes including mucocutaneous reactions, reactivation of latent hepatitis and other infections and rarely progressive multifocal leukoencephalopathy.  All of the patient's questions and concerns were addressed.
Over The Counter Salicylic Acid Pregnancy And Lactation Text: It is not recommended to use high dose OTC salicylic acid while pregnant. Lower dose topical preparations are considered safe.
Tranexamic Acid Counseling:  Patient advised of the small risk of bleeding problems with tranexamic acid. They were also instructed to call if they developed any nausea, vomiting or diarrhea. All of the patient's questions and concerns were addressed.
Rinvoq Pregnancy And Lactation Text: Based on animal studies, Rinvoq may cause embryo-fetal harm when administered to pregnant women.  The medication should not be used in pregnancy.  Breastfeeding is not recommended during treatment and for 6 days after the last dose.
Xolair Pregnancy And Lactation Text: This medication is Pregnancy Category B and is considered safe during pregnancy. This medication is excreted in breast milk.
Amzeeq Counseling: Amzeeq is a topical antibiotic foam which contains minocycline.  The most commonly reported side effect of Amzeeq is headache.  The medication is flammable and should not be applied near a fire, flame, or while smoking.  Sun precautions is recommended to prevent sunburn.
Erythromycin Counseling:  I discussed with the patient the risks of erythromycin including but not limited to GI upset, allergic reaction, drug rash, diarrhea, increase in liver enzymes, and yeast infections.
Adbry Counseling: I discussed with the patient the risks of tralokinumab including but not limited to eye infection and irritation, cold sores, injection site reactions, worsening of asthma, allergic reactions and increased risk of parasitic infection.  Live vaccines should be avoided while taking tralokinumab. The patient understands that monitoring is required and they must alert us or the primary physician if symptoms of infection or other concerning signs are noted.
Rituxan Pregnancy And Lactation Text: This medication is Pregnancy Category C and it isn't know if it is safe during pregnancy. It is unknown if this medication is excreted in breast milk but similar antibodies are known to be excreted.
Wartpeel Counseling:  I discussed with the patient the risks of Wartpeel including but not limited to erythema, scaling, itching, weeping, crusting, and pain.
Bexarotene Pregnancy And Lactation Text: This medication is Pregnancy Category X and should not be given to women who are pregnant or may become pregnant. This medication should not be used if you are breast feeding.
Solaraze Counseling:  I discussed with the patient the risks of Solaraze including but not limited to erythema, scaling, itching, weeping, crusting, and pain.
Prednisone Pregnancy And Lactation Text: This medication is Pregnancy Category C and it isn't know if it is safe during pregnancy. This medication is excreted in breast milk.
Azathioprine Counseling:  I discussed with the patient the risks of azathioprine including but not limited to myelosuppression, immunosuppression, hepatotoxicity, lymphoma, and infections.  The patient understands that monitoring is required including baseline LFTs, Creatinine, possible TPMP genotyping and weekly CBCs for the first month and then every 2 weeks thereafter.  The patient verbalized understanding of the proper use and possible adverse effects of azathioprine.  All of the patient's questions and concerns were addressed.
Hydroquinone Counseling:  Patient advised that medication may result in skin irritation, lightening (hypopigmentation), dryness, and burning.  In the event of skin irritation, the patient was advised to reduce the amount of the drug applied or use it less frequently.  Rarely, spots that are treated with hydroquinone can become darker (pseudoochronosis).  Should this occur, patient instructed to stop medication and call the office. The patient verbalized understanding of the proper use and possible adverse effects of hydroquinone.  All of the patient's questions and concerns were addressed.
Tranexamic Acid Pregnancy And Lactation Text: It is unknown if this medication is safe during pregnancy or breast feeding.
Semaglutide Counseling: I reviewed the possible side effects including: thyroid tumors, kidney disease, gallbladder disease, abdominal pain, constipation, diarrhea, nausea, vomiting and pancreatitis. Do not take this medication if you have a history or family history of multiple endocrine neoplasia syndrome type 2. Side effects reviewed, pt to contact office should one occur.
Xeljanz Counseling: I discussed with the patient the risks of Xeljanz therapy including increased risk of infection, liver issues, headache, diarrhea, or cold symptoms. Live vaccines should be avoided. They were instructed to call if they have any problems.
Dapsone Counseling: I discussed with the patient the risks of dapsone including but not limited to hemolytic anemia, agranulocytosis, rashes, methemoglobinemia, kidney failure, peripheral neuropathy, headaches, GI upset, and liver toxicity.  Patients who start dapsone require monitoring including baseline LFTs and weekly CBCs for the first month, then every month thereafter.  The patient verbalized understanding of the proper use and possible adverse effects of dapsone.  All of the patient's questions and concerns were addressed.
Amzeeq Pregnancy And Lactation Text: It is not recommended to use the product if you are pregnant.
Erythromycin Pregnancy And Lactation Text: This medication is Pregnancy Category B and is considered safe during pregnancy. It is also excreted in breast milk.
Adbry Pregnancy And Lactation Text: It is unknown if this medication will adversely affect pregnancy or breast feeding.
Solaraze Pregnancy And Lactation Text: This medication is Pregnancy Category B and is considered safe. There is some data to suggest avoiding during the third trimester. It is unknown if this medication is excreted in breast milk.
Cantharidin Pregnancy And Lactation Text: This medication has not been proven safe during pregnancy. It is unknown if this medication is excreted in breast milk.
Siliq Counseling:  I discussed with the patient the risks of Siliq including but not limited to new or worsening depression, suicidal thoughts and behavior, immunosuppression, malignancy, posterior leukoencephalopathy syndrome, and serious infections.  The patient understands that monitoring is required including a PPD at baseline and must alert us or the primary physician if symptoms of infection or other concerning signs are noted. There is also a special program designed to monitor depression which is required with Siliq.
Isotretinoin Counseling: Patient should get monthly blood tests, not donate blood, not drive at night if vision affected, not share medication, and not undergo elective surgery for 6 months after tx completed. Side effects reviewed, pt to contact office should one occur.
Opioid Counseling: I discussed with the patient the potential side effects of opioids including but not limited to addiction, altered mental status, and depression. I stressed avoiding alcohol, benzodiazepines, muscle relaxants and sleep aids unless specifically okayed by a physician. The patient verbalized understanding of the proper use and possible adverse effects of opioids. All of the patient's questions and concerns were addressed. They were instructed to flush the remaining pills down the toilet if they did not need them for pain.
Xelsarayz Pregnancy And Lactation Text: This medication is Pregnancy Category D and is not considered safe during pregnancy.  The risk during breast feeding is also uncertain.
Metronidazole Counseling:  I discussed with the patient the risks of metronidazole including but not limited to seizures, nausea/vomiting, a metallic taste in the mouth, nausea/vomiting and severe allergy.
Winlevi Counseling:  I discussed with the patient the risks of topical clascoterone including but not limited to erythema, scaling, itching, and stinging. Patient voiced their understanding.
Valtrex Counseling: I discussed with the patient the risks of valacyclovir including but not limited to kidney damage, nausea, vomiting and severe allergy.  The patient understands that if the infection seems to be worsening or is not improving, they are to call.
Nsaids Counseling: NSAID Counseling: I discussed with the patient that NSAIDs should be taken with food. Prolonged use of NSAIDs can result in the development of stomach ulcers.  Patient advised to stop taking NSAIDs if abdominal pain occurs.  The patient verbalized understanding of the proper use and possible adverse effects of NSAIDs.  All of the patient's questions and concerns were addressed.
Azelaic Acid Counseling: Patient counseled that medicine may cause skin irritation and to avoid applying near the eyes.  In the event of skin irritation, the patient was advised to reduce the amount of the drug applied or use it less frequently.   The patient verbalized understanding of the proper use and possible adverse effects of azelaic acid.  All of the patient's questions and concerns were addressed.
Isotretinoin Pregnancy And Lactation Text: This medication is Pregnancy Category X and is considered extremely dangerous during pregnancy. It is unknown if it is excreted in breast milk.
Dapsone Pregnancy And Lactation Text: This medication is Pregnancy Category C and is not considered safe during pregnancy or breast feeding.
Hydroxyzine Counseling: Patient advised that the medication is sedating and not to drive a car after taking this medication.  Patient informed of potential adverse effects including but not limited to dry mouth, urinary retention, and blurry vision.  The patient verbalized understanding of the proper use and possible adverse effects of hydroxyzine.  All of the patient's questions and concerns were addressed.
Ilumya Counseling: I discussed with the patient the risks of tildrakizumab including but not limited to immunosuppression, malignancy, posterior leukoencephalopathy syndrome, and serious infections.  The patient understands that monitoring is required including a PPD at baseline and must alert us or the primary physician if symptoms of infection or other concerning signs are noted.
Cibinqo Pregnancy And Lactation Text: It is unknown if this medication will adversely affect pregnancy or breast feeding.  You should not take this medication if you are currently pregnant or planning a pregnancy or while breastfeeding.
Propranolol Counseling:  I discussed with the patient the risks of propranolol including but not limited to low heart rate, low blood pressure, low blood sugar, restlessness and increased cold sensitivity. They should call the office if they experience any of these side effects.
Tetracycline Counseling: Patient counseled regarding possible photosensitivity and increased risk for sunburn.  Patient instructed to avoid sunlight, if possible.  When exposed to sunlight, patients should wear protective clothing, sunglasses, and sunscreen.  The patient was instructed to call the office immediately if the following severe adverse effects occur:  hearing changes, easy bruising/bleeding, severe headache, or vision changes.  The patient verbalized understanding of the proper use and possible adverse effects of tetracycline.  All of the patient's questions and concerns were addressed. Patient understands to avoid pregnancy while on therapy due to potential birth defects.
Topical Metronidazole Counseling: Metronidazole is a topical antibiotic medication. You may experience burning, stinging, redness, or allergic reactions.  Please call our office if you develop any problems from using this medication.
Ketoconazole Counseling:   Patient counseled regarding improving absorption with orange juice.  Adverse effects include but are not limited to breast enlargement, headache, diarrhea, nausea, upset stomach, liver function test abnormalities, taste disturbance, and stomach pain.  There is a rare possibility of liver failure that can occur when taking ketoconazole. The patient understands that monitoring of LFTs may be required, especially at baseline. The patient verbalized understanding of the proper use and possible adverse effects of ketoconazole.  All of the patient's questions and concerns were addressed.
Finasteride Female Counseling: Finasteride Counseling:  I discussed with the patient the risks of use of finasteride including but not limited to decreased libido and sexual dysfunction. Explained the teratogenic nature of the medication and stressed the importance of not getting pregnant during treatment. All of the patient's questions and concerns were addressed.
Cephalexin Counseling: I counseled the patient regarding use of cephalexin as an antibiotic for prophylactic and/or therapeutic purposes. Cephalexin (commonly prescribed under brand name Keflex) is a cephalosporin antibiotic which is active against numerous classes of bacteria, including most skin bacteria. Side effects may include nausea, diarrhea, gastrointestinal upset, rash, hives, yeast infections, and in rare cases, hepatitis, kidney disease, seizures, fever, confusion, neurologic symptoms, and others. Patients with severe allergies to penicillin medications are cautioned that there is about a 10% incidence of cross-reactivity with cephalosporins. When possible, patients with penicillin allergies should use alternatives to cephalosporins for antibiotic therapy.
Hydroxyzine Pregnancy And Lactation Text: This medication is not safe during pregnancy and should not be taken. It is also excreted in breast milk and breast feeding isn't recommended.
Niacinamide Pregnancy And Lactation Text: These medications are considered safe during pregnancy.
Sotyktu Counseling:  I discussed the most common side effects of Sotyktu including: common cold, sore throat, sinus infections, cold sores, canker sores, folliculitis, and acne.  I also discussed more serious side effects of Sotyktu including but not limited to: serious allergic reactions; increased risk for infections such as TB; cancers such as lymphomas; rhabdomyolysis and elevated CPK; and elevated triglycerides and liver enzymes. 
Qbrexza Counseling:  I discussed with the patient the risks of Qbrexza including but not limited to headache, mydriasis, blurred vision, dry eyes, nasal dryness, dry mouth, dry throat, dry skin, urinary hesitation, and constipation.  Local skin reactions including erythema, burning, stinging, and itching can also occur.
Libtayo Counseling- I discussed with the patient the risks of Libtayo including but not limited to nausea, vomiting, diarrhea, and bone or muscle pain.  The patient verbalized understanding of the proper use and possible adverse effects of Libtayo.  All of the patient's questions and concerns were addressed.
Litfulo Counseling: I discussed with the patient the risks of Litfulo therapy including but not limited to upper respiratory tract infections, shingles, cold sores, and nausea. Live vaccines should be avoided.  This medication has been linked to serious infections; higher rate of mortality; malignancy and lymphoproliferative disorders; major adverse cardiovascular events; thrombosis; gastrointestinal perforations; neutropenia; lymphopenia; anemia; liver enzyme elevations; and lipid elevations.
Cephalexin Pregnancy And Lactation Text: This medication is Pregnancy Category B and considered safe during pregnancy.  It is also excreted in breast milk but can be used safely for shorter doses.
Drysol Counseling:  I discussed with the patient the risks of drysol/aluminum chloride including but not limited to skin rash, itching, irritation, burning.
Ketoconazole Pregnancy And Lactation Text: This medication is Pregnancy Category C and it isn't know if it is safe during pregnancy. It is also excreted in breast milk and breast feeding isn't recommended.
Taltz Counseling: I discussed with the patient the risks of ixekizumab including but not limited to immunosuppression, serious infections, worsening of inflammatory bowel disease and drug reactions.  The patient understands that monitoring is required including a PPD at baseline and must alert us or the primary physician if symptoms of infection or other concerning signs are noted.
Propranolol Pregnancy And Lactation Text: This medication is Pregnancy Category C and it isn't known if it is safe during pregnancy. It is excreted in breast milk.
Topical Metronidazole Pregnancy And Lactation Text: This medication is Pregnancy Category B and considered safe during pregnancy.  It is also considered safe to use while breastfeeding.
Sotyktu Pregnancy And Lactation Text: There is insufficient data to evaluate whether or not Sotyktu is safe to use during pregnancy.   It is not known if Sotyktu passes into breast milk and whether or not it is safe to use when breastfeeding.  
Arava Counseling:  Patient counseled regarding adverse effects of Arava including but not limited to nausea, vomiting, abnormalities in liver function tests. Patients may develop mouth sores, rash, diarrhea, and abnormalities in blood counts. The patient understands that monitoring is required including LFTs and blood counts.  There is a rare possibility of scarring of the liver and lung problems that can occur when taking methotrexate. Persistent nausea, loss of appetite, pale stools, dark urine, cough, and shortness of breath should be reported immediately. Patient advised to discontinue Arava treatment and consult with a physician prior to attempting conception. The patient will have to undergo a treatment to eliminate Arava from the body prior to conception.
Finasteride Pregnancy And Lactation Text: This medication is absolutely contraindicated during pregnancy. It is unknown if it is excreted in breast milk.
Libtayo Pregnancy And Lactation Text: This medication is contraindicated in pregnancy and when breast feeding.
Infliximab Counseling:  I discussed with the patient the risks of infliximab including but not limited to myelosuppression, immunosuppression, autoimmune hepatitis, demyelinating diseases, lymphoma, and serious infections.  The patient understands that monitoring is required including a PPD at baseline and must alert us or the primary physician if symptoms of infection or other concerning signs are noted.
Qbrexza Pregnancy And Lactation Text: There is no available data on Qbrexza use in pregnant women.  There is no available data on Qbrexza use in lactation.
Terbinafine Counseling: Patient counseling regarding adverse effects of terbinafine including but not limited to headache, diarrhea, rash, upset stomach, liver function test abnormalities, itching, taste/smell disturbance, nausea, abdominal pain, and flatulence.  There is a rare possibility of liver failure that can occur when taking terbinafine.  The patient understands that a baseline LFT and kidney function test may be required. The patient verbalized understanding of the proper use and possible adverse effects of terbinafine.  All of the patient's questions and concerns were addressed.
Litfulo Pregnancy And Lactation Text: Based on animal studies, Lifulo may cause embryo-fetal harm when administered to pregnant women.  The medication should not be used in pregnancy.  Breastfeeding is not recommended during treatment.
Clindamycin Counseling: I counseled the patient regarding use of clindamycin as an antibiotic for prophylactic and/or therapeutic purposes. Clindamycin is active against numerous classes of bacteria, including skin bacteria. Side effects may include nausea, diarrhea, gastrointestinal upset, rash, hives, yeast infections, and in rare cases, colitis.
Birth Control Pills Counseling: Birth Control Pill Counseling: I discussed with the patient the potential side effects of OCPs including but not limited to increased risk of stroke, heart attack, thrombophlebitis, deep venous thrombosis, hepatic adenomas, breast changes, GI upset, headaches, and depression.  The patient verbalized understanding of the proper use and possible adverse effects of OCPs. All of the patient's questions and concerns were addressed.
SSKI Counseling:  I discussed with the patient the risks of SSKI including but not limited to thyroid abnormalities, metallic taste, GI upset, fever, headache, acne, arthralgias, paraesthesias, lymphadenopathy, easy bleeding, arrhythmias, and allergic reaction.
Rhofade Counseling: Rhofade is a topical medication which can decrease superficial blood flow where applied. Side effects are uncommon and include stinging, redness and allergic reactions.
Topical Steroids Counseling: I discussed with the patient that prolonged use of topical steroids can result in the increased appearance of superficial blood vessels (telangiectasias), lightening (hypopigmentation) and thinning of the skin (atrophy).  Patient understands to avoid using high potency steroids in skin folds, the groin or the face.  The patient verbalized understanding of the proper use and possible adverse effects of topical steroids.  All of the patient's questions and concerns were addressed.
Elidel Counseling: Patient may experience a mild burning sensation during topical application. Elidel is not approved in children less than 2 years of age. There have been case reports of hematologic and skin malignancies in patients using topical calcineurin inhibitors although causality is questionable.
Odomzo Counseling- I discussed with the patient the risks of Odomzo including but not limited to nausea, vomiting, diarrhea, constipation, weight loss, changes in the sense of taste, decreased appetite, muscle spasms, and hair loss.  The patient verbalized understanding of the proper use and possible adverse effects of Odomzo.  All of the patient's questions and concerns were addressed.
Terbinafine Pregnancy And Lactation Text: This medication is Pregnancy Category B and is considered safe during pregnancy. It is also excreted in breast milk and breast feeding isn't recommended.
Sski Pregnancy And Lactation Text: This medication is Pregnancy Category D and isn't considered safe during pregnancy. It is excreted in breast milk.
Ozempic Counseling: I reviewed the possible side effects including: thyroid tumors, kidney disease, gallbladder disease, abdominal pain, constipation, diarrhea, nausea, vomiting and pancreatitis. Do not take this medication if you have a history or family history of multiple endocrine neoplasia syndrome type 2. Side effects reviewed, pt to contact office should one occur.
Olumiant Counseling: I discussed with the patient the risks of Olumiant therapy including but not limited to upper respiratory tract infections, shingles, cold sores, and nausea. Live vaccines should be avoided.  This medication has been linked to serious infections; higher rate of mortality; malignancy and lymphoproliferative disorders; major adverse cardiovascular events; thrombosis; gastrointestinal perforations; neutropenia; lymphopenia; anemia; liver enzyme elevations; and lipid elevations.
Clofazimine Counseling:  I discussed with the patient the risks of clofazimine including but not limited to skin and eye pigmentation, liver damage, nausea/vomiting, gastrointestinal bleeding and allergy.
Clindamycin Pregnancy And Lactation Text: This medication can be used in pregnancy if certain situations. Clindamycin is also present in breast milk.
Topical Steroids Applications Pregnancy And Lactation Text: Most topical steroids are considered safe to use during pregnancy and lactation.  Any topical steroid applied to the breast or nipple should be washed off before breastfeeding.
Tremfya Counseling: I discussed with the patient the risks of guselkumab including but not limited to immunosuppression, serious infections, and drug reactions.  The patient understands that monitoring is required including a PPD at baseline and must alert us or the primary physician if symptoms of infection or other concerning signs are noted.
Acitretin Counseling:  I discussed with the patient the risks of acitretin including but not limited to hair loss, dry lips/skin/eyes, liver damage, hyperlipidemia, depression/suicidal ideation, photosensitivity.  Serious rare side effects can include but are not limited to pancreatitis, pseudotumor cerebri, bony changes, clot formation/stroke/heart attack.  Patient understands that alcohol is contraindicated since it can result in liver toxicity and significantly prolong the elimination of the drug by many years.
Nemluvio Counseling: I discussed with the patient the risks of nemolizumab including but not limited to headache, gastrointestinal complaints, nasopharyngitis, musculoskeletal complaints, injection site reactions, and allergic reactions. The patient understands that monitoring is required and they must alert us or the primary physician if any side effects are noted.
Olumiant Pregnancy And Lactation Text: Based on animal studies, Olumiant may cause embryo-fetal harm when administered to pregnant women.  The medication should not be used in pregnancy.  Breastfeeding is not recommended during treatment.
Quinolones Pregnancy And Lactation Text: This medication is Pregnancy Category C and it isn't know if it is safe during pregnancy. It is also excreted in breast milk.
Oral Minoxidil Pregnancy And Lactation Text: This medication should only be used when clearly needed if you are pregnant, attempting to become pregnant or breast feeding.
Latisse Counseling: Lattise Counseling: I reviewed the possible side-effects of Latisse including itching, eye irritation, discoloration and exacerbating glaucoma. I also discussed application methods.
Ivermectin Counseling:  Patient instructed to take medication on an empty stomach with a full glass of water.  Patient informed of potential adverse effects including but not limited to nausea, diarrhea, dizziness, itching, and swelling of the extremities or lymph nodes.  The patient verbalized understanding of the proper use and possible adverse effects of ivermectin.  All of the patient's questions and concerns were addressed.
Cyclosporine Counseling:  I discussed with the patient the risks of cyclosporine including but not limited to hypertension, gingival hyperplasia,myelosuppression, immunosuppression, liver damage, kidney damage, neurotoxicity, lymphoma, and serious infections. The patient understands that monitoring is required including baseline blood pressure, CBC, CMP, lipid panel and uric acid, and then 1-2 times monthly CMP and blood pressure.
Spironolactone Counseling: Patient advised regarding risks of diarrhea, abdominal pain, hyperkalemia, birth defects (for female patients), liver toxicity and renal toxicity. The patient may need blood work to monitor liver and kidney function and potassium levels while on therapy. The patient verbalized understanding of the proper use and possible adverse effects of spironolactone.  All of the patient's questions and concerns were addressed.
Opzelura Pregnancy And Lactation Text: There is insufficient data to evaluate drug-associated risk for major birth defects, miscarriage, or other adverse maternal or fetal outcomes.  There is a pregnancy registry that monitors pregnancy outcomes in pregnant persons exposed to the medication during pregnancy.  It is unknown if this medication is excreted in breast milk.  Do not breastfeed during treatment and for about 4 weeks after the last dose.
Skyrizi Counseling: I discussed with the patient the risks of risankizumab-rzaa including but not limited to immunosuppression, and serious infections.  The patient understands that monitoring is required including a PPD at baseline and must alert us or the primary physician if symptoms of infection or other concerning signs are noted.
Hydroxychloroquine Counseling:  I discussed with the patient that a baseline ophthalmologic exam is needed at the start of therapy and every year thereafter while on therapy. A CBC may also be warranted for monitoring.  The side effects of this medication were discussed with the patient, including but not limited to agranulocytosis, aplastic anemia, seizures, rashes, retinopathy, and liver toxicity. Patient instructed to call the office should any adverse effect occur.  The patient verbalized understanding of the proper use and possible adverse effects of Plaquenil.  All the patient's questions and concerns were addressed.
Tazorac Pregnancy And Lactation Text: This medication is not safe during pregnancy. It is unknown if this medication is excreted in breast milk.
Cimetidine Counseling:  I discussed with the patient the risks of Cimetidine including but not limited to gynecomastia, headache, diarrhea, nausea, drowsiness, arrhythmias, pancreatitis, skin rashes, psychosis, bone marrow suppression and kidney toxicity.
Humira Counseling:  I discussed with the patient the risks of adalimumab including but not limited to myelosuppression, immunosuppression, autoimmune hepatitis, demyelinating diseases, lymphoma, and serious infections.  The patient understands that monitoring is required including a PPD at baseline and must alert us or the primary physician if symptoms of infection or other concerning signs are noted.
Dutasteride Male Counseling: Dustasteride Counseling:  I discussed with the patient the risks of use of dutasteride including but not limited to decreased libido, decreased ejaculate volume, and gynecomastia. Women who can become pregnant should not handle medication.  All of the patient's questions and concerns were addressed.
Calcipotriene Counseling:  I discussed with the patient the risks of calcipotriene including but not limited to erythema, scaling, itching, and irritation.
Azithromycin Counseling:  I discussed with the patient the risks of azithromycin including but not limited to GI upset, allergic reaction, drug rash, diarrhea, and yeast infections.
Latisse Pregnancy And Lactation Text: It is not recommended to use Latisse if you are pregnant or trying to become pregnant.
Griseofulvin Counseling:  I discussed with the patient the risks of griseofulvin including but not limited to photosensitivity, cytopenia, liver damage, nausea/vomiting and severe allergy.  The patient understands that this medication is best absorbed when taken with a fatty meal (e.g., ice cream or french fries).
Dupixent Counseling: I discussed with the patient the risks of dupilumab including but not limited to eye infection and irritation, cold sores, injection site reactions, worsening of asthma, allergic reactions and increased risk of parasitic infection.  Live vaccines should be avoided while taking dupilumab. Dupilumab will also interact with certain medications such as warfarin and cyclosporine. The patient understands that monitoring is required and they must alert us or the primary physician if symptoms of infection or other concerning signs are noted.
Hydroxychloroquine Pregnancy And Lactation Text: This medication has been shown to cause fetal harm but it isn't assigned a Pregnancy Risk Category. There are small amounts excreted in breast milk.
Rifampin Counseling: I discussed with the patient the risks of rifampin including but not limited to liver damage, kidney damage, red-orange body fluids, nausea/vomiting and severe allergy.
Otezla Counseling: The side effects of Otezla were discussed with the patient, including but not limited to worsening or new depression, weight loss, diarrhea, nausea, upper respiratory tract infection, and headache. Patient instructed to call the office should any adverse effect occur.  The patient verbalized understanding of the proper use and possible adverse effects of Otezla.  All the patient's questions and concerns were addressed.
Zyclara Counseling:  I discussed with the patient the risks of imiquimod including but not limited to erythema, scaling, itching, weeping, crusting, and pain.  Patient understands that the inflammatory response to imiquimod is variable from person to person and was educated regarded proper titration schedule.  If flu-like symptoms develop, patient knows to discontinue the medication and contact us.
Spironolactone Pregnancy And Lactation Text: This medication can cause feminization of the male fetus and should be avoided during pregnancy. The active metabolite is also found in breast milk.
Topical Clindamycin Counseling: Patient counseled that this medication may cause skin irritation or allergic reactions.  In the event of skin irritation, the patient was advised to reduce the amount of the drug applied or use it less frequently.   The patient verbalized understanding of the proper use and possible adverse effects of clindamycin.  All of the patient's questions and concerns were addressed.
Picato Counseling:  I discussed with the patient the risks of Picato including but not limited to erythema, scaling, itching, weeping, crusting, and pain.
Methotrexate Counseling:  Patient counseled regarding adverse effects of methotrexate including but not limited to nausea, vomiting, abnormalities in liver function tests. Patients may develop mouth sores, rash, diarrhea, and abnormalities in blood counts. The patient understands that monitoring is required including LFT's and blood counts.  There is a rare possibility of scarring of the liver and lung problems that can occur when taking methotrexate. Persistent nausea, loss of appetite, pale stools, dark urine, cough, and shortness of breath should be reported immediately. Patient advised to discontinue methotrexate treatment at least three months before attempting to become pregnant.  I discussed the need for folate supplements while taking methotrexate.  These supplements can decrease side effects during methotrexate treatment. The patient verbalized understanding of the proper use and possible adverse effects of methotrexate.  All of the patient's questions and concerns were addressed.
Otezla Pregnancy And Lactation Text: This medication is Pregnancy Category C and it isn't known if it is safe during pregnancy. It is unknown if it is excreted in breast milk.
Minoxidil Counseling: Minoxidil is a topical medication which can increase blood flow where it is applied. It is uncertain how this medication increases hair growth. Side effects are uncommon and include stinging and allergic reactions.
Azithromycin Pregnancy And Lactation Text: This medication is considered safe during pregnancy and is also secreted in breast milk.
Calcipotriene Pregnancy And Lactation Text: The use of this medication during pregnancy or lactation is not recommended as there is insufficient data.
Griseofulvin Pregnancy And Lactation Text: This medication is Pregnancy Category X and is known to cause serious birth defects. It is unknown if this medication is excreted in breast milk but breast feeding should be avoided.
Spevigo Counseling: I discussed with the patient the risks of Spevigo including but not limited to fatigue, nasuea, vomiting, headache, pruritus, urinary tract infection, an infusion related reactions.  The patient understands that monitoring is required including screening for tuberculosis at baseline and yearly screening thereafter while continuing Spevigo therapy. They should contact us if symptoms of infection or other concerning signs are noted.
Topical Clindamycin Pregnancy And Lactation Text: This medication is Pregnancy Category B and is considered safe during pregnancy. It is unknown if it is excreted in breast milk.
Low Dose Naltrexone Counseling- I discussed with the patient the potential risks and side effects of low dose naltrexone including but not limited to: more vivid dreams, headaches, nausea, vomiting, abdominal pain, fatigue, dizziness, and anxiety.
Rifampin Pregnancy And Lactation Text: This medication is Pregnancy Category C and it isn't know if it is safe during pregnancy. It is also excreted in breast milk and should not be used if you are breast feeding.
Dutasteride Female Counseling: Dutasteride Counseling:  I discussed with the patient the risks of use of dutasteride including but not limited to decreased libido and sexual dysfunction. Explained the teratogenic nature of the medication and stressed the importance of not getting pregnant during treatment. All of the patient's questions and concerns were addressed.
Doxepin Counseling:  Patient advised that the medication is sedating and not to drive a car after taking this medication. Patient informed of potential adverse effects including but not limited to dry mouth, urinary retention, and blurry vision.  The patient verbalized understanding of the proper use and possible adverse effects of doxepin.  All of the patient's questions and concerns were addressed.
Hyrimoz Counseling:  I discussed with the patient the risks of adalimumab including but not limited to myelosuppression, immunosuppression, autoimmune hepatitis, demyelinating diseases, lymphoma, and serious infections.  The patient understands that monitoring is required including a PPD at baseline and must alert us or the primary physician if symptoms of infection or other concerning signs are noted.
Cantharidin Counseling:  I discussed with the patient the risks of Cantharidin including but not limited to pain, redness, burning, itching, and blistering.
Itraconazole Counseling:  I discussed with the patient the risks of itraconazole including but not limited to liver damage, nausea/vomiting, neuropathy, and severe allergy.  The patient understands that this medication is best absorbed when taken with acidic beverages such as non-diet cola or ginger ale.  The patient understands that monitoring is required including baseline LFTs and repeat LFTs at intervals.  The patient understands that they are to contact us or the primary physician if concerning signs are noted.
Methotrexate Pregnancy And Lactation Text: This medication is Pregnancy Category X and is known to cause fetal harm. This medication is excreted in breast milk.
Bactrim Counseling:  I discussed with the patient the risks of sulfa antibiotics including but not limited to GI upset, allergic reaction, drug rash, diarrhea, dizziness, photosensitivity, and yeast infections.  Rarely, more serious reactions can occur including but not limited to aplastic anemia, agranulocytosis, methemoglobinemia, blood dyscrasias, liver or kidney failure, lung infiltrates or desquamative/blistering drug rashes.
Spevigo Pregnancy And Lactation Text: The risk during pregnancy and breastfeeding is uncertain with this medication. This medication does cross the placenta. It is unknown if this medication is found in breast milk.
Dutasteride Pregnancy And Lactation Text: This medication is absolutely contraindicated in women, especially during pregnancy and breast feeding. Feminization of male fetuses is possible if taking while pregnant.
Oxybutynin Counseling:  I discussed with the patient the risks of oxybutynin including but not limited to skin rash, drowsiness, dry mouth, difficulty urinating, and blurred vision.
Sarecycline Counseling: Patient advised regarding possible photosensitivity and discoloration of the teeth, skin, lips, tongue and gums.  Patient instructed to avoid sunlight, if possible.  When exposed to sunlight, patients should wear protective clothing, sunglasses, and sunscreen.  The patient was instructed to call the office immediately if the following severe adverse effects occur:  hearing changes, easy bruising/bleeding, severe headache, or vision changes.  The patient verbalized understanding of the proper use and possible adverse effects of sarecycline.  All of the patient's questions and concerns were addressed.
Protopic Counseling: Patient may experience a mild burning sensation during topical application. Protopic is not approved in children less than 2 years of age. There have been case reports of hematologic and skin malignancies in patients using topical calcineurin inhibitors although causality is questionable.
Doxepin Pregnancy And Lactation Text: This medication is Pregnancy Category C and it isn't known if it is safe during pregnancy. It is also excreted in breast milk and breast feeding isn't recommended.
Topical Ketoconazole Counseling: Patient counseled that this medication may cause skin irritation or allergic reactions.  In the event of skin irritation, the patient was advised to reduce the amount of the drug applied or use it less frequently.   The patient verbalized understanding of the proper use and possible adverse effects of ketoconazole.  All of the patient's questions and concerns were addressed.
Low Dose Naltrexone Pregnancy And Lactation Text: Naltrexone is pregnancy category C.  There have been no adequate and well-controlled studies in pregnant women.  It should be used in pregnancy only if the potential benefit justifies the potential risk to the fetus.   Limited data indicates that naltrexone is minimally excreted into breastmilk.
5-Fu Counseling: 5-Fluorouracil Counseling:  I discussed with the patient the risks of 5-fluorouracil including but not limited to erythema, scaling, itching, weeping, crusting, and pain.
Erivedge Counseling- I discussed with the patient the risks of Erivedge including but not limited to nausea, vomiting, diarrhea, constipation, weight loss, changes in the sense of taste, decreased appetite, muscle spasms, and hair loss.  The patient verbalized understanding of the proper use and possible adverse effects of Erivedge.  All of the patient's questions and concerns were addressed.
Prednisone Counseling:  I discussed with the patient the risks of prolonged use of prednisone including but not limited to weight gain, insomnia, osteoporosis, mood changes, diabetes, susceptibility to infection, glaucoma and high blood pressure.  In cases where prednisone use is prolonged, patients should be monitored with blood pressure checks, serum glucose levels and an eye exam.  Additionally, the patient may need to be placed on GI prophylaxis, PCP prophylaxis, and calcium and vitamin D supplementation and/or a bisphosphonate.  The patient verbalized understanding of the proper use and the possible adverse effects of prednisone.  All of the patient's questions and concerns were addressed.
Bactrim Pregnancy And Lactation Text: This medication is Pregnancy Category D and is known to cause fetal risk.  It is also excreted in breast milk.
Finasteride Male Counseling: Finasteride Counseling:  I discussed with the patient the risks of use of finasteride including but not limited to decreased libido, decreased ejaculate volume, gynecomastia, and depression. Women should not handle medication.  All of the patient's questions and concerns were addressed.
Cibinqo Counseling: I discussed with the patient the risks of Cibinqo therapy including but not limited to common cold, nausea, headache, cold sores, increased blood CPK levels, dizziness, UTIs, fatigue, acne, and vomitting. Live vaccines should be avoided.  This medication has been linked to serious infections; higher rate of mortality; malignancy and lymphoproliferative disorders; major adverse cardiovascular events; thrombosis; thrombocytopenia and lymphopenia; lipid elevations; and retinal detachment.
Protopic Pregnancy And Lactation Text: This medication is Pregnancy Category C. It is unknown if this medication is excreted in breast milk when applied topically.
Niacinamide Counseling: I recommended taking niacin or niacinamide, also know as vitamin B3, twice daily. Recent evidence suggests that taking vitamin B3 (500 mg twice daily) can reduce the risk of actinic keratoses and non-melanoma skin cancers. Side effects of vitamin B3 include flushing and headache.
Stelara Counseling:  I discussed with the patient the risks of ustekinumab including but not limited to immunosuppression, malignancy, posterior leukoencephalopathy syndrome, and serious infections.  The patient understands that monitoring is required including a PPD at baseline and must alert us or the primary physician if symptoms of infection or other concerning signs are noted.

## 2025-06-11 RX ORDER — TRETIONIN 0.25 MG/G
1 CREAM TOPICAL QD
Qty: 45 | Refills: 6 | Status: ERX

## 2025-06-12 RX ORDER — TRETIONIN 0.25 MG/G
1 CREAM TOPICAL QD
Qty: 45 | Refills: 6 | Status: ERX

## 2025-07-01 DIAGNOSIS — I10 ESSENTIAL HYPERTENSION: ICD-10-CM

## 2025-07-02 RX ORDER — CARVEDILOL 12.5 MG/1
12.5 TABLET ORAL 2 TIMES DAILY WITH MEALS
Qty: 180 TABLET | Refills: 2 | OUTPATIENT
Start: 2025-07-02

## 2025-08-18 DIAGNOSIS — I24.0 ISCHEMIC HEART DISEASE DUE TO CORONARY ARTERY OBSTRUCTION (HCC): ICD-10-CM

## 2025-08-18 DIAGNOSIS — I25.9 ISCHEMIC HEART DISEASE DUE TO CORONARY ARTERY OBSTRUCTION (HCC): ICD-10-CM

## (undated) DEVICE — TUBE CONNECTING SUCTION - CLEAR PLASTIC STERILE 72 IN (50EA/CA)

## (undated) DEVICE — TIP INTPLS HFLO ML ORFC BTRY - (12/CS)  FOR SURGILAV

## (undated) DEVICE — PACK MAJOR ORTHO - (2EA/CA)

## (undated) DEVICE — SENSOR OXIMETER ADULT SPO2 RD SET (20EA/BX)

## (undated) DEVICE — CANISTER SUCTION RIGID RED 1500CC (40EA/CA)

## (undated) DEVICE — GOWN SURGICAL X-LARGE ULTRA - FILM-REINFORCED (20/CA)

## (undated) DEVICE — NEEDLE MAYO CATGUT TPR POINT - (2EA/PK20PK/BX)

## (undated) DEVICE — GLOVE BIOGEL SZ 7 SURGICAL PF LTX - (50PR/BX 4BX/CA)

## (undated) DEVICE — PACK SHOULDER ARTHROSCOPY SM - (2EA/CA)

## (undated) DEVICE — TUBING PUMP WITH CONNECTOR REDEUCE (1EA)

## (undated) DEVICE — GOWN SURGEONS X-LARGE - DISP. (30/CA)

## (undated) DEVICE — HANDPIECE 10FT INTPLS SCT PLS IRRIGATION HAND CONTROL SET (6/PK)

## (undated) DEVICE — LACTATED RINGERS INJ 1000 ML - (14EA/CA 60CA/PF)

## (undated) DEVICE — TAPE XBRAID TT 1.2MM (12EA/BX)

## (undated) DEVICE — SODIUM CHL IRRIGATION 0.9% 1000ML (12EA/CA)

## (undated) DEVICE — SYRINGE ASEPTO - (50EA/CA

## (undated) DEVICE — ABLATOR WAND SERFAS 90-S CRUISE

## (undated) DEVICE — SUTURE 0 VICRYL PLUS CT-1 - 8 X 18 INCH (12/BX)

## (undated) DEVICE — SENSOR SPO2 NEO LNCS ADHESIVE (20/BX) SEE USER NOTES

## (undated) DEVICE — TOWEL STOP TIMEOUT SAFETY FLAG (40EA/CA)

## (undated) DEVICE — TUBING PATIENT W/CONNECTOR REDEUCE (1EA)

## (undated) DEVICE — SUTURE 3-0 ETHILON FS-1 - (36/BX) 30 INCH

## (undated) DEVICE — SHAVER, 5.5 RESECTOR

## (undated) DEVICE — PAD PREP 24 X 48 CUFFED - (100/CA)

## (undated) DEVICE — DRILL BIT

## (undated) DEVICE — HUMID-VENT HEAT AND MOISTURE EXCHANGE- (50/BX)

## (undated) DEVICE — PROTECTOR ULNA NERVE - (36PR/CA)

## (undated) DEVICE — BLADE 90X18X1.27MM SAW SAGITTAL

## (undated) DEVICE — DRAPETIBURON SHOULDER W/POUCH - (5EA/CA)

## (undated) DEVICE — GLOVE, LITE (PAIR)

## (undated) DEVICE — SUCTION INSTRUMENT YANKAUER BULBOUS TIP W/O VENT (50EA/CA)

## (undated) DEVICE — SHAVER4.0 AGGRESSIVE + FORMLA (5EA/BX)

## (undated) DEVICE — CLOSURE SKIN STRIP 1/2 X 4 IN - (STERI STRIP) (50/BX 4BX/CA)

## (undated) DEVICE — GUIDEWIRE

## (undated) DEVICE — SODIUM CHL. IRRIGATION 0.9% 3000ML (4EA/CA 65CA/PF)

## (undated) DEVICE — DRAPE U ORTHOPEDIC - (10/BX)

## (undated) DEVICE — SLEEVE SHOULDER DISP(ARTHREX) - (6/BX)

## (undated) DEVICE — SUTURE 2-0 VICRYL PLUS CT-2 - 27 INCH (36/BX)

## (undated) DEVICE — DRAPE SHOULDER FLUID CONTROL - 77 X 85 (10/CA)

## (undated) DEVICE — CANNULA THREADED 5X75 (5EA/BX)

## (undated) DEVICE — SUTURE GENERAL

## (undated) DEVICE — GLOVE BIOGEL INDICATOR SZ 8 SURGICAL PF LTX - (50/BX 4BX/CA)

## (undated) DEVICE — WATER IRRIGATION STERILE 1000ML (12EA/CA)

## (undated) DEVICE — SUTURE 2-0 MONOCRYL PLUS UNDYED CT-1 1 X 36 (36EA/BX)"

## (undated) DEVICE — TUBING CLEARLINK DUO-VENT - C-FLO (48EA/CA)

## (undated) DEVICE — MASK ANESTHESIA ADULT  - (100/CA)

## (undated) DEVICE — DRAPE LARGE 3 QUARTER - (20/CA)

## (undated) DEVICE — CANNULA FULLY THREADED 8 X 75 (5EA/BX)

## (undated) DEVICE — ELECTRODE 850 FOAM ADHESIVE - HYDROGEL RADIOTRNSPRNT (50/PK)

## (undated) DEVICE — ELECTRODE DUAL RETURN W/ CORD - (50/PK)

## (undated) DEVICE — STOCKINETTE, TUBULAR 6

## (undated) DEVICE — PIN GUIDE

## (undated) DEVICE — SET EXTENSION WITH 2 PORTS (48EA/CA) ***PART #2C8610 IS A SUBSTITUTE*****

## (undated) DEVICE — AQUACEL 4X4

## (undated) DEVICE — GLOVE SZ 7.5 LF PROTEXIS (50PR/BX)

## (undated) DEVICE — CHLORAPREP 26 ML APPLICATOR - ORANGE TINT(25/CA)

## (undated) DEVICE — GLOVE 7.0 LF PF PROTEXIS (50PR/BX)

## (undated) DEVICE — PACK TOTAL HIP - (1/CA)

## (undated) DEVICE — SUTURE FORCEFIBER W/TAPER NEEDLE (12EA/BX)

## (undated) DEVICE — HEAD HOLDER JUNIOR/ADULT

## (undated) DEVICE — KIT ANESTHESIA W/CIRCUIT & 3/LT BAG W/FILTER (20EA/CA)

## (undated) DEVICE — BAG SPONGE COUNT 10.25 X 32 - BLUE (250/CA)

## (undated) DEVICE — TAPE XBRAID TT 2.0MM (12EA/BX)

## (undated) DEVICE — GOWN WARMING STANDARD FLEX - (30/CA)

## (undated) DEVICE — SUTURE 3-0 MONOCRYL PLUS PS-2 - (12/BX)

## (undated) DEVICE — MASK, LARYNGEAL AIRWAY #4

## (undated) DEVICE — NEEDLE W/FACET TIP DULL VERSION W/STIMULATION CABLE SONOPLEX 21G X 4 (10/EA)"

## (undated) DEVICE — NEPTUNE 4 PORT MANIFOLD - (20/PK)

## (undated) DEVICE — SPIDER SHOULDER HOLDER (12EA/BX)

## (undated) DEVICE — DEVICE MONOPOLAR RF PEAK PLASMABLADE 3.0S

## (undated) DEVICE — DRAPE SURG STERI-DRAPE 7X11OD - (40EA/CA)

## (undated) DEVICE — STERI STRIP COMPOUND BENZOIN - TINCTURE 0.6ML WITH APPLICATOR (40EA/BX)

## (undated) DEVICE — DEVICE SUTURING PASSER SLINGSHOT LEFT 45 DEGREE

## (undated) DEVICE — DRAPE IOBAN II INCISE 23X17 - (10EA/BX 4BX/CA)

## (undated) DEVICE — DRAPE U SPLIT IMP 54 X 76 - (24/CA)

## (undated) DEVICE — SOLUTION PREP PVP IODINE 3/4 OZ POUCH PACKET CONTAINER STERILE LATEX FREE